# Patient Record
Sex: MALE | Race: WHITE | NOT HISPANIC OR LATINO | Employment: FULL TIME | URBAN - METROPOLITAN AREA
[De-identification: names, ages, dates, MRNs, and addresses within clinical notes are randomized per-mention and may not be internally consistent; named-entity substitution may affect disease eponyms.]

---

## 2017-07-06 ENCOUNTER — NURSE TRIAGE (OUTPATIENT)
Dept: ADMINISTRATIVE | Facility: CLINIC | Age: 42
End: 2017-07-06

## 2017-07-06 NOTE — TELEPHONE ENCOUNTER
Reason for Disposition   MODERATE difficulty breathing (e.g., speaks in phrases, SOB even at rest, pulse 100-120) of new onset or worse than normal    Protocols used: ST BREATHING DIFFICULTY-A-[place]

## 2017-09-11 ENCOUNTER — OFFICE VISIT (OUTPATIENT)
Dept: FAMILY MEDICINE | Facility: CLINIC | Age: 42
End: 2017-09-11
Payer: OTHER GOVERNMENT

## 2017-09-11 ENCOUNTER — LAB VISIT (OUTPATIENT)
Dept: LAB | Facility: HOSPITAL | Age: 42
End: 2017-09-11
Attending: PSYCHIATRY & NEUROLOGY
Payer: OTHER GOVERNMENT

## 2017-09-11 VITALS
BODY MASS INDEX: 29.26 KG/M2 | TEMPERATURE: 98 F | DIASTOLIC BLOOD PRESSURE: 64 MMHG | WEIGHT: 209 LBS | SYSTOLIC BLOOD PRESSURE: 104 MMHG | HEART RATE: 73 BPM | OXYGEN SATURATION: 97 % | RESPIRATION RATE: 18 BRPM | HEIGHT: 71 IN

## 2017-09-11 DIAGNOSIS — R07.89 CHEST TIGHTNESS: ICD-10-CM

## 2017-09-11 DIAGNOSIS — G47.00 INSOMNIA, UNSPECIFIED TYPE: ICD-10-CM

## 2017-09-11 DIAGNOSIS — F41.9 ANXIETY: Primary | ICD-10-CM

## 2017-09-11 LAB
BASOPHILS # BLD AUTO: 0.03 K/UL
BASOPHILS NFR BLD: 0.6 %
DIFFERENTIAL METHOD: NORMAL
EOSINOPHIL # BLD AUTO: 0.1 K/UL
EOSINOPHIL NFR BLD: 2.4 %
ERYTHROCYTE [DISTWIDTH] IN BLOOD BY AUTOMATED COUNT: 11.8 %
HCT VFR BLD AUTO: 43.4 %
HGB BLD-MCNC: 15 G/DL
LYMPHOCYTES # BLD AUTO: 1.3 K/UL
LYMPHOCYTES NFR BLD: 26.7 %
MCH RBC QN AUTO: 29.8 PG
MCHC RBC AUTO-ENTMCNC: 34.6 G/DL
MCV RBC AUTO: 86 FL
MONOCYTES # BLD AUTO: 0.3 K/UL
MONOCYTES NFR BLD: 6.4 %
NEUTROPHILS # BLD AUTO: 3.2 K/UL
NEUTROPHILS NFR BLD: 63.5 %
PLATELET # BLD AUTO: 194 K/UL
PMV BLD AUTO: 10.5 FL
RBC # BLD AUTO: 5.03 M/UL
WBC # BLD AUTO: 5.02 K/UL

## 2017-09-11 PROCEDURE — 36415 COLL VENOUS BLD VENIPUNCTURE: CPT | Mod: PO

## 2017-09-11 PROCEDURE — 99213 OFFICE O/P EST LOW 20 MIN: CPT | Mod: PBBFAC,PO | Performed by: NURSE PRACTITIONER

## 2017-09-11 PROCEDURE — 84443 ASSAY THYROID STIM HORMONE: CPT

## 2017-09-11 PROCEDURE — 93010 ELECTROCARDIOGRAM REPORT: CPT | Mod: ,,, | Performed by: INTERNAL MEDICINE

## 2017-09-11 PROCEDURE — 80053 COMPREHEN METABOLIC PANEL: CPT

## 2017-09-11 PROCEDURE — 99999 PR PBB SHADOW E&M-EST. PATIENT-LVL III: CPT | Mod: PBBFAC,,, | Performed by: NURSE PRACTITIONER

## 2017-09-11 PROCEDURE — 85025 COMPLETE CBC W/AUTO DIFF WBC: CPT

## 2017-09-11 PROCEDURE — 3008F BODY MASS INDEX DOCD: CPT | Mod: ,,, | Performed by: NURSE PRACTITIONER

## 2017-09-11 PROCEDURE — 93005 ELECTROCARDIOGRAM TRACING: CPT | Mod: PBBFAC,PO | Performed by: NURSE PRACTITIONER

## 2017-09-11 PROCEDURE — 80061 LIPID PANEL: CPT

## 2017-09-11 PROCEDURE — 99214 OFFICE O/P EST MOD 30 MIN: CPT | Mod: S$PBB,,, | Performed by: NURSE PRACTITIONER

## 2017-09-11 RX ORDER — HYDROXYZINE HYDROCHLORIDE 25 MG/1
25 TABLET, FILM COATED ORAL
COMMUNITY
End: 2017-10-05

## 2017-09-11 NOTE — PROGRESS NOTES
Subjective:       Patient ID: Hi Salmeron is a 42 y.o. male.    Chief Complaint: Chest Pain and Cough    HPI   Mr. Salmeron is a new patient to me. He reports 2 months ago he started having chest tightness/pains, was advised to go to ED but went to urgent care, workup unremarkable. He can relate time of onset to time of increased stress--young son with heart problems requiring a 2nd surgery, new stressful job, possible deployment at the end of this year. He is anxious while talking to me.  Vitals:    09/11/17 1550   BP: 104/64   Pulse: 73   Resp: 18   Temp: 98.3 °F (36.8 °C)     Review of Systems   Constitutional: Negative.  Negative for chills, diaphoresis, fatigue and fever.   HENT: Negative.  Negative for facial swelling and trouble swallowing.    Eyes: Negative.  Negative for discharge and redness.   Respiratory: Negative.  Negative for cough and shortness of breath.    Cardiovascular: Positive for chest pain. Negative for palpitations.   Gastrointestinal: Negative.  Negative for abdominal pain, constipation and diarrhea.   Genitourinary: Negative.  Negative for difficulty urinating and flank pain.   Musculoskeletal: Negative.  Negative for back pain and neck pain.   Skin: Negative.  Negative for rash and wound.   Neurological: Negative.  Negative for dizziness and light-headedness.   Hematological: Negative.    Psychiatric/Behavioral: Positive for sleep disturbance. Negative for confusion. The patient is nervous/anxious.        No past medical history on file.  Objective:      Physical Exam   Constitutional: He is oriented to person, place, and time. He appears well-developed and well-nourished.  Non-toxic appearance. He does not have a sickly appearance. He does not appear ill. No distress.   HENT:   Head: Normocephalic and atraumatic.   Right Ear: Hearing and external ear normal.   Left Ear: Hearing and external ear normal.   Nose: Nose normal.   Eyes: Conjunctivae, EOM and lids are normal. Right eye exhibits no  discharge. Left eye exhibits no discharge.   Neck: Trachea normal. No JVD present.   Cardiovascular: Normal rate, regular rhythm, S1 normal, S2 normal and normal heart sounds.   No extrasystoles are present. Exam reveals no gallop, no S3, no S4, no distant heart sounds and no friction rub.    No murmur heard.  Pulmonary/Chest: Effort normal and breath sounds normal. No accessory muscle usage. No respiratory distress. He has no decreased breath sounds. He has no wheezes. He has no rhonchi. He has no rales. He exhibits no tenderness.   Abdominal: Soft. Normal appearance and bowel sounds are normal.   Musculoskeletal: Normal range of motion. He exhibits no edema or deformity.   Neurological: He is alert and oriented to person, place, and time.   Skin: Skin is warm, dry and intact. He is not diaphoretic. No pallor.   Psychiatric: His speech is normal and behavior is normal. Judgment and thought content normal. His mood appears anxious. Cognition and memory are normal.   Nursing note and vitals reviewed.      Assessment:       1. Chest tightness    2. Anxiety    3. Insomnia, unspecified type        Plan:       Chest tightness  -     EKG 12-lead; Future---in office NSR  -     Cardiac treadmill stress test; Future  -     CBC auto differential; Future; Expected date: 09/11/2017  -     Comprehensive metabolic panel; Future; Expected date: 09/11/2017  -     Lipid panel; Future; Expected date: 09/11/2017  -     TSH; Future; Expected date: 09/11/2017    Anxiety  Insomnia, unspecified type   Discussed chest tightness likely related to anxiety. Discussed anxiety, role of counseling and medications. Patient would like to wait until workup is completed prior initiation of medication.         Labs today, will call to discuss results  Return if symptoms worsen or fail to improve.

## 2017-09-12 ENCOUNTER — TELEPHONE (OUTPATIENT)
Dept: FAMILY MEDICINE | Facility: CLINIC | Age: 42
End: 2017-09-12

## 2017-09-12 LAB
ALBUMIN SERPL BCP-MCNC: 4.3 G/DL
ALP SERPL-CCNC: 71 U/L
ALT SERPL W/O P-5'-P-CCNC: 31 U/L
ANION GAP SERPL CALC-SCNC: 12 MMOL/L
AST SERPL-CCNC: 26 U/L
BILIRUB SERPL-MCNC: 0.9 MG/DL
BUN SERPL-MCNC: 19 MG/DL
CALCIUM SERPL-MCNC: 9.7 MG/DL
CHLORIDE SERPL-SCNC: 106 MMOL/L
CHOLEST SERPL-MCNC: 202 MG/DL
CHOLEST/HDLC SERPL: 3.5 {RATIO}
CO2 SERPL-SCNC: 24 MMOL/L
CREAT SERPL-MCNC: 1 MG/DL
EST. GFR  (AFRICAN AMERICAN): >60 ML/MIN/1.73 M^2
EST. GFR  (NON AFRICAN AMERICAN): >60 ML/MIN/1.73 M^2
GLUCOSE SERPL-MCNC: 81 MG/DL
HDLC SERPL-MCNC: 57 MG/DL
HDLC SERPL: 28.2 %
LDLC SERPL CALC-MCNC: 132.8 MG/DL
NONHDLC SERPL-MCNC: 145 MG/DL
POTASSIUM SERPL-SCNC: 4.8 MMOL/L
PROT SERPL-MCNC: 7.4 G/DL
SODIUM SERPL-SCNC: 142 MMOL/L
TRIGL SERPL-MCNC: 61 MG/DL
TSH SERPL DL<=0.005 MIU/L-ACNC: 2.06 UIU/ML

## 2017-09-12 NOTE — TELEPHONE ENCOUNTER
Spoke with patient, updated on labs. Advised low fat/low cholesterol diet with regular exercise. Will call with stress test results once completed

## 2017-09-14 ENCOUNTER — CLINICAL SUPPORT (OUTPATIENT)
Dept: CARDIOLOGY | Facility: CLINIC | Age: 42
End: 2017-09-14
Payer: OTHER GOVERNMENT

## 2017-09-14 DIAGNOSIS — R07.89 CHEST TIGHTNESS: ICD-10-CM

## 2017-09-14 LAB — DIASTOLIC DYSFUNCTION: NO

## 2017-09-14 PROCEDURE — 93018 CV STRESS TEST I&R ONLY: CPT | Mod: S$PBB,,, | Performed by: INTERNAL MEDICINE

## 2017-09-14 PROCEDURE — 93016 CV STRESS TEST SUPVJ ONLY: CPT | Mod: S$PBB,,, | Performed by: INTERNAL MEDICINE

## 2017-09-14 PROCEDURE — 93017 CV STRESS TEST TRACING ONLY: CPT | Mod: PBBFAC,PO | Performed by: INTERNAL MEDICINE

## 2017-09-15 ENCOUNTER — TELEPHONE (OUTPATIENT)
Dept: FAMILY MEDICINE | Facility: CLINIC | Age: 42
End: 2017-09-15

## 2017-09-15 DIAGNOSIS — F41.1 GENERALIZED ANXIETY DISORDER: ICD-10-CM

## 2017-09-15 DIAGNOSIS — F41.1 GENERALIZED ANXIETY DISORDER: Primary | ICD-10-CM

## 2017-09-15 RX ORDER — ESCITALOPRAM OXALATE 10 MG/1
TABLET ORAL
Qty: 30 TABLET | Refills: 0 | Status: SHIPPED | OUTPATIENT
Start: 2017-09-15 | End: 2017-10-05 | Stop reason: SDUPTHER

## 2017-09-15 RX ORDER — ESCITALOPRAM OXALATE 10 MG/1
10 TABLET ORAL DAILY
Qty: 30 TABLET | Refills: 0 | Status: SHIPPED | OUTPATIENT
Start: 2017-09-15 | End: 2017-09-15 | Stop reason: SDUPTHER

## 2017-09-15 NOTE — TELEPHONE ENCOUNTER
Spoke with patient, discussed medications, start lexapro--advised to start counseling.     Please call and schedule him for 2-3 week follow up with me--40min appointment

## 2017-09-15 NOTE — TELEPHONE ENCOUNTER
Spoke with patient, updated on stress test. Advised patient I believe his symptoms are related to anxiety, advised medication and counseling. Patient will think about it and call me back to discuss.

## 2017-10-05 ENCOUNTER — OFFICE VISIT (OUTPATIENT)
Dept: FAMILY MEDICINE | Facility: CLINIC | Age: 42
End: 2017-10-05
Payer: OTHER GOVERNMENT

## 2017-10-05 ENCOUNTER — TELEPHONE (OUTPATIENT)
Dept: FAMILY MEDICINE | Facility: CLINIC | Age: 42
End: 2017-10-05

## 2017-10-05 ENCOUNTER — LAB VISIT (OUTPATIENT)
Dept: LAB | Facility: HOSPITAL | Age: 42
End: 2017-10-05
Attending: NURSE PRACTITIONER
Payer: OTHER GOVERNMENT

## 2017-10-05 VITALS
OXYGEN SATURATION: 97 % | BODY MASS INDEX: 28.05 KG/M2 | HEIGHT: 71 IN | RESPIRATION RATE: 16 BRPM | HEART RATE: 96 BPM | SYSTOLIC BLOOD PRESSURE: 102 MMHG | WEIGHT: 200.38 LBS | TEMPERATURE: 98 F | DIASTOLIC BLOOD PRESSURE: 60 MMHG

## 2017-10-05 DIAGNOSIS — F41.1 GENERALIZED ANXIETY DISORDER: ICD-10-CM

## 2017-10-05 PROBLEM — F41.9 ANXIETY: Status: ACTIVE | Noted: 2017-10-05

## 2017-10-05 LAB
ALBUMIN SERPL BCP-MCNC: 4.2 G/DL
ALP SERPL-CCNC: 72 U/L
ALT SERPL W/O P-5'-P-CCNC: 29 U/L
ANION GAP SERPL CALC-SCNC: 7 MMOL/L
AST SERPL-CCNC: 20 U/L
BILIRUB SERPL-MCNC: 0.8 MG/DL
BUN SERPL-MCNC: 24 MG/DL
CALCIUM SERPL-MCNC: 10 MG/DL
CHLORIDE SERPL-SCNC: 107 MMOL/L
CO2 SERPL-SCNC: 29 MMOL/L
CREAT SERPL-MCNC: 1.1 MG/DL
EST. GFR  (AFRICAN AMERICAN): >60 ML/MIN/1.73 M^2
EST. GFR  (NON AFRICAN AMERICAN): >60 ML/MIN/1.73 M^2
GLUCOSE SERPL-MCNC: 68 MG/DL
POTASSIUM SERPL-SCNC: 5 MMOL/L
PROT SERPL-MCNC: 7.3 G/DL
SODIUM SERPL-SCNC: 143 MMOL/L

## 2017-10-05 PROCEDURE — 36415 COLL VENOUS BLD VENIPUNCTURE: CPT | Mod: PO

## 2017-10-05 PROCEDURE — 80053 COMPREHEN METABOLIC PANEL: CPT

## 2017-10-05 PROCEDURE — 99999 PR PBB SHADOW E&M-EST. PATIENT-LVL III: CPT | Mod: PBBFAC,,, | Performed by: NURSE PRACTITIONER

## 2017-10-05 PROCEDURE — 99213 OFFICE O/P EST LOW 20 MIN: CPT | Mod: PBBFAC,PO | Performed by: NURSE PRACTITIONER

## 2017-10-05 PROCEDURE — 99213 OFFICE O/P EST LOW 20 MIN: CPT | Mod: S$PBB,,, | Performed by: NURSE PRACTITIONER

## 2017-10-05 RX ORDER — ESCITALOPRAM OXALATE 10 MG/1
10 TABLET ORAL DAILY
Qty: 90 TABLET | Refills: 1 | Status: SHIPPED | OUTPATIENT
Start: 2017-10-05 | End: 2018-01-30 | Stop reason: SDUPTHER

## 2017-10-05 NOTE — PROGRESS NOTES
Subjective:       Patient ID: Hi Salmeron is a 42 y.o. male.    Chief Complaint: Anxiety    HPI   Mr. Salmeron is a known patient to me. He presents today for anxiety follow up. He is currently on lexapro 10mg daily. He reports he is feeling much better. Continues to have a lot of stress about young son's medical conditions but is able to cope better and not become overwhelmed.   Vitals:    10/05/17 0930   BP: 102/60   Pulse: 96   Resp: 16   Temp: 98.2 °F (36.8 °C)     Review of Systems   Constitutional: Negative.  Negative for diaphoresis and fever.   HENT: Negative.  Negative for facial swelling and trouble swallowing.    Eyes: Negative.  Negative for discharge and redness.   Respiratory: Negative.  Negative for cough and shortness of breath.    Cardiovascular: Negative.  Negative for chest pain and palpitations.   Gastrointestinal: Negative.  Negative for constipation and vomiting.   Skin: Negative.  Negative for rash and wound.   Neurological: Negative.  Negative for tremors and speech difficulty.   Hematological: Negative.    Psychiatric/Behavioral: Negative.  Negative for confusion. The patient is not nervous/anxious.        History reviewed. No pertinent past medical history.  Objective:      Physical Exam   Constitutional: He is oriented to person, place, and time. He appears well-developed.  Non-toxic appearance. No distress.   HENT:   Head: Normocephalic and atraumatic.   Right Ear: Hearing normal.   Left Ear: Hearing normal.   Nose: Nose normal.   Eyes: Conjunctivae, EOM and lids are normal. Right eye exhibits no discharge. Left eye exhibits no discharge.   Neck: Trachea normal. No JVD present.   Cardiovascular: Normal rate, regular rhythm, S1 normal, S2 normal and normal heart sounds.   No extrasystoles are present. Exam reveals no gallop, no S3, no S4, no distant heart sounds and no friction rub.    No murmur heard.  Pulmonary/Chest: Effort normal and breath sounds normal. No accessory muscle usage. No  respiratory distress. He has no decreased breath sounds. He has no wheezes. He has no rhonchi. He has no rales. He exhibits no tenderness.   Abdominal: Normal appearance. He exhibits no distension.   Musculoskeletal: Normal range of motion. He exhibits no edema or deformity.   Neurological: He is alert and oriented to person, place, and time.   Skin: Skin is warm, dry and intact. He is not diaphoretic. No pallor.   Psychiatric: He has a normal mood and affect. His speech is normal and behavior is normal. Judgment and thought content normal. Cognition and memory are normal.   Nursing note and vitals reviewed.      Assessment:       1. Generalized anxiety disorder        Plan:       Generalized anxiety disorder  -     escitalopram oxalate (LEXAPRO) 10 MG tablet; Take 1 tablet (10 mg total) by mouth once daily.  Dispense: 90 tablet; Refill: 1  -     Comprehensive metabolic panel; Future; Expected date: 10/05/2017  - Advised patient he may try increasing lexapro to 20mg daily. Advised to monitor for side effects.     Follow up with any questions or concerns        Return if symptoms worsen or fail to improve.

## 2017-10-05 NOTE — TELEPHONE ENCOUNTER
----- Message from Bertrand Gant sent at 10/5/2017  2:03 PM CDT -----  Contact: self  [phone]  Patient called asking for recent prescribed rx to be faxed to [facility] on [address]. Rx was faxed to the wrong pharmacy. Thanks!

## 2017-10-06 ENCOUNTER — PATIENT MESSAGE (OUTPATIENT)
Dept: FAMILY MEDICINE | Facility: CLINIC | Age: 42
End: 2017-10-06

## 2018-01-30 DIAGNOSIS — F41.1 GENERALIZED ANXIETY DISORDER: ICD-10-CM

## 2018-01-30 RX ORDER — ESCITALOPRAM OXALATE 10 MG/1
TABLET ORAL
Qty: 30 TABLET | Refills: 2 | Status: SHIPPED | OUTPATIENT
Start: 2018-01-30 | End: 2018-02-20 | Stop reason: SDUPTHER

## 2018-01-30 NOTE — TELEPHONE ENCOUNTER
Please call, he needs to be seen every 6 months while on lexapro (which would be in April) schedule to establish with [facility] prior

## 2018-02-01 ENCOUNTER — LAB VISIT (OUTPATIENT)
Dept: LAB | Facility: HOSPITAL | Age: 43
End: 2018-02-01
Attending: NURSE PRACTITIONER
Payer: OTHER GOVERNMENT

## 2018-02-01 DIAGNOSIS — F41.1 GENERALIZED ANXIETY DISORDER: ICD-10-CM

## 2018-02-01 LAB
ALBUMIN SERPL BCP-MCNC: 4 G/DL
ALP SERPL-CCNC: 80 U/L
ALT SERPL W/O P-5'-P-CCNC: 32 U/L
ANION GAP SERPL CALC-SCNC: 10 MMOL/L
AST SERPL-CCNC: 20 U/L
BILIRUB SERPL-MCNC: 0.7 MG/DL
BUN SERPL-MCNC: 24 MG/DL
CALCIUM SERPL-MCNC: 9.2 MG/DL
CHLORIDE SERPL-SCNC: 104 MMOL/L
CO2 SERPL-SCNC: 27 MMOL/L
CREAT SERPL-MCNC: 0.9 MG/DL
EST. GFR  (AFRICAN AMERICAN): >60 ML/MIN/1.73 M^2
EST. GFR  (NON AFRICAN AMERICAN): >60 ML/MIN/1.73 M^2
GLUCOSE SERPL-MCNC: 92 MG/DL
POTASSIUM SERPL-SCNC: 4.1 MMOL/L
PROT SERPL-MCNC: 7.3 G/DL
SODIUM SERPL-SCNC: 141 MMOL/L

## 2018-02-01 PROCEDURE — 36415 COLL VENOUS BLD VENIPUNCTURE: CPT | Mod: PO

## 2018-02-01 PROCEDURE — 80053 COMPREHEN METABOLIC PANEL: CPT

## 2018-02-05 ENCOUNTER — TELEPHONE (OUTPATIENT)
Dept: FAMILY MEDICINE | Facility: CLINIC | Age: 43
End: 2018-02-05

## 2018-02-06 ENCOUNTER — OFFICE VISIT (OUTPATIENT)
Dept: FAMILY MEDICINE | Facility: CLINIC | Age: 43
End: 2018-02-06
Payer: OTHER GOVERNMENT

## 2018-02-06 VITALS
SYSTOLIC BLOOD PRESSURE: 108 MMHG | WEIGHT: 203.69 LBS | RESPIRATION RATE: 18 BRPM | HEART RATE: 78 BPM | OXYGEN SATURATION: 96 % | DIASTOLIC BLOOD PRESSURE: 76 MMHG | HEIGHT: 71 IN | BODY MASS INDEX: 28.52 KG/M2

## 2018-02-06 DIAGNOSIS — Z00.00 ROUTINE PHYSICAL EXAMINATION: Primary | ICD-10-CM

## 2018-02-06 PROCEDURE — 99213 OFFICE O/P EST LOW 20 MIN: CPT | Mod: PBBFAC,PO | Performed by: INTERNAL MEDICINE

## 2018-02-06 PROCEDURE — 99396 PREV VISIT EST AGE 40-64: CPT | Mod: S$PBB,,, | Performed by: INTERNAL MEDICINE

## 2018-02-06 PROCEDURE — 99999 PR PBB SHADOW E&M-EST. PATIENT-LVL III: CPT | Mod: PBBFAC,,, | Performed by: INTERNAL MEDICINE

## 2018-02-06 RX ORDER — MULTIVITAMIN
1 TABLET ORAL DAILY
COMMUNITY

## 2018-02-06 NOTE — PROGRESS NOTES
Subjective:       Patient ID: Hi Salmeron is a 42 y.o. male.    Chief Complaint: Annual Exam    Here for routine health maintenance.  No new complaints.  Situation anxiety - on Lexapro for 3 mo.  Would like to wean off.  Took bc 1yo had heart surgery and ok now.       Review of Systems   Constitutional: Negative for appetite change and fever.   HENT: Negative for nosebleeds and trouble swallowing.    Eyes: Negative for discharge and visual disturbance.   Respiratory: Negative for choking and shortness of breath.    Cardiovascular: Negative for chest pain and palpitations.   Gastrointestinal: Negative for abdominal pain, nausea and vomiting.   Musculoskeletal: Negative for arthralgias and joint swelling.   Skin: Negative for rash and wound.   Neurological: Negative for dizziness and syncope.   Psychiatric/Behavioral: Negative for confusion and dysphoric mood.       Objective:      Vitals:    02/06/18 0750   BP: 108/76   Pulse: 78   Resp: 18     Physical Exam   Constitutional: He appears well-nourished.   Eyes: Conjunctivae and EOM are normal.   Neck: Trachea normal and normal range of motion. No thyromegaly present.   Cardiovascular: Normal heart sounds.    Edema negative   Pulmonary/Chest: Effort normal and breath sounds normal.   Abdominal: Soft. There is no hepatomegaly.   Musculoskeletal:   ROM normal bilateral  Strength normal bilateral   Neurological: He has normal reflexes. No cranial nerve deficit.   Skin: Skin is warm, dry and intact.   Psychiatric: He has a normal mood and affect.   Alert and Oriented    Vitals reviewed.        Assessment:       1. Routine physical examination        Plan:       Routine physical examination    ok to wean - gave instructions.   Wellness reviewed        Counseled on regular exercise, maintenance of a healthy weight, balanced diet rich in fruits/vegetables and lean protein, and avoidance of unhealthy habits like smoking and excessive alcohol intake.   Also, counseled on  "importance of being compliant with medication, health appointments, diet and exercise.     Follow-up if symptoms worsen or fail to improve.    "This note will not be shared with the patient."  "

## 2018-02-20 DIAGNOSIS — F41.1 GENERALIZED ANXIETY DISORDER: ICD-10-CM

## 2018-02-22 RX ORDER — ESCITALOPRAM OXALATE 10 MG/1
10 TABLET ORAL DAILY
Qty: 30 TABLET | Refills: 2 | Status: SHIPPED | OUTPATIENT
Start: 2018-02-22 | End: 2018-09-04

## 2018-09-04 ENCOUNTER — OFFICE VISIT (OUTPATIENT)
Dept: FAMILY MEDICINE | Facility: CLINIC | Age: 43
End: 2018-09-04
Payer: OTHER GOVERNMENT

## 2018-09-04 ENCOUNTER — PATIENT MESSAGE (OUTPATIENT)
Dept: FAMILY MEDICINE | Facility: CLINIC | Age: 43
End: 2018-09-04

## 2018-09-04 ENCOUNTER — HOSPITAL ENCOUNTER (OUTPATIENT)
Dept: RADIOLOGY | Facility: HOSPITAL | Age: 43
Discharge: HOME | End: 2018-09-04
Attending: NURSE PRACTITIONER
Payer: OTHER GOVERNMENT

## 2018-09-04 VITALS
DIASTOLIC BLOOD PRESSURE: 74 MMHG | WEIGHT: 203.69 LBS | BODY MASS INDEX: 28.52 KG/M2 | HEIGHT: 71 IN | TEMPERATURE: 98 F | HEART RATE: 73 BPM | SYSTOLIC BLOOD PRESSURE: 110 MMHG

## 2018-09-04 DIAGNOSIS — M25.572 ACUTE LEFT ANKLE PAIN: ICD-10-CM

## 2018-09-04 DIAGNOSIS — M25.572 ACUTE LEFT ANKLE PAIN: Primary | ICD-10-CM

## 2018-09-04 PROCEDURE — 73610 X-RAY EXAM OF ANKLE: CPT | Mod: TC,FY,PO,LT

## 2018-09-04 PROCEDURE — 99213 OFFICE O/P EST LOW 20 MIN: CPT | Mod: S$PBB,,, | Performed by: NURSE PRACTITIONER

## 2018-09-04 PROCEDURE — 99213 OFFICE O/P EST LOW 20 MIN: CPT | Mod: PBBFAC,PO | Performed by: NURSE PRACTITIONER

## 2018-09-04 PROCEDURE — 73610 X-RAY EXAM OF ANKLE: CPT | Mod: 26,LT,, | Performed by: RADIOLOGY

## 2018-09-04 PROCEDURE — 99999 PR PBB SHADOW E&M-EST. PATIENT-LVL III: CPT | Mod: PBBFAC,,, | Performed by: NURSE PRACTITIONER

## 2018-09-04 NOTE — PROGRESS NOTES
Please call the patient and let him know there is a bone spur to the part of the foot where the achilles inserts that causing his pain. He can do heel lifts and splinting for the pain. He can also talk to his PT about exercises to do at home to help. Thanks.

## 2018-09-04 NOTE — PATIENT INSTRUCTIONS
Understanding Achilles Tendonitis    Achilles tendonitis is an overuse injury. It results in inflammation of the Achilles tendon. This tendon is found on the back of the ankle. It links the calf muscle to the heel bone. It helps you do pushing-off movements like running or standing on your toes.     How to say it  [id number]   What causes Achilles tendonitis?  Achilles tendonitis can happen if you do an activity like running, walking, or jumping too much. This overuse can strain, or pull, the tendon. It may lead to minor tearing of the tendon. An injury to the lower leg or foot can also cause it.  If you dont warm up before taking part in sports such as basketball, you are more likely to suffer from this condition. You are also more prone to it if you do too much of such an activity too quickly. Proper training and rest can help prevent it.  Symptoms of Achilles tendonitis  The main symptom of Achilles tendonitis is pain. This pain mostly happens when you move the ankle. The tendon may also feel stiff after a period of no activity, such as sleeping. It may also become swollen. You may hear a crackling sound when you move your ankle.  Treatment for Achilles tendonitis  Symptoms often get better after starting treatment. A full recovery may take several months. Treatments include:  · Rest. You should stop or change the activity that caused the injury. The tendon will then have time to heal.  · Cold or heat pack. These help reduce pain and swelling.  · Prescription or over-the-counter pain medicines. These help reduce pain and swelling.  · Shoe inserts. These devices can reduce strain on the Achilles tendon when you move. You may then feel less pain.  · Stretching and strengthening exercises. Certain exercises can help you regain flexibility and strength in your Achilles tendon.  · Surgery. This option can fix the injured tendon. But you dont often need it unless other treatments dont work.     When  to call your healthcare provider   Call your healthcare provider right away if you have any of these:  · Fever of 100.4°F (38°C) or higher, or as directed  · Pain that gets worse  · Symptoms that dont get better, or get worse  · New symptoms    Date Last Reviewed: 3/10/2016  © 1354-3309 [employer]. [address] [place] [address], [place] [address]. All rights reserved. This information is not intended as a substitute for professional medical care. Always follow your healthcare professional's instructions.

## 2018-09-04 NOTE — PROGRESS NOTES
"Subjective:       Patient ID: Hi Salmeron is a 43 y.o. male.    Chief Complaint: Ankle Pain (for 2 weeks- "feels hot" )    Ankle Pain    The incident occurred more than 1 week ago (2 weeks ago). The incident occurred at home. There was no injury mechanism. The pain is present in the left heel. The quality of the pain is described as burning. Pertinent negatives include no inability to bear weight, loss of motion, loss of sensation, muscle weakness, numbness or tingling. He reports no foreign bodies present. The symptoms are aggravated by movement, palpation and weight bearing. He has tried nothing for the symptoms.     Review of Systems   Neurological: Negative for tingling and numbness.       Objective:      Physical Exam   Constitutional: He is oriented to person, place, and time. He appears well-developed and well-nourished.   HENT:   Head: Normocephalic and atraumatic.   Right Ear: External ear normal.   Left Ear: External ear normal.   Eyes: Conjunctivae and EOM are normal. Pupils are equal, round, and reactive to light.   Neck: Normal range of motion. Neck supple.   Cardiovascular: Normal rate and regular rhythm.   Pulmonary/Chest: Effort normal and breath sounds normal.   Abdominal: Soft. Bowel sounds are normal.   Musculoskeletal:        Left foot: There is decreased range of motion and tenderness. There is no bony tenderness, no swelling and normal capillary refill.        Feet:    Pain with plantar flexion   Neurological: He is alert and oriented to person, place, and time.   Skin: Skin is warm and dry.   Nursing note and vitals reviewed.      Assessment:       1. Acute left ankle pain        Plan:       Acute left ankle pain  -     X-Ray Ankle Complete Left; Future; Expected date: 09/04/2018      Discussed with patient RICE therapy. Will follow up on xray. Patient to follow up in 1-2 weeks if no better.   "

## 2018-09-05 ENCOUNTER — PATIENT MESSAGE (OUTPATIENT)
Dept: FAMILY MEDICINE | Facility: CLINIC | Age: 43
End: 2018-09-05

## 2018-09-05 NOTE — PROGRESS NOTES
Please let the patient know that the pain is probably not from the bone spur itself but inflammation on the achilles. If he is having pain he should not run. He should use ibuprofen, heel stretches, and a splint. If the pain does not get better we can have him follow up with PT or ortho. Thanks.

## 2019-02-05 ENCOUNTER — LAB VISIT (OUTPATIENT)
Dept: LAB | Facility: HOSPITAL | Age: 44
End: 2019-02-05
Attending: INTERNAL MEDICINE
Payer: OTHER GOVERNMENT

## 2019-02-05 DIAGNOSIS — G90.8 INTESTINAL AUTONOMIC NEUROPATHY: Primary | ICD-10-CM

## 2019-02-05 LAB
25(OH)D3+25(OH)D2 SERPL-MCNC: 24 NG/ML
ALBUMIN SERPL BCP-MCNC: 4.3 G/DL
ALP SERPL-CCNC: 59 U/L
ALT SERPL W/O P-5'-P-CCNC: 28 U/L
ANION GAP SERPL CALC-SCNC: 6 MMOL/L
AST SERPL-CCNC: 22 U/L
BASOPHILS # BLD AUTO: 0.03 K/UL
BASOPHILS NFR BLD: 0.8 %
BILIRUB SERPL-MCNC: 0.8 MG/DL
BUN SERPL-MCNC: 17 MG/DL
CALCIUM SERPL-MCNC: 9.5 MG/DL
CHLORIDE SERPL-SCNC: 105 MMOL/L
CO2 SERPL-SCNC: 29 MMOL/L
CREAT SERPL-MCNC: 0.9 MG/DL
CRP SERPL-MCNC: 1.1 MG/L
DIFFERENTIAL METHOD: ABNORMAL
EOSINOPHIL # BLD AUTO: 0.1 K/UL
EOSINOPHIL NFR BLD: 3.2 %
ERYTHROCYTE [DISTWIDTH] IN BLOOD BY AUTOMATED COUNT: 11.4 %
ERYTHROCYTE [SEDIMENTATION RATE] IN BLOOD BY WESTERGREN METHOD: 1 MM/HR
EST. GFR  (AFRICAN AMERICAN): >60 ML/MIN/1.73 M^2
EST. GFR  (NON AFRICAN AMERICAN): >60 ML/MIN/1.73 M^2
ESTIMATED AVG GLUCOSE: 82 MG/DL
GLUCOSE SERPL-MCNC: 91 MG/DL
HBA1C MFR BLD HPLC: 4.5 %
HCT VFR BLD AUTO: 47 %
HGB BLD-MCNC: 15.4 G/DL
IMM GRANULOCYTES # BLD AUTO: 0.01 K/UL
IMM GRANULOCYTES NFR BLD AUTO: 0.3 %
LYMPHOCYTES # BLD AUTO: 1 K/UL
LYMPHOCYTES NFR BLD: 26.9 %
MCH RBC QN AUTO: 30 PG
MCHC RBC AUTO-ENTMCNC: 32.8 G/DL
MCV RBC AUTO: 91 FL
MONOCYTES # BLD AUTO: 0.3 K/UL
MONOCYTES NFR BLD: 7.8 %
NEUTROPHILS # BLD AUTO: 2.3 K/UL
NEUTROPHILS NFR BLD: 61 %
NRBC BLD-RTO: 0 /100 WBC
PLATELET # BLD AUTO: 183 K/UL
PMV BLD AUTO: 10.4 FL
POTASSIUM SERPL-SCNC: 4.5 MMOL/L
PROT SERPL-MCNC: 7.2 G/DL
RBC # BLD AUTO: 5.14 M/UL
SODIUM SERPL-SCNC: 140 MMOL/L
T4 FREE SERPL-MCNC: 0.98 NG/DL
TSH SERPL DL<=0.005 MIU/L-ACNC: 2.44 UIU/ML
VIT B12 SERPL-MCNC: 724 PG/ML
WBC # BLD AUTO: 3.72 K/UL

## 2019-02-05 PROCEDURE — 85651 RBC SED RATE NONAUTOMATED: CPT | Mod: PO

## 2019-02-05 PROCEDURE — 80053 COMPREHEN METABOLIC PANEL: CPT

## 2019-02-05 PROCEDURE — 84439 ASSAY OF FREE THYROXINE: CPT

## 2019-02-05 PROCEDURE — 83036 HEMOGLOBIN GLYCOSYLATED A1C: CPT

## 2019-02-05 PROCEDURE — 84443 ASSAY THYROID STIM HORMONE: CPT

## 2019-02-05 PROCEDURE — 36415 COLL VENOUS BLD VENIPUNCTURE: CPT | Mod: PO

## 2019-02-05 PROCEDURE — 82607 VITAMIN B-12: CPT

## 2019-02-05 PROCEDURE — 85025 COMPLETE CBC W/AUTO DIFF WBC: CPT

## 2019-02-05 PROCEDURE — 86140 C-REACTIVE PROTEIN: CPT

## 2019-02-05 PROCEDURE — 82306 VITAMIN D 25 HYDROXY: CPT

## 2019-06-10 ENCOUNTER — OFFICE VISIT (OUTPATIENT)
Dept: URGENT CARE | Facility: CLINIC | Age: 44
End: 2019-06-10
Payer: OTHER GOVERNMENT

## 2019-06-10 VITALS
RESPIRATION RATE: 16 BRPM | HEIGHT: 71 IN | BODY MASS INDEX: 28.42 KG/M2 | SYSTOLIC BLOOD PRESSURE: 130 MMHG | HEART RATE: 80 BPM | OXYGEN SATURATION: 99 % | WEIGHT: 203 LBS | TEMPERATURE: 98 F | DIASTOLIC BLOOD PRESSURE: 84 MMHG

## 2019-06-10 DIAGNOSIS — M54.2 CHRONIC NECK PAIN: ICD-10-CM

## 2019-06-10 DIAGNOSIS — M54.2 NECK PAIN: ICD-10-CM

## 2019-06-10 DIAGNOSIS — M62.838 NECK MUSCLE SPASM: Primary | ICD-10-CM

## 2019-06-10 DIAGNOSIS — G89.29 CHRONIC NECK PAIN: ICD-10-CM

## 2019-06-10 PROCEDURE — 96372 THER/PROPH/DIAG INJ SC/IM: CPT | Mod: S$GLB,,, | Performed by: FAMILY MEDICINE

## 2019-06-10 PROCEDURE — 99214 OFFICE O/P EST MOD 30 MIN: CPT | Mod: 25,S$GLB,, | Performed by: PHYSICIAN ASSISTANT

## 2019-06-10 PROCEDURE — 96372 PR INJECTION,THERAP/PROPH/DIAG2ST, IM OR SUBCUT: ICD-10-PCS | Mod: S$GLB,,, | Performed by: FAMILY MEDICINE

## 2019-06-10 PROCEDURE — 99214 PR OFFICE/OUTPT VISIT, EST, LEVL IV, 30-39 MIN: ICD-10-PCS | Mod: 25,S$GLB,, | Performed by: PHYSICIAN ASSISTANT

## 2019-06-10 RX ORDER — KETOROLAC TROMETHAMINE 30 MG/ML
30 INJECTION, SOLUTION INTRAMUSCULAR; INTRAVENOUS
Status: COMPLETED | OUTPATIENT
Start: 2019-06-10 | End: 2019-06-10

## 2019-06-10 RX ORDER — GABAPENTIN 300 MG/1
CAPSULE ORAL
Refills: 1 | COMMUNITY
Start: 2019-05-06 | End: 2020-08-03

## 2019-06-10 RX ORDER — MELOXICAM 15 MG/1
15 TABLET ORAL DAILY
Qty: 30 TABLET | Refills: 1 | Status: SHIPPED | OUTPATIENT
Start: 2019-06-10 | End: 2020-05-22 | Stop reason: SDUPTHER

## 2019-06-10 RX ORDER — BETAMETHASONE SODIUM PHOSPHATE AND BETAMETHASONE ACETATE 3; 3 MG/ML; MG/ML
6 INJECTION, SUSPENSION INTRA-ARTICULAR; INTRALESIONAL; INTRAMUSCULAR; SOFT TISSUE
Status: COMPLETED | OUTPATIENT
Start: 2019-06-10 | End: 2019-06-10

## 2019-06-10 RX ORDER — METHOCARBAMOL 500 MG/1
500 TABLET, FILM COATED ORAL 4 TIMES DAILY
Qty: 40 TABLET | Refills: 0 | Status: SHIPPED | OUTPATIENT
Start: 2019-06-10 | End: 2019-06-20

## 2019-06-10 RX ADMIN — BETAMETHASONE SODIUM PHOSPHATE AND BETAMETHASONE ACETATE 6 MG: 3; 3 INJECTION, SUSPENSION INTRA-ARTICULAR; INTRALESIONAL; INTRAMUSCULAR; SOFT TISSUE at 08:06

## 2019-06-10 RX ADMIN — KETOROLAC TROMETHAMINE 30 MG: 30 INJECTION, SOLUTION INTRAMUSCULAR; INTRAVENOUS at 08:06

## 2019-06-10 NOTE — PROGRESS NOTES
"Subjective:       Patient ID: Hi Salmeron is a 43 y.o. male.    Vitals:  height is 5' 11" (1.803 m) and weight is 92.1 kg (203 lb). His oral temperature is 97.7 °F (36.5 °C). His blood pressure is 130/84 and his pulse is 80. His respiration is 16 and oxygen saturation is 99%.     Chief Complaint: Neck Pain    New problem states neck pain for 3 days, may have pulled a muscle, cannot sleep at night. Pt states took Gabapentin that he had for back pain and states almost out. Also used Bio Freeze no relief    Neck Pain    This is a new problem. The current episode started in the past 7 days. The problem occurs constantly. The problem has been gradually worsening. The pain is associated with nothing. The pain is present in the right side. The quality of the pain is described as aching and shooting. The symptoms are aggravated by position and bending. The pain is same all the time. Stiffness is present all day. Pertinent negatives include no chest pain, fever or headaches. He has tried acetaminophen, bed rest and neck support for the symptoms. The treatment provided no relief.       Constitution: Negative for chills, fatigue and fever.   HENT: Negative for congestion and sore throat.    Neck: Positive for neck pain. Negative for painful lymph nodes.   Cardiovascular: Negative for chest pain and leg swelling.   Eyes: Negative for double vision and blurred vision.   Respiratory: Negative for cough and shortness of breath.    Gastrointestinal: Negative for nausea, vomiting and diarrhea.   Genitourinary: Negative for dysuria, frequency and urgency.   Musculoskeletal: Negative for joint pain, joint swelling, muscle cramps and muscle ache.   Skin: Negative for color change, pale and rash.   Allergic/Immunologic: Negative for seasonal allergies.   Neurological: Negative for dizziness, history of vertigo, light-headedness, passing out and headaches.   Hematologic/Lymphatic: Negative for swollen lymph nodes, easy bruising/bleeding " and history of blood clots. Does not bruise/bleed easily.   Psychiatric/Behavioral: Negative for nervous/anxious, sleep disturbance and depression. The patient is not nervous/anxious.        Objective:      Physical Exam   Constitutional: He is oriented to person, place, and time. Vital signs are normal. He appears well-developed and well-nourished. He is active and cooperative. No distress.   HENT:   Head: Normocephalic and atraumatic.   Nose: Nose normal.   Mouth/Throat: Oropharynx is clear and moist and mucous membranes are normal.   Eyes: Conjunctivae and lids are normal.   Neck: Trachea normal, normal range of motion, full passive range of motion without pain and phonation normal. Neck supple.   Cardiovascular: Normal rate, regular rhythm, normal heart sounds, intact distal pulses and normal pulses.   Pulmonary/Chest: Effort normal and breath sounds normal.   Abdominal: Soft. Normal appearance and bowel sounds are normal. He exhibits no abdominal bruit, no pulsatile midline mass and no mass.   Musculoskeletal: He exhibits no edema or deformity.        Cervical back: He exhibits decreased range of motion, tenderness, pain and spasm. He exhibits no bony tenderness.        Back:    Neurological: He is alert and oriented to person, place, and time. He has normal strength and normal reflexes. No cranial nerve deficit or sensory deficit. He displays a negative Romberg sign. GCS eye subscore is 4. GCS verbal subscore is 5. GCS motor subscore is 6.   Reflex Scores:       Tricep reflexes are 2+ on the right side and 2+ on the left side.       Bicep reflexes are 2+ on the right side and 2+ on the left side.       Brachioradialis reflexes are 2+ on the right side and 2+ on the left side.  UE sensory and motor function full, intact bilaterally.   Skin: Skin is warm, dry and intact. He is not diaphoretic.   Psychiatric: He has a normal mood and affect. His speech is normal and behavior is normal. Judgment and thought  content normal. Cognition and memory are normal.   Nursing note and vitals reviewed.      Assessment:       1. Neck muscle spasm    2. Chronic neck pain    3. Neck pain        Plan:         Neck muscle spasm  -     betamethasone acetate-betamethasone sodium phosphate injection 6 mg  -     ketorolac injection 30 mg  -     meloxicam (MOBIC) 15 MG tablet; Take 1 tablet (15 mg total) by mouth once daily. Take with food  Dispense: 30 tablet; Refill: 1  -     methocarbamol (ROBAXIN) 500 MG Tab; Take 1 tablet (500 mg total) by mouth 4 (four) times daily. for 10 days  Dispense: 40 tablet; Refill: 0    Chronic neck pain  -     betamethasone acetate-betamethasone sodium phosphate injection 6 mg  -     ketorolac injection 30 mg  -     meloxicam (MOBIC) 15 MG tablet; Take 1 tablet (15 mg total) by mouth once daily. Take with food  Dispense: 30 tablet; Refill: 1  -     methocarbamol (ROBAXIN) 500 MG Tab; Take 1 tablet (500 mg total) by mouth 4 (four) times daily. for 10 days  Dispense: 40 tablet; Refill: 0    Neck pain  -     betamethasone acetate-betamethasone sodium phosphate injection 6 mg  -     ketorolac injection 30 mg  -     meloxicam (MOBIC) 15 MG tablet; Take 1 tablet (15 mg total) by mouth once daily. Take with food  Dispense: 30 tablet; Refill: 1  -     methocarbamol (ROBAXIN) 500 MG Tab; Take 1 tablet (500 mg total) by mouth 4 (four) times daily. for 10 days  Dispense: 40 tablet; Refill: 0    patient to follow up with spine today, per scheduled appointment.     Patient Instructions   Start Mobic tomorrow; don't take with other NSAIDS      General Neck and Back Pain    Both neck and back pain are usually caused by injury to the muscles or ligaments of the spine. Sometimes the disks that separate each bone of the spine may cause pain by pressing on a nearby nerve. Back and neck pain may appear after a sudden twisting or bending force (such as in a car accident), or sometimes after a simple awkward movement. In either  case, muscle spasm is often present and adds to the pain.  Acute neck and back pain usually gets better in 1 to 2 weeks. Pain related to disk disease, arthritis in the spinal joints or spinal stenosis (narrowing of the spinal canal) can become chronic and last for months or years.  Back and neck pain are common problems. Most people feel better in 1 or 2 weeks, and most of the rest in 1 to 2 months. Most people can remain active.  People experience and describe pain differently.  · Pain can be sharp, stabbing, shooting, aching, cramping, or burning  · Movement, standing, bending, lifting, sitting, or walking may worsen the pain  · Pain can be localized to one spot or area, or it can be more generalized  · Pain can spread or radiate upwards, downwards, to the front, or go down your arms  · Muscle spasm may occur.  Most of the time mechanical problems with the muscles or spine cause the pain. it is usually caused by an injury, whether known or not, to the muscles or ligaments. While illnesses can cause back pain, it is usually not caused by a serious illness. Pain is usually related to physical activity, whether sports, exercise, work, or normal activity. Sometimes it can occur without an identifiable cause. This can happen simply by stretching or moving wrong, without noting pain at the time. Other causes include:  · Overexertion, lifting, pushing, pulling incorrectly or too aggressively.  · Sudden twisting, bending or stretching from an accident (car or fall), or accidental movement.  · Poor posture  · Poor conditioning, lack of regular exercise  · Spinal disc disease or arthritis  · Stress  · Pregnancy, or illness like appendicitis, bladder or kidney infection, pelvic infections   Home care  · For neck pain: Use a comfortable pillow that supports the head and keeps the spine in a neutral position. The position of the head should not be tilted forward or backward.  · When in bed, try to find a position of comfort. A  firm mattress is best. Try lying flat on your back with pillows under your knees. You can also try lying on your side with your knees bent up towards your chest and a pillow between your knees.  · At first, do not try to stretch out the sore spots. If there is a strain, it is not like the good soreness you get after exercising without an injury. In this case, stretching may make it worse.  · Avoid prolonged sitting, long car rides or travel. This puts more stress on the lower back than standing or walking.  · During the first 24 to 72 hours after an injury, apply an ice pack to the painful area for 20 minutes and then remove it for 20 minutes over a period of 60 to 90 minutes or several times a day.   · You can alternate ice and heat therapies. Talk with your healthcare provider about the best treatment for your back or neck pain. As a safety precaution, do not use a heating pad at bedtime. Sleeping with a heating pad can lead to skin burns or tissue damage.  · Therapeutic massage can help relax the back and neck muscles without stretching them.  · Be aware of safe lifting methods and do not lift anything over 15 pounds until all the pain is gone.  Medications  Talk to your healthcare provider before using medicine, especially if you have other medical problems or are taking other medicines.  · You may use over-the-counter medicine to control pain, unless another pain medicine was prescribed. If you have chronic conditions like diabetes, liver or kidney disease, stomach ulcers,  gastrointestinal bleeding, or are taking blood thinner medicines.  · Be careful if you are given pain medicines, narcotics, or medicine for muscle spasm. They can cause drowsiness, and can affect your coordination, reflexes, and judgment. Do not drive or operate heavy machinery.  Follow-up care  Follow up with your healthcare provider, or as advised. Physical therapy or further tests may be needed.  If X-rays were taken, you will be notified  of any new findings that may affect your care.  Call 911  Seek emergency medical care if any of the following occur:  · Trouble breathing  · Confusion  · Very drowsy or trouble awakening  · Fainting or loss of consciousness  · Rapid or very slow heart rate  · Loss of bowel or bladder control  When to seek medical advice  Call your healthcare provider right away if any of these occur:  · Pain becomes worse or spreads into your arms or legs  · Weakness, numbness or pain in one or both arms or legs  · Numbness in the groin area  · Difficulty walking  · Fever of 100.4ºF (38ºC) or higher, or as directed by your healthcare provider  Date Last Reviewed: 7/1/2016  © 8592-3863 [employer]. [address] [place] [address], [place] [address]. All rights reserved. This information is not intended as a substitute for professional medical care. Always follow your healthcare professional's instructions.       If not allergic,take tylenol (acetominophen) for fever control, chills, or body aches every 4 hours. Do not exceed 4000 mg/ day.If not allergic, take Motrin (Ibuprofen) every 4 hours for fever, chills, pain or inflammation. Do not exceed 2400 mg/day. You can alternate taking tylenol and motrin.  If you were prescribed a narcotic medication, do not drive or operate heavy equipment or machinery while taking these medications.  You must understand that you've received an Urgent Care treatment only and that you may be released before all your medical problems are known or treated. You, the patient, will arrange for follow up care as instructed.  Follow up with your PCP or specialty clinic as directed in the next 1-2 weeks if not improved or as needed.  You can call [phone] to schedule an appointment with the appropriate provider.  If your condition worsens we recommend that you receive another evaluation at the emergency room immediately or contact your primary medical clinics after hours call service to discuss your  concerns.  Please return here or go to the Emergency Department for any concerns or worsening of condition.

## 2019-06-10 NOTE — PATIENT INSTRUCTIONS
Start Mobic tomorrow; don't take with other NSAIDS      General Neck and Back Pain    Both neck and back pain are usually caused by injury to the muscles or ligaments of the spine. Sometimes the disks that separate each bone of the spine may cause pain by pressing on a nearby nerve. Back and neck pain may appear after a sudden twisting or bending force (such as in a car accident), or sometimes after a simple awkward movement. In either case, muscle spasm is often present and adds to the pain.  Acute neck and back pain usually gets better in 1 to 2 weeks. Pain related to disk disease, arthritis in the spinal joints or spinal stenosis (narrowing of the spinal canal) can become chronic and last for months or years.  Back and neck pain are common problems. Most people feel better in 1 or 2 weeks, and most of the rest in 1 to 2 months. Most people can remain active.  People experience and describe pain differently.  · Pain can be sharp, stabbing, shooting, aching, cramping, or burning  · Movement, standing, bending, lifting, sitting, or walking may worsen the pain  · Pain can be localized to one spot or area, or it can be more generalized  · Pain can spread or radiate upwards, downwards, to the front, or go down your arms  · Muscle spasm may occur.  Most of the time mechanical problems with the muscles or spine cause the pain. it is usually caused by an injury, whether known or not, to the muscles or ligaments. While illnesses can cause back pain, it is usually not caused by a serious illness. Pain is usually related to physical activity, whether sports, exercise, work, or normal activity. Sometimes it can occur without an identifiable cause. This can happen simply by stretching or moving wrong, without noting pain at the time. Other causes include:  · Overexertion, lifting, pushing, pulling incorrectly or too aggressively.  · Sudden twisting, bending or stretching from an accident (car or fall), or accidental  movement.  · Poor posture  · Poor conditioning, lack of regular exercise  · Spinal disc disease or arthritis  · Stress  · Pregnancy, or illness like appendicitis, bladder or kidney infection, pelvic infections   Home care  · For neck pain: Use a comfortable pillow that supports the head and keeps the spine in a neutral position. The position of the head should not be tilted forward or backward.  · When in bed, try to find a position of comfort. A firm mattress is best. Try lying flat on your back with pillows under your knees. You can also try lying on your side with your knees bent up towards your chest and a pillow between your knees.  · At first, do not try to stretch out the sore spots. If there is a strain, it is not like the good soreness you get after exercising without an injury. In this case, stretching may make it worse.  · Avoid prolonged sitting, long car rides or travel. This puts more stress on the lower back than standing or walking.  · During the first 24 to 72 hours after an injury, apply an ice pack to the painful area for 20 minutes and then remove it for 20 minutes over a period of 60 to 90 minutes or several times a day.   · You can alternate ice and heat therapies. Talk with your healthcare provider about the best treatment for your back or neck pain. As a safety precaution, do not use a heating pad at bedtime. Sleeping with a heating pad can lead to skin burns or tissue damage.  · Therapeutic massage can help relax the back and neck muscles without stretching them.  · Be aware of safe lifting methods and do not lift anything over 15 pounds until all the pain is gone.  Medications  Talk to your healthcare provider before using medicine, especially if you have other medical problems or are taking other medicines.  · You may use over-the-counter medicine to control pain, unless another pain medicine was prescribed. If you have chronic conditions like diabetes, liver or kidney disease, stomach  ulcers,  gastrointestinal bleeding, or are taking blood thinner medicines.  · Be careful if you are given pain medicines, narcotics, or medicine for muscle spasm. They can cause drowsiness, and can affect your coordination, reflexes, and judgment. Do not drive or operate heavy machinery.  Follow-up care  Follow up with your healthcare provider, or as advised. Physical therapy or further tests may be needed.  If X-rays were taken, you will be notified of any new findings that may affect your care.  Call 911  Seek emergency medical care if any of the following occur:  · Trouble breathing  · Confusion  · Very drowsy or trouble awakening  · Fainting or loss of consciousness  · Rapid or very slow heart rate  · Loss of bowel or bladder control  When to seek medical advice  Call your healthcare provider right away if any of these occur:  · Pain becomes worse or spreads into your arms or legs  · Weakness, numbness or pain in one or both arms or legs  · Numbness in the groin area  · Difficulty walking  · Fever of 100.4ºF (38ºC) or higher, or as directed by your healthcare provider  Date Last Reviewed: 7/1/2016 © 2000-2017 [employer]. [address] [place] [address]. All rights reserved. This information is not intended as a substitute for professional medical care. Always follow your healthcare professional's instructions.       If not allergic,take tylenol (acetominophen) for fever control, chills, or body aches every 4 hours. Do not exceed 4000 mg/ day.If not allergic, take Motrin (Ibuprofen) every 4 hours for fever, chills, pain or inflammation. Do not exceed 2400 mg/day. You can alternate taking tylenol and motrin.  If you were prescribed a narcotic medication, do not drive or operate heavy equipment or machinery while taking these medications.  You must understand that you've received an Urgent Care treatment only and that you may be released before all your medical problems are known or  treated. You, the patient, will arrange for follow up care as instructed.  Follow up with your PCP or specialty clinic as directed in the next 1-2 weeks if not improved or as needed.  You can call [phone] to schedule an appointment with the appropriate provider.  If your condition worsens we recommend that you receive another evaluation at the emergency room immediately or contact your primary medical clinics after hours call service to discuss your concerns.  Please return here or go to the Emergency Department for any concerns or worsening of condition.

## 2019-06-13 ENCOUNTER — TELEPHONE (OUTPATIENT)
Dept: URGENT CARE | Facility: CLINIC | Age: 44
End: 2019-06-13

## 2019-10-21 ENCOUNTER — OFFICE VISIT (OUTPATIENT)
Dept: FAMILY MEDICINE | Facility: CLINIC | Age: 44
End: 2019-10-21
Payer: OTHER GOVERNMENT

## 2019-10-21 ENCOUNTER — HOSPITAL ENCOUNTER (OUTPATIENT)
Dept: RADIOLOGY | Facility: HOSPITAL | Age: 44
Discharge: HOME | End: 2019-10-21
Attending: PHYSICIAN ASSISTANT
Payer: OTHER GOVERNMENT

## 2019-10-21 VITALS
OXYGEN SATURATION: 98 % | DIASTOLIC BLOOD PRESSURE: 70 MMHG | BODY MASS INDEX: 27.72 KG/M2 | HEIGHT: 71 IN | WEIGHT: 198 LBS | HEART RATE: 82 BPM | TEMPERATURE: 98 F | SYSTOLIC BLOOD PRESSURE: 110 MMHG

## 2019-10-21 DIAGNOSIS — M25.561 ACUTE PAIN OF RIGHT KNEE: Primary | ICD-10-CM

## 2019-10-21 DIAGNOSIS — M25.561 ACUTE PAIN OF RIGHT KNEE: ICD-10-CM

## 2019-10-21 PROCEDURE — 73562 XR KNEE 3 VIEW RIGHT: ICD-10-PCS | Mod: 26,RT,, | Performed by: RADIOLOGY

## 2019-10-21 PROCEDURE — 73562 X-RAY EXAM OF KNEE 3: CPT | Mod: 26,RT,, | Performed by: RADIOLOGY

## 2019-10-21 PROCEDURE — 99214 OFFICE O/P EST MOD 30 MIN: CPT | Mod: S$PBB,,, | Performed by: PHYSICIAN ASSISTANT

## 2019-10-21 PROCEDURE — 99214 OFFICE O/P EST MOD 30 MIN: CPT | Mod: PBBFAC,PO | Performed by: PHYSICIAN ASSISTANT

## 2019-10-21 PROCEDURE — 99999 PR PBB SHADOW E&M-EST. PATIENT-LVL IV: CPT | Mod: PBBFAC,,, | Performed by: PHYSICIAN ASSISTANT

## 2019-10-21 PROCEDURE — 99999 PR PBB SHADOW E&M-EST. PATIENT-LVL IV: ICD-10-PCS | Mod: PBBFAC,,, | Performed by: PHYSICIAN ASSISTANT

## 2019-10-21 PROCEDURE — 73562 X-RAY EXAM OF KNEE 3: CPT | Mod: TC,FY,PO,RT

## 2019-10-21 PROCEDURE — 99214 PR OFFICE/OUTPT VISIT, EST, LEVL IV, 30-39 MIN: ICD-10-PCS | Mod: S$PBB,,, | Performed by: PHYSICIAN ASSISTANT

## 2019-10-21 NOTE — PROGRESS NOTES
"Subjective:      Patient ID: Hi Salmeron is a 44 y.o. male.    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 There was no injury mechanism. Pertinent negatives include no numbness or tingling. He has tried rest, ice and NSAIDs for the symptoms. The treatment provided mild relief.     Review of Systems   Constitutional: Negative for activity change and unexpected weight change.   HENT: Negative for hearing loss, rhinorrhea and trouble swallowing.    Eyes: Negative for discharge and visual disturbance.   Respiratory: Negative for chest tightness and wheezing.    Cardiovascular: Negative for chest pain and palpitations.   Gastrointestinal: Negative for blood in stool, constipation, diarrhea and vomiting.   Endocrine: Negative for polydipsia and polyuria.   Genitourinary: Negative for difficulty urinating, hematuria and urgency.   Musculoskeletal: Positive for arthralgias. Negative for joint swelling and neck pain.        Denies bulges/masses   Skin: Negative for color change, rash and wound.   Neurological: Negative for tingling, weakness, numbness and headaches.   Psychiatric/Behavioral: Negative for confusion and dysphoric mood.       Objective:   /70 (BP Location: Left arm, Patient Position: Sitting)   Pulse 82   Temp 98.3 °F (36.8 °C) (Oral)   Ht 5' 11" (1.803 m)   Wt 89.8 kg (197 lb 15.6 oz)   SpO2 98%   " BMI 27.61 kg/m²   Physical Exam   Constitutional: He appears well-developed and well-nourished. He does not appear ill. No distress.   HENT:   Head: Normocephalic and atraumatic.   Cardiovascular: Normal rate, regular rhythm and normal heart sounds.   No murmur heard.  Pulmonary/Chest: Effort normal and breath sounds normal. No respiratory distress. He has no decreased breath sounds. He has no wheezes. He has no rhonchi. He has no rales.   Musculoskeletal:        Right knee: He exhibits normal range of motion, no swelling, no deformity and no erythema. Tenderness found.   Neurological: He has normal strength. No sensory deficit.   Skin: Skin is warm, dry and intact. No rash noted. No erythema.   Psychiatric: He has a normal mood and affect. His speech is normal and behavior is normal. Thought content normal.     Assessment:      1. Acute pain of right knee       Plan:   Acute pain of right knee  -     X-Ray Knee 3 View Right; Future; Expected date: 10/21/2019  -     Ambulatory referral/consult to Orthopedics; Future; Expected date: 10/21/2019    Discussed RICE and NSAIDs, which patient has been doing   Ortho referral and eval as patient is leaving [facility] next week for training again     Discussed worsening signs/symptoms and when to return to clinic or go to ED.   Patient expresses understanding and agrees with treatment plan.

## 2019-10-22 ENCOUNTER — OFFICE VISIT (OUTPATIENT)
Dept: ORTHOPEDICS | Facility: CLINIC | Age: 44
End: 2019-10-22
Payer: OTHER GOVERNMENT

## 2019-10-22 VITALS
HEIGHT: 71 IN | SYSTOLIC BLOOD PRESSURE: 127 MMHG | DIASTOLIC BLOOD PRESSURE: 80 MMHG | WEIGHT: 198 LBS | HEART RATE: 78 BPM | BODY MASS INDEX: 27.72 KG/M2

## 2019-10-22 DIAGNOSIS — M25.562 BILATERAL CHRONIC KNEE PAIN: ICD-10-CM

## 2019-10-22 DIAGNOSIS — M17.0 BILATERAL PRIMARY OSTEOARTHRITIS OF KNEE: Primary | ICD-10-CM

## 2019-10-22 DIAGNOSIS — M25.561 ACUTE PAIN OF RIGHT KNEE: ICD-10-CM

## 2019-10-22 DIAGNOSIS — M25.561 BILATERAL CHRONIC KNEE PAIN: ICD-10-CM

## 2019-10-22 DIAGNOSIS — G89.29 BILATERAL CHRONIC KNEE PAIN: ICD-10-CM

## 2019-10-22 PROCEDURE — 20610 DRAIN/INJ JOINT/BURSA W/O US: CPT | Mod: 50,PBBFAC,PN | Performed by: NURSE PRACTITIONER

## 2019-10-22 PROCEDURE — 99203 PR OFFICE/OUTPT VISIT, NEW, LEVL III, 30-44 MIN: ICD-10-PCS | Mod: S$PBB,25,, | Performed by: NURSE PRACTITIONER

## 2019-10-22 PROCEDURE — 20610 DRAIN/INJ JOINT/BURSA W/O US: CPT | Mod: 50,S$PBB,, | Performed by: NURSE PRACTITIONER

## 2019-10-22 PROCEDURE — 99203 OFFICE O/P NEW LOW 30 MIN: CPT | Mod: S$PBB,25,, | Performed by: NURSE PRACTITIONER

## 2019-10-22 PROCEDURE — 99213 OFFICE O/P EST LOW 20 MIN: CPT | Mod: PBBFAC,PN,25 | Performed by: NURSE PRACTITIONER

## 2019-10-22 PROCEDURE — 99999 PR PBB SHADOW E&M-EST. PATIENT-LVL III: CPT | Mod: PBBFAC,,, | Performed by: NURSE PRACTITIONER

## 2019-10-22 PROCEDURE — 97760 ORTHOTIC MGMT&TRAING 1ST ENC: CPT | Mod: S$PBB,GP,, | Performed by: NURSE PRACTITIONER

## 2019-10-22 PROCEDURE — 97760 PR ORTHOTIC MGMT&TRAINJ INITIAL ENC EA 15 MINS: ICD-10-PCS | Mod: S$PBB,GP,, | Performed by: NURSE PRACTITIONER

## 2019-10-22 PROCEDURE — 20610 LARGE JOINT ASPIRATION/INJECTION: R KNEE, L KNEE: ICD-10-PCS | Mod: 50,S$PBB,, | Performed by: NURSE PRACTITIONER

## 2019-10-22 PROCEDURE — 99999 PR PBB SHADOW E&M-EST. PATIENT-LVL III: ICD-10-PCS | Mod: PBBFAC,,, | Performed by: NURSE PRACTITIONER

## 2019-10-22 RX ORDER — TRIAMCINOLONE ACETONIDE 40 MG/ML
40 INJECTION, SUSPENSION INTRA-ARTICULAR; INTRAMUSCULAR
Status: DISCONTINUED | OUTPATIENT
Start: 2019-10-22 | End: 2019-10-22 | Stop reason: HOSPADM

## 2019-10-22 RX ORDER — DICLOFENAC SODIUM 10 MG/G
2 GEL TOPICAL 4 TIMES DAILY
Qty: 1 TUBE | Refills: 5 | Status: SHIPPED | OUTPATIENT
Start: 2019-10-22 | End: 2020-08-03

## 2019-10-22 RX ORDER — MELOXICAM 15 MG/1
15 TABLET ORAL DAILY
Qty: 120 TABLET | Refills: 0 | Status: SHIPPED | OUTPATIENT
Start: 2019-10-22 | End: 2020-08-03

## 2019-10-22 RX ADMIN — TRIAMCINOLONE ACETONIDE 40 MG: 40 INJECTION, SUSPENSION INTRA-ARTICULAR; INTRAMUSCULAR at 09:10

## 2019-10-22 NOTE — LETTER
October 22, 2019      Yanely Li PA-C  [address] [facility] [address] [place] [address]           [facility]  [address] [place] [address]  Phone: [phone]          Patient: Hi Salmeron   MR Number: [id number]   YOB: 1975   Date of Visit: 10/22/2019       Dear Yanely Li:    Thank you for referring Hi Salmeron to me for evaluation. Attached you will find relevant portions of my assessment and plan of care.    If you have questions, please do not hesitate to call me. I look forward to following Hi Salmeron along with you.    Sincerely,    Dagmar Grier, APRN    Enclosure  CC:  No Recipients    If you would like to receive this communication electronically, please contact [email or web address] or [phone] to request more information on [facility] [employer] access.    For providers and/or their staff who would like to refer a patient to [facility], please contact us through our one-stop-shop provider referral line, [facility], at [phone].    If you feel you have received this communication in error or would no longer like to receive these types of communications, please e-mail [email or web address]

## 2019-10-22 NOTE — PROCEDURES
Large Joint Aspiration/Injection: R knee, L knee  Date/Time: 10/22/2019 9:00 AM  Performed by: RAFAEL Finn  Authorized by: RAFAEL Finn     Consent Done?:  Yes (Verbal)  Indications:  Pain  Timeout: Prior to procedure the correct patient, procedure, and site was verified    Anesthesia    Anesthetic: topical anesthetic    Location:  Knee  Site:  R knee and L knee  Prep: Patient was prepped and draped in usual sterile fashion    Needle size:  22 G  Ultrasonic Guidance for needle placement: No  Approach:  Anterolateral  Medications:  40 mg triamcinolone acetonide 40 mg/mL; 40 mg triamcinolone acetonide 40 mg/mL  Patient tolerance:  Patient tolerated the procedure well with no immediate complications

## 2019-10-22 NOTE — PROGRESS NOTES
"DATE: 10/22/2019  PATIENT: Hi Salmeron  REFERRING MD:   CHIEF COMPLAINT:   Chief Complaint   Patient presents with    Right Knee - Pain       HISTORY:  Hi Salmeron is a 44 y.o. male  who presents for initial evaluation of his Bilateral knee pain, the right is greater than the left. He is a new patient to me referred by JT Lemons for orthopedic evaluation.  His pain rating today is 2/10.  He denies any trauma.  He is in the [employer] and was running for boot camp August 29 when he felt pain in his lateral knee that felt like "unraveling".  He denies feeling or hearing any pop or bruising.  He has had some swelling.  He rested his knee and has applied ice and taken over the counter antiinflammatory pain medication.  He notes the pain decreased after about 4 weeks of rest.  He is preparing to leave next week for a 4 week long  training course and he is concerned about the running and further injuring his knee.  He notes increased pain with stair climbing, deep squats or lunges and running.  He presents today for orthopedic consultation.  He is an [employer] employee and works in [employer].    PAST MEDICAL/SURGICAL HISTORY:  History reviewed. No pertinent past medical history.  History reviewed. No pertinent surgical history.    Current Medications:   Current Outpatient Medications:     ascorbate Ca-multivit-min (VITAMIN C) 1,000 mg PwEP, Take by mouth once daily., Disp: , Rfl:     gabapentin (NEURONTIN) 300 MG capsule, TK ONE C PO TID, Disp: , Rfl: 1    meloxicam (MOBIC) 15 MG tablet, Take 1 tablet (15 mg total) by mouth once daily. Take with food, Disp: 30 tablet, Rfl: 1    multivitamin (ONE DAILY MULTIVITAMIN) per tablet, Take 1 tablet by mouth once daily., Disp: , Rfl:     diclofenac sodium (VOLTAREN) 1 % Gel, Apply 2 grams topically 4 (four) times daily., Disp: 1 Tube, Rfl: 5    meloxicam (MOBIC) 15 MG tablet, Take 1 tablet (15 mg total) by mouth once daily., Disp: 120 tablet, Rfl: 0    Family History: " family history was reviewed and is noncontributory  Social History:   Social History     Socioeconomic History    Marital status:      Spouse name: Not on file    Number of children: Not on file    Years of education: Not on file    Highest education level: Not on file   Occupational History     Employer: [employer]   Social Needs    Financial resource strain: Not hard at all    Food insecurity:     Worry: Never true     Inability: Never true    Transportation needs:     Medical: No     Non-medical: No   Tobacco Use    Smoking status: Former Smoker    Smokeless tobacco: Never Used    Tobacco comment: stopped [id number]   Substance and Sexual Activity    Alcohol use: Yes     Frequency: Monthly or less     Drinks per session: 1 or 2     Binge frequency: Never    Drug use: No    Sexual activity: Not on file   Lifestyle    Physical activity:     Days per week: 4 days     Minutes per session: 30 min    Stress: Very much   Relationships    Social connections:     Talks on phone: Once a week     Gets together: Patient refused     Attends Yarsani service: Not on file     Active member of club or organization: No     Attends meetings of clubs or organizations: Never     Relationship status:    Other Topics Concern    Not on file   Social History Narrative    Not on file       ROS:  Constitution: Negative for chills, fever, and sweats. Negative for unexplained weight loss.  Eyes: no redness, no discharge  Ears: no ear pain or tinnitus  Cardiovascular: Negative for chest pain, irregular heartbeat, leg swelling and palpitations.   Respiratory: Negative for cough and shortness of breath.   Gastrointestinal: Negative for abdominal pain, nausea and vomiting.   Genitourinary: Negative for bladder incontinence and dysuria.   Neurological: Negative for numbness.   Psychiatric/Behavioral: Negative for behavior changes.   Endocrine: Negative for palpitations.  "  Hematologic/Lymphatic: Negative for bleeding disorders.  Skin: Negative for pruritis or rash.     PHYSICAL EXAM:  Right Knee Exam     Muscle Strength   The patient has normal right knee strength.    Tenderness   The patient is experiencing tenderness in the lateral joint line.    Range of Motion   The patient has normal right knee ROM.    Tests   Varus: negative Valgus: negative  Patellar apprehension: negative    Other   Erythema: absent  Sensation: normal  Pulse: present  Swelling: none  Effusion: no effusion present      Left Knee Exam     Muscle Strength   The patient has normal left knee strength.    Tenderness   The patient is experiencing no tenderness.     Range of Motion   The patient has normal left knee ROM.    Tests   Varus: negative Valgus: negative  Patellar apprehension: negative    Other   Erythema: absent  Sensation: normal  Pulse: present  Swelling: none  Effusion: no effusion present           Constitutional:  Hi Salmeron is a well developed, well nourished male in no acute distress.   Vitals:    10/22/19 0824   BP: 127/80   Pulse: 78   Weight: 89.8 kg (197 lb 15.6 oz)   Height: 5' 11" (1.803 m)   PainSc:   2   PainLoc: Knee     Psychiatric: pleasant,normal mood and affect, behavior is normal  Neurological:  Sensation intact to light touch, normal reflexes. Coordination normal.   Skin: warm, dry, intact  Cardiovascular: capillary refill less than 3 seconds, pulses 2+      IMAGING:   X-ray obtained and personally reviewed with patient. Radiologist report as follows:  X-Ray Knee 3 View Right  EXAMINATION:  XR KNEE 3 VIEW RIGHT    CLINICAL HISTORY:  Pain in right knee    TECHNIQUE:  AP, lateral, and Merchant views of the right knee were performed.    COMPARISON:  None    FINDINGS:  There is slight lateral patellar tilt.  There is a small joint effusion on the right.  There is some medial compartment osteophyte formation bilaterally.    Electronically signed by: Alejandro Centeno, " MD  Date:    10/21/2019  Time:    18:16         ASSESSMENT:   1. Bilateral primary osteoarthritis of knee  diclofenac sodium (VOLTAREN) 1 % Gel    meloxicam (MOBIC) 15 MG tablet   2. Acute pain of right knee  Ambulatory referral/consult to Orthopedics         PLAN:  The nature of the diagnosis, using models and diagrams when appropriate, was explained to the patient in detail.  I have explained to Mr Salmeron that his symptoms and xray are consistent with mild to moderate osteoarthritis. Treatment option discussed included non-operative measures of custom orthotic, weight loss, rest,  modification of activities, application of ice, elevation of extremity, compression, over the counter pain/antiinflammatory relief, physica/occupational therapy, cortisone injection and viscosupplementation.  More aggressive treatment options include referal to orthopedic surgeon.  All questions answered and the patient wishes to proceed today with bilateral cortisone injection and custom knee braces.  Bilateral knee cortisone injection performed today (see procedure documentation).  I have instructed to monitor injection site for signs and symptoms of inflammation/infection.  I have instructed to elevate and apply ice to knees this evening.  Reva Weller and I performed a custom sling orthotic /brace adjustment, fitting and training with the patient. The patient demonstrated understanding and proper care. This was performed for 15 minutes.  I have written him a prescription for meloxicam to take as needed daily for knee pain.  I have also prescribed voltaren gel to apply up to 4 times daily as needed.  I have scheduled him to return for follow up and bilateral Euflexxa injections after returning from his training course.    I have recommended that he avoid running as it is a high impact activity that irritates knee osteoarthritis.  I recommend him modifying activity to include low impact such as walking, stationary biking and aquatic  activities.  Follow up if no improvement or worsening of symptoms.

## 2019-10-22 NOTE — PATIENT INSTRUCTIONS
Understanding Osteoarthritis of the Knee    A joint is a place where two bones meet. The knee is called a hinge joint. This joint is formed where the thighbone (femur) meets the shinbone (tibia). A healthy knee joint bends freely. Knee osteoarthritis is a condition where parts of the knee joint wear out. This can lead to pain, stiffness, and limited movement.   What is osteoarthritis?  Every joint contains a smooth tissue called cartilage. Cartilage cushions the ends of bones and helps bones in a joint glide smoothly against each other. Knee osteoarthritis occurs when cartilage in the knee joint begins to break down and wear away. Bones may become exposed and rub together. The cartilage may become irritated and rough. This prevents smooth movement of the joint and can lead to pain.  Causes of osteoarthritis of the knee  Causes can include:  · Wear and tear from normal use over time  · Overuse of the knee during sports or work activities  · Being overweight. This increases stress on the knee joint.  · Misalignment of the knee joint  · Injury to the knee  Symptoms of osteoarthritis of the knee  Common symptoms include:  · Pain and swelling around the joint. The pain and swelling get worse with activity and better with rest.  · Grinding sound when moving the knee  · Reduced knee movement  · Knee stiffness. This is often worse first thing in the morning.  Treating osteoarthritis of the knee  Osteoarthritis is a long-term condition. Treatment usually focuses on managing symptoms. Treatment may include:  · Over-the-counter or prescription medicines taken by mouth to help relieve pain and swelling  · Injections of medicine into the joint to help relieve symptoms for a time  · A weight-loss plan for people who are overweight  · A plan of physical therapy and exercises to improve the strength and flexibility of the muscles around the knee  · Heat or cold therapy to help relieve pain and stiffness  · Assistive devices that  help with movement, such as a cane or a walker  · Assistive devices that make activities of daily life easier, such as raised toilet seats or shower bars  If other treatments dont do enough to relieve symptoms, you may need surgery to replace the joint. During this surgery, the damaged joint is removed. An artificial knee joint is then put into place. This can help relieve pain and stiffness and restore movement of the knee.     When to call your healthcare provider  Call your healthcare provider right away if you have any of these:  · Fever of 100.4°F (38°C) or higher, or as directed  · Symptoms that dont get better with prescribed medicines or get worse  · New symptoms   Date Last Reviewed: 3/10/2016  © 3298-7184 [employer]. [address] [place] [address], [place] [address]. All rights reserved. This information is not intended as a substitute for professional medical care. Always follow your healthcare professional's instructions.

## 2020-03-03 ENCOUNTER — TELEPHONE (OUTPATIENT)
Dept: ORTHOPEDICS | Facility: CLINIC | Age: 45
End: 2020-03-03

## 2020-04-14 ENCOUNTER — TELEPHONE (OUTPATIENT)
Dept: ORTHOPEDICS | Facility: CLINIC | Age: 45
End: 2020-04-14

## 2020-04-14 NOTE — TELEPHONE ENCOUNTER
Attempted to contact pt to reschedule appointment from 3/2020 due to COVID-19 recommendations.  Left message for pt to return call.  Will offer appointment with RAFAEL Miranda.

## 2020-04-21 DIAGNOSIS — Z01.84 ANTIBODY RESPONSE EXAMINATION: ICD-10-CM

## 2020-05-12 ENCOUNTER — LAB VISIT (OUTPATIENT)
Dept: LAB | Facility: HOSPITAL | Age: 45
End: 2020-05-12
Attending: INTERNAL MEDICINE
Payer: OTHER GOVERNMENT

## 2020-05-12 DIAGNOSIS — Z01.84 ANTIBODY RESPONSE EXAMINATION: ICD-10-CM

## 2020-05-12 LAB — SARS-COV-2 IGG SERPLBLD QL IA.RAPID: NEGATIVE

## 2020-05-12 PROCEDURE — 36415 COLL VENOUS BLD VENIPUNCTURE: CPT | Mod: PO

## 2020-05-12 PROCEDURE — 86769 SARS-COV-2 COVID-19 ANTIBODY: CPT

## 2020-05-13 ENCOUNTER — OFFICE VISIT (OUTPATIENT)
Dept: URGENT CARE | Facility: CLINIC | Age: 45
End: 2020-05-13
Payer: OTHER GOVERNMENT

## 2020-05-13 VITALS
OXYGEN SATURATION: 96 % | SYSTOLIC BLOOD PRESSURE: 117 MMHG | TEMPERATURE: 99 F | HEIGHT: 71 IN | BODY MASS INDEX: 26.88 KG/M2 | WEIGHT: 192 LBS | DIASTOLIC BLOOD PRESSURE: 77 MMHG | HEART RATE: 98 BPM

## 2020-05-13 DIAGNOSIS — L03.115 CELLULITIS AND ABSCESS OF RIGHT LOWER EXTREMITY: Primary | ICD-10-CM

## 2020-05-13 DIAGNOSIS — L02.415 CELLULITIS AND ABSCESS OF RIGHT LOWER EXTREMITY: Primary | ICD-10-CM

## 2020-05-13 PROCEDURE — 99214 PR OFFICE/OUTPT VISIT, EST, LEVL IV, 30-39 MIN: ICD-10-PCS | Mod: S$GLB,,, | Performed by: PHYSICIAN ASSISTANT

## 2020-05-13 PROCEDURE — 99214 OFFICE O/P EST MOD 30 MIN: CPT | Mod: S$GLB,,, | Performed by: PHYSICIAN ASSISTANT

## 2020-05-13 RX ORDER — CEPHALEXIN 500 MG/1
500 CAPSULE ORAL EVERY 6 HOURS
Qty: 40 CAPSULE | Refills: 0 | Status: SHIPPED | OUTPATIENT
Start: 2020-05-13 | End: 2020-05-23

## 2020-05-13 RX ORDER — MUPIROCIN 20 MG/G
OINTMENT TOPICAL
Qty: 22 G | Refills: 1 | Status: SHIPPED | OUTPATIENT
Start: 2020-05-13 | End: 2020-08-03

## 2020-05-13 NOTE — PROGRESS NOTES
"Subjective:       Patient ID: Hi Salmeron is a 44 y.o. male.    Vitals:  height is 5' 11" (1.803 m) and weight is 87.1 kg (192 lb). His temperature is 98.5 °F (36.9 °C). His blood pressure is 117/77 and his pulse is 98. His oxygen saturation is 96%.     Chief Complaint: Recurrent Skin Infections    Patient presents to clinic today with redness and swelling to the back of his lower right leg. Patient states he was bit/stung yesterday while walking in his yard. States his wife squeezed his calf and did get a black stinger out. Denies any difficulty ambulating. Denies decreased strength or sensation. Denies numbness or paresthesias.     Other   This is a new problem. The current episode started yesterday. The problem occurs constantly. The problem has been unchanged. Pertinent negatives include no abdominal pain, anorexia, arthralgias, change in bowel habit, chest pain, chills, congestion, coughing, diaphoresis, fatigue, fever, headaches, joint swelling, myalgias, nausea, neck pain, numbness, rash, sore throat, swollen glands, urinary symptoms, vertigo, visual change, vomiting or weakness. Nothing aggravates the symptoms. He has tried nothing for the symptoms. The treatment provided no relief.       Constitution: Negative for chills, sweating, fatigue and fever.   HENT: Negative for congestion and sore throat.    Neck: Negative for neck pain and painful lymph nodes.   Cardiovascular: Negative for chest pain and leg swelling.   Eyes: Negative for double vision and blurred vision.   Respiratory: Negative for cough and shortness of breath.    Gastrointestinal: Negative for abdominal pain, nausea, vomiting and diarrhea.   Genitourinary: Negative for dysuria, frequency and urgency.   Musculoskeletal: Negative for joint pain, joint swelling, muscle cramps and muscle ache.   Skin: Negative for color change, pale and rash.   Allergic/Immunologic: Negative for seasonal allergies.   Neurological: Negative for dizziness, history " of vertigo, light-headedness, passing out, headaches and numbness.   Hematologic/Lymphatic: Negative for swollen lymph nodes, easy bruising/bleeding and history of blood clots. Does not bruise/bleed easily.   Psychiatric/Behavioral: Negative for nervous/anxious, sleep disturbance and depression. The patient is not nervous/anxious.        Objective:      Physical Exam   Constitutional: He is oriented to person, place, and time. Vital signs are normal. He appears well-developed and well-nourished. He is active and cooperative. No distress.   HENT:   Head: Normocephalic and atraumatic.   Nose: Nose normal.   Mouth/Throat: Oropharynx is clear and moist and mucous membranes are normal.   Eyes: Conjunctivae and lids are normal.   Neck: Trachea normal, normal range of motion, full passive range of motion without pain and phonation normal. Neck supple.   Cardiovascular: Normal rate, regular rhythm, normal heart sounds, intact distal pulses and normal pulses. Exam reveals no gallop and no friction rub.   No murmur heard.  Pulmonary/Chest: Effort normal and breath sounds normal. No stridor. No respiratory distress. He has no wheezes. He has no rales.   Abdominal: Soft. Normal appearance and bowel sounds are normal. He exhibits no abdominal bruit, no pulsatile midline mass and no mass.   Musculoskeletal: Normal range of motion. He exhibits edema and tenderness. He exhibits no deformity.        Right lower leg: He exhibits tenderness and swelling. He exhibits no bony tenderness, no edema, no deformity and no laceration.        Legs:  Cleaned skin with alcohol square and used forceps to remove scab. No stinger retained in skin. Applied mupirocin. Sterile non-stick gauze and coban applied. Pt tolerated well.    Neurological: He is alert and oriented to person, place, and time. He has normal strength and normal reflexes. No sensory deficit.   Skin: Skin is warm, dry, intact and not diaphoretic. not right lower leg  Psychiatric: He  has a normal mood and affect. His speech is normal and behavior is normal. Judgment and thought content normal. Cognition and memory are normal.   Nursing note and vitals reviewed.            Assessment:       1. Cellulitis and abscess of right lower extremity        Plan:     Keflex prescribed for resolution of cellulitis. No stinger retained and skin was cleaned and wrapped. Pt tolerated well. Discussed to elevate leg and he may ice for 10-15 min daily to reduce swelling and inflammation. ER precautions given should streaking up the leg occur, fever, worsening swelling and pain. Patient verbalized understanding and all of his questions were answered.     Cellulitis and abscess of right lower extremity  -     mupirocin (BACTROBAN) 2 % ointment; Apply to affected area 3 times daily  Dispense: 22 g; Refill: 1  -     cephALEXin (KEFLEX) 500 MG capsule; Take 1 capsule (500 mg total) by mouth every 6 (six) hours. for 10 days  Dispense: 40 capsule; Refill: 0      Patient Instructions       Discharge Instructions for Cellulitis  You have been diagnosed with cellulitis. This is an infection in the deepest layer of the skin. In some cases, the infection also affects the muscle. Cellulitis is caused by bacteria. The bacteria can enter the body through broken skin. This can happen with a cut, scratch, animal bite, or an insect bite that has been scratched. You may have been treated in the hospital with antibiotics and fluids. You will likely be given a prescription for antibiotics to take at home. This sheet will help you take care of yourself at home.  Home care  When you are home:  · Take the prescribed antibiotic medicine you are given as directed until it is gone. Take it even if you feel better. It treats the infection and stops it from returning. Not taking all the medicine can make future infections hard to treat.  · Keep the infected area clean.  · When possible, raise the infected area above the level of your heart.  This helps keep swelling down.  · Talk with your healthcare provider if you are in pain. Ask what kind of over-the-counter medicine you can take for pain.  · Apply clean bandages as advised.  · Take your temperature once a day for a week.  · Wash your hands often to prevent spreading the infection.  In the future, wash your hands before and after you touch cuts, scratches, or bandages. This will help prevent infection.   When to call your healthcare provider  Call your healthcare provider immediately if you have any of the following:  · Difficulty or pain when moving the joints above or below the infected area  · Discharge or pus draining from the area  · Fever of 100.4°F (38°C) or higher, or as directed by your healthcare provider  · Pain that gets worse in or around the infected   · Redness that gets worse in or around the infected area, particularly if the area of redness expands to a wider area  · Shaking chills  · Swelling of the infected area  · Vomiting   Date Last Reviewed: 8/1/2016  © 4090-1215 [employer]. [address] [place] [address]. All rights reserved. This information is not intended as a substitute for professional medical care. Always follow your healthcare professional's instructions.      Please follow up with your Primary care provider within 2-5 days if your signs and symptoms have not resolved or worsen.     If your condition worsens or fails to improve we recommend that you receive another evaluation at the emergency room immediately or contact your primary medical clinic to discuss your concerns.   You must understand that you have received an Urgent Care treatment only and that you may be released before all of your medical problems are known or treated. You, the patient, will arrange for follow up care as instructed.     RED FLAGS/WARNING SYMPTOMS DISCUSSED WITH PATIENT THAT WOULD WARRANT EMERGENT MEDICAL ATTENTION. PATIENT VERBALIZED UNDERSTANDING.

## 2020-05-13 NOTE — PATIENT INSTRUCTIONS
Discharge Instructions for Cellulitis  You have been diagnosed with cellulitis. This is an infection in the deepest layer of the skin. In some cases, the infection also affects the muscle. Cellulitis is caused by bacteria. The bacteria can enter the body through broken skin. This can happen with a cut, scratch, animal bite, or an insect bite that has been scratched. You may have been treated in the hospital with antibiotics and fluids. You will likely be given a prescription for antibiotics to take at home. This sheet will help you take care of yourself at home.  Home care  When you are home:  · Take the prescribed antibiotic medicine you are given as directed until it is gone. Take it even if you feel better. It treats the infection and stops it from returning. Not taking all the medicine can make future infections hard to treat.  · Keep the infected area clean.  · When possible, raise the infected area above the level of your heart. This helps keep swelling down.  · Talk with your healthcare provider if you are in pain. Ask what kind of over-the-counter medicine you can take for pain.  · Apply clean bandages as advised.  · Take your temperature once a day for a week.  · Wash your hands often to prevent spreading the infection.  In the future, wash your hands before and after you touch cuts, scratches, or bandages. This will help prevent infection.   When to call your healthcare provider  Call your healthcare provider immediately if you have any of the following:  · Difficulty or pain when moving the joints above or below the infected area  · Discharge or pus draining from the area  · Fever of 100.4°F (38°C) or higher, or as directed by your healthcare provider  · Pain that gets worse in or around the infected   · Redness that gets worse in or around the infected area, particularly if the area of redness expands to a wider area  · Shaking chills  · Swelling of the infected area  · Vomiting   Date Last Reviewed:  8/1/2016  © 1524-7352 [employer], [email or web address]. [address] [place] [address], [place] [address]. All rights reserved. This information is not intended as a substitute for professional medical care. Always follow your healthcare professional's instructions.      Please follow up with your Primary care provider within 2-5 days if your signs and symptoms have not resolved or worsen.     If your condition worsens or fails to improve we recommend that you receive another evaluation at the emergency room immediately or contact your primary medical clinic to discuss your concerns.   You must understand that you have received an Urgent Care treatment only and that you may be released before all of your medical problems are known or treated. You, the patient, will arrange for follow up care as instructed.     RED FLAGS/WARNING SYMPTOMS DISCUSSED WITH PATIENT THAT WOULD WARRANT EMERGENT MEDICAL ATTENTION. PATIENT VERBALIZED UNDERSTANDING.

## 2020-05-21 ENCOUNTER — PATIENT OUTREACH (OUTPATIENT)
Dept: ADMINISTRATIVE | Facility: OTHER | Age: 45
End: 2020-05-21

## 2020-05-21 NOTE — PROGRESS NOTES
Chart reviewed.   Immunizations: Triggered Imm Registry     Orders placed: n/a  Upcoming appts to satisfy RUIZ topics: n/a

## 2020-05-22 ENCOUNTER — OFFICE VISIT (OUTPATIENT)
Dept: ORTHOPEDICS | Facility: CLINIC | Age: 45
End: 2020-05-22
Payer: OTHER GOVERNMENT

## 2020-05-22 VITALS
HEART RATE: 88 BPM | SYSTOLIC BLOOD PRESSURE: 122 MMHG | BODY MASS INDEX: 26.88 KG/M2 | TEMPERATURE: 98 F | HEIGHT: 71 IN | WEIGHT: 192 LBS | DIASTOLIC BLOOD PRESSURE: 85 MMHG

## 2020-05-22 DIAGNOSIS — M62.838 NECK MUSCLE SPASM: ICD-10-CM

## 2020-05-22 DIAGNOSIS — M54.2 NECK PAIN: ICD-10-CM

## 2020-05-22 DIAGNOSIS — M54.2 CHRONIC NECK PAIN: ICD-10-CM

## 2020-05-22 DIAGNOSIS — S83.421A SPRAIN OF LATERAL COLLATERAL LIGAMENT OF RIGHT KNEE, INITIAL ENCOUNTER: Primary | ICD-10-CM

## 2020-05-22 DIAGNOSIS — G89.29 CHRONIC NECK PAIN: ICD-10-CM

## 2020-05-22 DIAGNOSIS — S76.311A STRAIN OF RIGHT HAMSTRING, INITIAL ENCOUNTER: ICD-10-CM

## 2020-05-22 PROCEDURE — 99204 OFFICE O/P NEW MOD 45 MIN: CPT | Mod: S$PBB,,, | Performed by: NURSE PRACTITIONER

## 2020-05-22 PROCEDURE — 99999 PR PBB SHADOW E&M-EST. PATIENT-LVL III: CPT | Mod: PBBFAC,,, | Performed by: NURSE PRACTITIONER

## 2020-05-22 PROCEDURE — 99999 PR PBB SHADOW E&M-EST. PATIENT-LVL III: ICD-10-PCS | Mod: PBBFAC,,, | Performed by: NURSE PRACTITIONER

## 2020-05-22 PROCEDURE — 99204 PR OFFICE/OUTPT VISIT, NEW, LEVL IV, 45-59 MIN: ICD-10-PCS | Mod: S$PBB,,, | Performed by: NURSE PRACTITIONER

## 2020-05-22 PROCEDURE — 99213 OFFICE O/P EST LOW 20 MIN: CPT | Mod: PBBFAC,PN | Performed by: NURSE PRACTITIONER

## 2020-05-22 RX ORDER — MELOXICAM 15 MG/1
TABLET ORAL
Qty: 90 TABLET | Refills: 0 | Status: SHIPPED | OUTPATIENT
Start: 2020-05-22 | End: 2020-08-03

## 2020-05-22 RX ORDER — DIAZEPAM 10 MG/1
10 TABLET ORAL
Qty: 1 TABLET | Refills: 0 | Status: SHIPPED | OUTPATIENT
Start: 2020-05-22 | End: 2020-08-03

## 2020-05-22 RX ORDER — MELOXICAM 15 MG/1
15 TABLET ORAL DAILY
Qty: 30 TABLET | Refills: 1 | Status: SHIPPED | OUTPATIENT
Start: 2020-05-22 | End: 2020-05-22

## 2020-05-22 NOTE — PROGRESS NOTES
Chief Complaint   Patient presents with    Right Knee - Pain    Left Knee - Pain         HPI:   This is a 44 y.o. who presents to clinic today complaining of bilateral knee pain for years after repetitive strenuous activity in the  . Pain is progressively worsening on the right. No numbness or tingling. Denies mechanical symptoms.    History reviewed. No pertinent past medical history.  History reviewed. No pertinent surgical history.  Current Outpatient Medications on File Prior to Visit   Medication Sig Dispense Refill    cephALEXin (KEFLEX) 500 MG capsule Take 1 capsule (500 mg total) by mouth every 6 (six) hours. for 10 days 40 capsule 0    diclofenac sodium (VOLTAREN) 1 % Gel Apply 2 grams topically 4 (four) times daily. 1 Tube 5    ascorbate Ca-multivit-min (VITAMIN C) 1,000 mg PwEP Take by mouth once daily.      gabapentin (NEURONTIN) 300 MG capsule TK ONE C PO TID  1    meloxicam (MOBIC) 15 MG tablet Take 1 tablet (15 mg total) by mouth once daily. (Patient not taking: Reported on 5/13/2020) 120 tablet 0    multivitamin (ONE DAILY MULTIVITAMIN) per tablet Take 1 tablet by mouth once daily.      mupirocin (BACTROBAN) 2 % ointment Apply to affected area 3 times daily (Patient not taking: Reported on 5/22/2020) 22 g 1    [DISCONTINUED] meloxicam (MOBIC) 15 MG tablet Take 1 tablet (15 mg total) by mouth once daily. Take with food (Patient not taking: Reported on 5/22/2020) 30 tablet 1     No current facility-administered medications on file prior to visit.      Review of patient's allergies indicates:  No Known Allergies  Family History   Problem Relation Age of Onset    Cancer Mother     Hypertension Father     Heart attack Father     Blindness Neg Hx     Cataracts Neg Hx     Diabetes Neg Hx     Glaucoma Neg Hx     Macular degeneration Neg Hx     Retinal detachment Neg Hx     Strabismus Neg Hx     Stroke Neg Hx     Thyroid disease Neg Hx      Social History     Socioeconomic  History    Marital status:      Spouse name: Not on file    Number of children: Not on file    Years of education: Not on file    Highest education level: Not on file   Occupational History     Employer: [employer] ([employer]   Social Needs    Financial resource strain: Not hard at all    Food insecurity:     Worry: Never true     Inability: Never true    Transportation needs:     Medical: No     Non-medical: No   Tobacco Use    Smoking status: Former Smoker    Smokeless tobacco: Never Used    Tobacco comment: stopped 2007-2005   Substance and Sexual Activity    Alcohol use: Yes     Frequency: Monthly or less     Drinks per session: 1 or 2     Binge frequency: Never    Drug use: No    Sexual activity: Not on file   Lifestyle    Physical activity:     Days per week: 4 days     Minutes per session: 30 min    Stress: Very much   Relationships    Social connections:     Talks on phone: Once a week     Gets together: Patient refused     Attends Mosque service: Not on file     Active member of club or organization: No     Attends meetings of clubs or organizations: Never     Relationship status:    Other Topics Concern    Not on file   Social History Narrative    Not on file       Review of Systems:  Constitutional:  Denies fever or chills   Eyes:  Denies change in visual acuity   HENT:  Denies nasal congestion or sore throat   Respiratory:  Denies cough or shortness of breath   Cardiovascular:  Denies chest pain or edema   GI:  Denies abdominal pain, nausea, vomiting, bloody stools or diarrhea   :  Denies dysuria   Integument:  Denies rash   Neurologic:  Denies headache, focal weakness or sensory changes   Endocrine:  Denies polyuria or polydipsia   Lymphatic:  Denies swollen glands   Psychiatric:  Denies depression or anxiety     Physical Exam:   Constitutional:  Well developed, well nourished, no acute distress, non-toxic appearance   Integument:  Well hydrated, no rash    Lymphatic:  No lymphadenopathy noted   Neurologic:  Alert & oriented x 3  Psychiatric:  Speech and behavior appropriate   Eyes: EOMI  Gi: abdomen soft    Bilateral Knee Exam    Tenderness   The patient is experiencing lateral tenderness and TTP over hamstring    Range of Motion   0-130     Muscle Strength     The patient has normal knee strength.    Tests   Roxanne:  Medial - negative  Lachman:  Anterior - negative      Varus: positve  Valgus: negative  Patellar Apprehension: negative    Other   Erythema: absent  Sensation: normal  Pulse: present  Swelling: none      X-rays were performed, personally reviewed by me and findings discussed with the patient.  3 views of the right knee show mild degenerative changes in the medial compartment    Sprain of lateral collateral ligament of right knee, initial encounter  -     MRI Knee Without Contrast Right; Future; Expected date: 05/22/2020    Strain of right hamstring, initial encounter    Other orders  -     diazePAM (VALIUM) 10 MG Tab; Take 1 tablet (10 mg total) by mouth On call Procedure (30 minutes prior to MRI).  Dispense: 1 tablet; Refill: 0  -     Discontinue: meloxicam (MOBIC) 15 MG tablet; Take 1 tablet (15 mg total) by mouth once daily. Take with food  Dispense: 30 tablet; Refill: 1          MRI Right knee ordered.  Will call patient with results. Not cleared for drill at this time.

## 2020-05-25 ENCOUNTER — HOSPITAL ENCOUNTER (OUTPATIENT)
Dept: RADIOLOGY | Facility: HOSPITAL | Age: 45
Discharge: HOME | End: 2020-05-25
Attending: NURSE PRACTITIONER
Payer: OTHER GOVERNMENT

## 2020-05-25 DIAGNOSIS — S83.421A SPRAIN OF LATERAL COLLATERAL LIGAMENT OF RIGHT KNEE, INITIAL ENCOUNTER: ICD-10-CM

## 2020-05-25 PROCEDURE — 73721 MRI KNEE WITHOUT CONTRAST RIGHT: ICD-10-PCS | Mod: 26,RT,, | Performed by: RADIOLOGY

## 2020-05-25 PROCEDURE — 73721 MRI JNT OF LWR EXTRE W/O DYE: CPT | Mod: 26,RT,, | Performed by: RADIOLOGY

## 2020-05-25 PROCEDURE — 73721 MRI JNT OF LWR EXTRE W/O DYE: CPT | Mod: TC,PO,RT

## 2020-05-29 ENCOUNTER — PATIENT MESSAGE (OUTPATIENT)
Dept: ORTHOPEDICS | Facility: CLINIC | Age: 45
End: 2020-05-29

## 2020-08-03 ENCOUNTER — OFFICE VISIT (OUTPATIENT)
Dept: FAMILY MEDICINE | Facility: CLINIC | Age: 45
End: 2020-08-03
Payer: OTHER GOVERNMENT

## 2020-08-03 ENCOUNTER — HOSPITAL ENCOUNTER (OUTPATIENT)
Dept: RADIOLOGY | Facility: HOSPITAL | Age: 45
Discharge: HOME | End: 2020-08-03
Attending: PHYSICIAN ASSISTANT
Payer: OTHER GOVERNMENT

## 2020-08-03 ENCOUNTER — TELEPHONE (OUTPATIENT)
Dept: FAMILY MEDICINE | Facility: CLINIC | Age: 45
End: 2020-08-03

## 2020-08-03 VITALS
OXYGEN SATURATION: 97 % | HEIGHT: 71 IN | TEMPERATURE: 99 F | DIASTOLIC BLOOD PRESSURE: 86 MMHG | BODY MASS INDEX: 28.73 KG/M2 | WEIGHT: 205.25 LBS | SYSTOLIC BLOOD PRESSURE: 132 MMHG | HEART RATE: 74 BPM

## 2020-08-03 DIAGNOSIS — M75.81 ROTATOR CUFF TENDONITIS, RIGHT: Primary | ICD-10-CM

## 2020-08-03 DIAGNOSIS — M25.511 RIGHT SHOULDER PAIN, UNSPECIFIED CHRONICITY: ICD-10-CM

## 2020-08-03 DIAGNOSIS — G56.21 CUBITAL TUNNEL SYNDROME ON RIGHT: ICD-10-CM

## 2020-08-03 DIAGNOSIS — G89.29 CHRONIC NECK PAIN: ICD-10-CM

## 2020-08-03 DIAGNOSIS — M54.2 CHRONIC NECK PAIN: ICD-10-CM

## 2020-08-03 PROCEDURE — 99214 OFFICE O/P EST MOD 30 MIN: CPT | Mod: S$PBB,,, | Performed by: PHYSICIAN ASSISTANT

## 2020-08-03 PROCEDURE — 99215 OFFICE O/P EST HI 40 MIN: CPT | Mod: PBBFAC,25,PO | Performed by: PHYSICIAN ASSISTANT

## 2020-08-03 PROCEDURE — 99999 PR PBB SHADOW E&M-EST. PATIENT-LVL V: CPT | Mod: PBBFAC,,, | Performed by: PHYSICIAN ASSISTANT

## 2020-08-03 PROCEDURE — 99214 PR OFFICE/OUTPT VISIT, EST, LEVL IV, 30-39 MIN: ICD-10-PCS | Mod: S$PBB,,, | Performed by: PHYSICIAN ASSISTANT

## 2020-08-03 PROCEDURE — 99999 PR PBB SHADOW E&M-EST. PATIENT-LVL V: ICD-10-PCS | Mod: PBBFAC,,, | Performed by: PHYSICIAN ASSISTANT

## 2020-08-03 PROCEDURE — 73030 XR SHOULDER COMPLETE 2 OR MORE VIEWS RIGHT: ICD-10-PCS | Mod: 26,RT,, | Performed by: RADIOLOGY

## 2020-08-03 PROCEDURE — 73030 X-RAY EXAM OF SHOULDER: CPT | Mod: TC,FY,PO,RT

## 2020-08-03 PROCEDURE — 73030 X-RAY EXAM OF SHOULDER: CPT | Mod: 26,RT,, | Performed by: RADIOLOGY

## 2020-08-03 RX ORDER — MELOXICAM 7.5 MG/1
7.5 TABLET ORAL DAILY
Qty: 15 TABLET | Refills: 0 | Status: SHIPPED | OUTPATIENT
Start: 2020-08-03 | End: 2020-08-18

## 2020-08-03 NOTE — PATIENT INSTRUCTIONS
A few reminders from today:    Mobic for pain/inflammation.   PT referral.   X rays today.   F/U 1 month.     Follow up with me if needed.   Please go to ER/urgent care if after hours or symptoms persist/worsen.         Do not hesitate to get in touch with me should you have any further questions.     Thank you for trusting me with your care!  I wish you health and happiness.    -Irma Cardoso PA-C          Understanding Rotator Cuff Tendonitis    Tendons are tough tissues that connect muscles to bone. A group of 4 muscles and their tendons form a cuff around the head of the upper arm bone. This is called the rotator cuff. It connects the upper arm to the shoulder blade. It gives the shoulder joint stability and strength.  If tendons are injured or strained, they may become irritated and swollen (inflamed). This is called tendonitis. Rotator cuff tendonitis may cause shoulder pain and loss of function.  What causes rotator cuff tendonitis?  Tendonitis results when the rotator cuff tendons are injured or overworked. The most common cause of injury is repetitive overhead activities. These can be work-related activities such as reaching, pushing, or lifting. Or they can be sports-related activities such as throwing, swimming, or lifting weights.  Symptoms of rotator cuff tendonitis  Pain on the side of the upper arm is the most common symptom. Pain may get worse with overhead movements or when you raise the arm above shoulder level. It may also hurt to lie on the shoulder at night.  Treatment for rotator cuff tendonitis  Treatment may include the following:  · Active rest. This lets the rotator cuff heal. Active rest means using your arm and shoulder, but avoiding activities that cause pain, such as reaching overhead or sleeping on the shoulder.  · Cold packs. Putting ice packs on the shoulder helps reduce swelling and relieve pain.  · Pain medicines. Prescription or over-the-counter pain medicines can help relieve  pain and swelling.  · Arm and shoulder exercises. These help keep the shoulder joint mobile as it heals. They also help improve the strength of muscles around the joint.  Possible complications  It might be tempting to stop using your shoulder completely to avoid pain. But doing so may lead to a condition called frozen shoulder. To help prevent this, following instructions you are given for active rest and for doing exercises to help your shoulder heal.  When to call your healthcare provider  Call your healthcare provider right away if you have any of these:  · Fever of 100.4°F (38°C) or higher, or as directed  · Symptoms that dont get better, or get worse  · New symptoms   Date Last Reviewed: 3/10/2016  © 6979-0812 [employer]. [address] [place] [address], [place] [address]. All rights reserved. This information is not intended as a substitute for professional medical care. Always follow your healthcare professional's instructions.                                                                                                                                                                                                                          To whom it may concern,     Mr. Salmeron is currently being treated for a shoulder injury. Part of his treatment plan includes physical therapy and avoiding unnecessary strain on the shoulder, including upper body weight bearing exercises. Please excuse him from these duties. He is at limited duty. No more than five pounds of weight for right arm.     Please call office of [facility] [place] should you have any questions or concerns.     Thank you,   Irma Cardoso PA-C
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
## 2020-08-04 ENCOUNTER — CLINICAL SUPPORT (OUTPATIENT)
Dept: REHABILITATION | Facility: HOSPITAL | Age: 45
End: 2020-08-04
Attending: PHYSICIAN ASSISTANT
Payer: OTHER GOVERNMENT

## 2020-08-04 DIAGNOSIS — M75.81 ROTATOR CUFF TENDONITIS, RIGHT: ICD-10-CM

## 2020-08-04 DIAGNOSIS — G56.21 CUBITAL TUNNEL SYNDROME ON RIGHT: ICD-10-CM

## 2020-08-04 PROCEDURE — 97161 PT EVAL LOW COMPLEX 20 MIN: CPT | Mod: PO | Performed by: PHYSICAL THERAPIST

## 2020-08-04 NOTE — PLAN OF CARE
"[facility]  Physical Therapy Initial Evaluation    Name: Hi Slameron  Clinic Number: [id number]    Therapy Diagnosis:   Encounter Diagnoses   Name Primary?    Rotator cuff tendonitis, right     Cubital tunnel syndrome on right      Physician: Irma Cardoso PA-C    Physician Orders: PT Eval and Treat   Medical Diagnosis from Referral: M75.81 (ICD-10-CM) - Rotator cuff tendonitis, right G56.21 (ICD-10-CM) - Cubital tunnel syndrome on right   Evaluation Date: 8/4/2020  Authorization Period Expiration: 08/03/2021   Plan of Care Expiration: 9/18/2020  Visit # / Visits authorized: 1/ 16    Time In: 930  Time Out: 1015  Total Billable Time: 40 minutes    Precautions: Standard    Subjective   Date of onset: about 2 weeks  History of current condition - Hi reports: no known BRANT, He is R hand dominant. He has been experiencing R med elbow pain and 4th and 5th digit paraesthesia. He would like to take a PT test in Oct for the [employer]. He has been unable to perform push ups. He had knee pain that is improving. He does have pain with lying on the R shoulder. Pain + for reaching across and over head. He works in [employer]. He may have injured the shoulder and arm while lifting a 100# device for his son's condition. "Sharp" shoulder pain. "pop" at times with sharp pain radiating from ant shoulder to forearm, audible "click" heard in tx room.     Medical History:   No past medical history on file.    Surgical History:   Hi Salmeron  has no past surgical history on file.    Medications:   Hi has a current medication list which includes the following prescription(s): meloxicam and multivitamin.    Allergies:   Review of patient's allergies indicates:  No Known Allergies     Imaging, xray: FINDINGS:  Spurring is noted at the acromioclavicular joint.  No obvious fracture or dislocation is noted.    Prior Therapy: none, previous therapy for L sided neck pain  Social History:  lives with their " spouse  Occupation: [employer]  Prior Level of Function: No shoulder or elbow pain prior to sx onset  Current Level of Function: impaired sleeping, reaching, push ups    Pain:  Current 6/10, worst 9/10, best 3/10   Location: right elbow  and shoulder   Description: Aching and Sharp  Aggravating Factors: reaching, push ups  Easing Factors: pain medication and rest    Pts goals: improve condition to return to being active    Objective     Observation: FHP, RS    Active Range of Motion: Measured in degrees    Shoulder Left Right   Flexion 160 152   Abduction 180 170     Over head reach: WNL B  Behind back: L inf scap, R last rib    Upper Extremity Strength    Shoulder Left Right   Shoulder flexion: 5/5 5/5 +pain   Shoulder Abduction: 5/5 4+/5   Shoulder ER 5/5 4+/5   Shoulder IR 5/5 4+/5     B elbow flex: WNL  R shld ext: 0 deg  L shoulder ext: -5 deg    Special Tests:     Shoulder Right   AC Joint Compression Test Negative   Empty Can Test Negative   Drop Arm test Negative   Subscaputlaris Lift Off Negative   Compression/rot Negative   O'richard's test Positive   Hawkin's Kenndy Negative     Pos Ulnar Tinel's L    Palpation Shoulder:  Pos pain LHB L, pos pain ulnar n. At groove.    CMS Impairment/Limitation/Restriction for FOTO na Survey    Therapist reviewed FOTO scores for Hi Salmeron on 8/4/2020.   FOTO documents entered into [facility] - see Media section.    Limitation Score: na%         TREATMENT   Treatment Time In: 0  Treatment Time Out: 0  Total Treatment time separate from Evaluation: 0 minutes    Treatment not rendered due to time with eval and multijoint exam.    Home Exercises and Patient Education Provided    Education provided:   - Possible brace for ulnar nerve to be discussed, for night sleeping.  -Pt/family was provided educational information, including: role of PT, role of pt/caregiver, goals for PT, POC, scheduling, and attendance policy.- pt verbalized understanding.    Written Home Exercises  Provided: na.  Exercises were reviewed and Hi was able to demonstrate them prior to the end of the session.  Hi demonstrated good  understanding of the education provided.     See EMR under na for exercises provided na.    Assessment   Hi is a 45 y.o. male referred to outpatient Physical Therapy with a medical diagnosis of Rotator cuff tendonitis, right and (ICD-10-CM) Cubital tunnel syndrome on right   . Pt presents with impaired ROM, strength, and neural tension of the R shld and elbow. He is limited in lifting and reaching. He is also limited with health and fitness due to this. He may have a small labral tear.    Pt prognosis is Good.   Pt will benefit from skilled outpatient Physical Therapy to address the deficits stated above and in the chart below, provide pt/family education, and to maximize pt's level of independence.     Plan of care discussed with patient: Yes  Pt's spiritual, cultural and educational needs considered and patient is agreeable to the plan of care and goals as stated below:     Anticipated Barriers for therapy: none    Medical Necessity is demonstrated by the following  History  Co-morbidities and personal factors that may impact the plan of care Co-morbidities:   na    Personal Factors:   no deficits     low   Examination  Body Structures and Functions, activity limitations and participation restrictions that may impact the plan of care Body Regions:   upper extremities    Body Systems:    ROM  strength  motor control  motor learning    Participation Restrictions:   Impaired reaching and health/fitness    Activity limitations:   Learning and applying knowledge  no deficits    General Tasks and Commands  no deficits    Communication  no deficits    Mobility  lifting and carrying objects    Self care  no deficits    Domestic Life  assisting others    Interactions/Relationships  no deficits    Life Areas  no deficits    Community and Social Life  recreation and leisure         moderate    Clinical Presentation evolving clinical presentation with changing clinical characteristics moderate   Decision Making/ Complexity Score: moderate     Goals:  Short Term Goals: 3 weeks   -Improve R shoulder AROM WNL and equal or better to L shoulder.  -Pt able to interact with his young children under the age of 5 without difficulty or pain limitations.  -Pt able to perform all behind the back activities.    Long Term Goals: 6 weeks   -Pt able to perform health and fitness: push ups, and so on without pain limitations.  -R shoulder strength grossly 5/5 for the above activities.    Plan   Plan of care Certification: 8/4/2020 to  9/18/2020.    Outpatient Physical Therapy 2 times weekly for 6 weeks to include the following interventions: Electrical Stimulation IFC, Manual Therapy, Moist Heat/ Ice, Patient Education, Self Care, Therapeutic Activites and Therapeutic Exercise.     Divina Bernard, PT

## 2020-08-10 ENCOUNTER — CLINICAL SUPPORT (OUTPATIENT)
Dept: REHABILITATION | Facility: HOSPITAL | Age: 45
End: 2020-08-10
Attending: PHYSICIAN ASSISTANT
Payer: OTHER GOVERNMENT

## 2020-08-10 DIAGNOSIS — R29.898 IMPAIRED STRENGTH OF SHOULDER MUSCLES: ICD-10-CM

## 2020-08-10 DIAGNOSIS — M79.2 NERVE PAIN: ICD-10-CM

## 2020-08-10 DIAGNOSIS — M25.611 DECREASED RIGHT SHOULDER RANGE OF MOTION: ICD-10-CM

## 2020-08-10 PROCEDURE — 97112 NEUROMUSCULAR REEDUCATION: CPT | Mod: PO | Performed by: PHYSICAL THERAPIST

## 2020-08-10 PROCEDURE — 97140 MANUAL THERAPY 1/> REGIONS: CPT | Mod: PO | Performed by: PHYSICAL THERAPIST

## 2020-08-10 PROCEDURE — 97110 THERAPEUTIC EXERCISES: CPT | Mod: PO | Performed by: PHYSICAL THERAPIST

## 2020-08-10 NOTE — PROGRESS NOTES
Physical Therapy Daily Treatment Note     Name: Hi Salmeron  Clinic Number: [id number]    Therapy Diagnosis:   Encounter Diagnoses   Name Primary?    Decreased right shoulder range of motion     Impaired strength of shoulder muscles     Nerve pain      Physician: Irma Cardoso PA-C    Visit Date: 8/10/2020  Physician Orders: PT Eval and Treat   Medical Diagnosis from Referral: M75.81 (ICD-10-CM) - Rotator cuff tendonitis, right G56.21 (ICD-10-CM) - Cubital tunnel syndrome on right   Evaluation Date: 8/4/2020  Authorization Period Expiration: 08/03/2021   Plan of Care Expiration: 9/18/2020  Visit # / Visits authorized: 2/ 16    Time In: 700  Time Out: 745  Total Billable Time: 38 minutes    Precautions: Standard    Subjective     Pt reports: no change.   He pending compliant with home exercise program. Home program not able to be given at [facility] due to multiple joint exam.  Response to previous treatment: no adverse effect  Functional change: too early    Pain: 3/10  Location: right elbow and shoulder      Objective     Hi received therapeutic exercises to develop strength, ROM, flexibility and posture for 18 minutes including:    UBE 7' (subjective collected)  Supine AAROM flex and abd, 3# x10 each  Review of HEP    Hi received the following manual therapy techniques: Joint mobilizations, Manual traction and Soft tissue Mobilization were applied to the: R elbow and shoulder for 13 minutes, including:  IASTM with tools to R med elbow (triceps and flexor groups)  Gentle GHJ distraction with oscillations    Hi participated in neuromuscular re-education activities to improve: Proprioception and neural tensionfor 10 minutes. The following activities were included:  Ulnar Nerve glide x10  WS front-back, and side to side x10    Home Exercises Provided and Patient Education Provided     Education provided:   - HEP  - goals    Written Home Exercises Provided: yes.  Exercises were reviewed and Hi was able to  demonstrate them prior to the end of the session.  Hi demonstrated good  understanding of the education provided.     See EMR under Patient Instructions for exercises provided 8/10/2020.    Assessment     Hi tolerated first tx with dec pain. HEP given. Pt will progress as tolerated to return to workout activities.    Hi is progressing well towards his goals.   Pt prognosis is Excellent.     Pt will continue to benefit from skilled outpatient physical therapy to address the deficits listed in the problem list box on initial evaluation, provide pt/family education and to maximize pt's level of independence in the home and community environment.     Pt's spiritual, cultural and educational needs considered and pt agreeable to plan of care and goals.    Anticipated barriers to physical therapy: none    Goals:   Short Term Goals: 3 weeks, progressing  -Improve R shoulder AROM WNL and equal or better to L shoulder.  -Pt able to interact with his young children under the age of 5 without difficulty or pain limitations.  -Pt able to perform all behind the back activities.     Long Term Goals: 6 weeks, progressing   -Pt able to perform health and fitness: push ups, and so on without pain limitations.  -R shoulder strength grossly 5/5 for the above activities.    Plan     Plan of care Certification: 8/4/2020 to  9/18/2020.     Outpatient Physical Therapy 2 times weekly for 6 weeks to include the following interventions: Electrical Stimulation IFC, Manual Therapy, Moist Heat/ Ice, Patient Education, Self Care, Therapeutic Activites and Therapeutic Exercise.        Divina Bernard, PT

## 2020-08-12 ENCOUNTER — CLINICAL SUPPORT (OUTPATIENT)
Dept: REHABILITATION | Facility: HOSPITAL | Age: 45
End: 2020-08-12
Attending: PHYSICIAN ASSISTANT
Payer: OTHER GOVERNMENT

## 2020-08-12 DIAGNOSIS — R29.898 IMPAIRED STRENGTH OF SHOULDER MUSCLES: ICD-10-CM

## 2020-08-12 DIAGNOSIS — M79.2 NERVE PAIN: ICD-10-CM

## 2020-08-12 DIAGNOSIS — M25.611 DECREASED RIGHT SHOULDER RANGE OF MOTION: Primary | ICD-10-CM

## 2020-08-12 PROCEDURE — 97112 NEUROMUSCULAR REEDUCATION: CPT | Mod: PO | Performed by: PHYSICAL THERAPIST

## 2020-08-12 PROCEDURE — 97140 MANUAL THERAPY 1/> REGIONS: CPT | Mod: PO | Performed by: PHYSICAL THERAPIST

## 2020-08-12 PROCEDURE — 97110 THERAPEUTIC EXERCISES: CPT | Mod: PO | Performed by: PHYSICAL THERAPIST

## 2020-08-12 NOTE — PROGRESS NOTES
Physical Therapy Daily Treatment Note     Name: Hi Salmeron  Clinic Number: [id number]    Therapy Diagnosis:   Encounter Diagnoses   Name Primary?    Decreased right shoulder range of motion Yes    Impaired strength of shoulder muscles     Nerve pain      Physician: Irma Cardoso PA-C    Visit Date: 8/12/2020  Physician Orders: PT Eval and Treat   Medical Diagnosis from Referral: M75.81 (ICD-10-CM) - Rotator cuff tendonitis, right G56.21 (ICD-10-CM) - Cubital tunnel syndrome on right   Evaluation Date: 8/4/2020  Authorization Period Expiration: 08/03/2021   Plan of Care Expiration: 9/18/2020  Visit # / Visits authorized: 3/ 16    Time In: 703  Time Out: 750  Total Billable Time: 40 minutes    Precautions: Standard    Subjective     Pt reports: improving quality, intensity, and frequency. Duration of pain is about the same. Elbow may be slightly improving.  He pending compliant with home exercise program. Home program not able to be given at [facility] due to multiple joint exam.  Response to previous treatment: no adverse effect  Functional change: too early    Pain: 2/10 pre tx and 0/10 post tx  Location: right elbow and shoulder      Objective     Hi received therapeutic exercises to develop strength, ROM, flexibility and posture for 27 minutes including:    UBE 3'3'' (subjective collected)  Supine AAROM flex and abd, 3# x20 each  Supine shoulder protraction 3# x20  Supine ABCs 1x  PROM and stretching R shoulder    Soon: IR at 90 deg, protraction on table, possible table walk with band    Hi received the following manual therapy techniques: Joint mobilizations, Manual traction and Soft tissue Mobilization were applied to the: R elbow and shoulder for 10 minutes, including:  IASTM with tools to R med elbow (triceps and flexor groups)  Gentle GHJ distraction with oscillations  KT tape applied to med ulnar nerve area    Hi participated in neuromuscular re-education activities to improve: Proprioception and neural  tensionfor 08 minutes. The following activities were included:  Ulnar Nerve glide x10  WS front-back, and side to side x10    Home Exercises Provided and Patient Education Provided     Education provided:   - HEP  - KT tape    Written Home Exercises Provided: Patient instructed to cont prior HEP.  Exercises were reviewed and Hi was able to demonstrate them prior to the end of the session.  Hi demonstrated good  understanding of the education provided.     See EMR under Patient Instructions for exercises provided 8/10/2020.    Assessment     Hi continues progressing with decreased pain; however, he continues with unexpected pain that stuns him at times during end range motion. Pt will progress as tolerated to return to workout activities.    Hi is progressing well towards his goals.   Pt prognosis is Excellent.     Pt will continue to benefit from skilled outpatient physical therapy to address the deficits listed in the problem list box on initial evaluation, provide pt/family education and to maximize pt's level of independence in the home and community environment.     Pt's spiritual, cultural and educational needs considered and pt agreeable to plan of care and goals.    Anticipated barriers to physical therapy: none    Goals:   Short Term Goals: 3 weeks, progressing  -Improve R shoulder AROM WNL and equal or better to L shoulder.  -Pt able to interact with his young children under the age of 5 without difficulty or pain limitations.  -Pt able to perform all behind the back activities.     Long Term Goals: 6 weeks, progressing   -Pt able to perform health and fitness: push ups, and so on without pain limitations.  -R shoulder strength grossly 5/5 for the above activities.    Plan     Plan of care Certification: 8/4/2020 to  9/18/2020.     Outpatient Physical Therapy 2 times weekly for 6 weeks to include the following interventions: Electrical Stimulation IFC, Manual Therapy, Moist Heat/ Ice, Patient  Education, Self Care, Therapeutic Activites and Therapeutic Exercise.        Divina Bernard, PT

## 2020-08-17 ENCOUNTER — CLINICAL SUPPORT (OUTPATIENT)
Dept: REHABILITATION | Facility: HOSPITAL | Age: 45
End: 2020-08-17
Attending: PHYSICIAN ASSISTANT
Payer: OTHER GOVERNMENT

## 2020-08-17 DIAGNOSIS — R29.898 IMPAIRED STRENGTH OF SHOULDER MUSCLES: ICD-10-CM

## 2020-08-17 DIAGNOSIS — M25.611 DECREASED RIGHT SHOULDER RANGE OF MOTION: Primary | ICD-10-CM

## 2020-08-17 DIAGNOSIS — M79.2 NERVE PAIN: ICD-10-CM

## 2020-08-17 PROCEDURE — 97140 MANUAL THERAPY 1/> REGIONS: CPT | Mod: PO | Performed by: PHYSICAL THERAPIST

## 2020-08-17 PROCEDURE — 97110 THERAPEUTIC EXERCISES: CPT | Mod: PO | Performed by: PHYSICAL THERAPIST

## 2020-08-17 NOTE — PROGRESS NOTES
"  Physical Therapy Daily Treatment Note     Name: Hi Salmeron  Clinic Number: [id number]    Therapy Diagnosis:   Encounter Diagnoses   Name Primary?    Decreased right shoulder range of motion Yes    Impaired strength of shoulder muscles     Nerve pain      Physician: Irma Cardoso PA-C    Visit Date: 8/17/2020  Physician Orders: PT Eval and Treat   Medical Diagnosis from Referral: M75.81 (ICD-10-CM) - Rotator cuff tendonitis, right G56.21 (ICD-10-CM) - Cubital tunnel syndrome on right   Evaluation Date: 8/4/2020  Authorization Period Expiration: 08/03/2021   Plan of Care Expiration: 9/18/2020  Visit # / Visits authorized: 4/ 16    Time In: 702  Time Out: 750  Total Billable Time: 38 minutes    Precautions: Standard    Subjective     Pt reports: improving quality, intensity, and frequency. Still with "popping" but not as painful.  He pending compliant with home exercise program. Home program not able to be given at al due to multiple joint exam.  Response to previous treatment: no adverse effect  Functional change: too early    Pain: 2/10 pre tx and 0/10 post tx  Location: right elbow and shoulder      Objective     Hi received therapeutic exercises to develop strength, ROM, flexibility and posture for 30 minutes including:    UBE 3'3'' (subjective collected)  Supine AAROM flex and abd, 3# x20 each  Supine shoulder protraction 3# x20  Supine ABCs 1x  PROM and stretching R shoulder  Plank on elbows protraction 2x10  RS in supine 3x30" 90 deg    Soon: IR stretch, IR at 90 deg, possible table walk with band    Hi received the following manual therapy techniques: Joint mobilizations, Manual traction and Soft tissue Mobilization were applied to the: R elbow and shoulder for 10 minutes, including:  IASTM with tools to R med elbow (triceps and flexor groups)  Gentle GHJ distraction with oscillations  KT tape applied to med ulnar nerve area    Hi participated in neuromuscular re-education activities to improve: " Proprioception and neural tensionfor 05 minutes. The following activities were included:  Ulnar Nerve glide x10-np  WS front-back, and side to side x10    Home Exercises Provided and Patient Education Provided     Education provided:   - HEP  - KT tape    Written Home Exercises Provided: Patient instructed to cont prior HEP.  Exercises were reviewed and Hi was able to demonstrate them prior to the end of the session.  Hi demonstrated good  understanding of the education provided.     See EMR under Patient Instructions for exercises provided 8/10/2020.    Assessment     Hi continues progressing with decreased pain. He continues to need stabilization ex to dec the abnormal movement occurring. Pt will progress as tolerated to return to workout activities.    Hi is progressing well towards his goals.   Pt prognosis is Excellent.     Pt will continue to benefit from skilled outpatient physical therapy to address the deficits listed in the problem list box on initial evaluation, provide pt/family education and to maximize pt's level of independence in the home and community environment.     Pt's spiritual, cultural and educational needs considered and pt agreeable to plan of care and goals.    Anticipated barriers to physical therapy: none    Goals:   Short Term Goals: 3 weeks, progressing  -Improve R shoulder AROM WNL and equal or better to L shoulder.  -Pt able to interact with his young children under the age of 5 without difficulty or pain limitations.  -Pt able to perform all behind the back activities.     Long Term Goals: 6 weeks, progressing   -Pt able to perform health and fitness: push ups, and so on without pain limitations.  -R shoulder strength grossly 5/5 for the above activities.    Plan     Plan of care Certification: 8/4/2020 to  9/18/2020.     Outpatient Physical Therapy 2 times weekly for 6 weeks to include the following interventions: Electrical Stimulation IFC, Manual Therapy, Moist Heat/ Ice,  Patient Education, Self Care, Therapeutic Activites and Therapeutic Exercise.        Divina Bernard, PT

## 2020-08-19 ENCOUNTER — CLINICAL SUPPORT (OUTPATIENT)
Dept: REHABILITATION | Facility: HOSPITAL | Age: 45
End: 2020-08-19
Attending: PHYSICIAN ASSISTANT
Payer: OTHER GOVERNMENT

## 2020-08-19 DIAGNOSIS — R29.898 IMPAIRED STRENGTH OF SHOULDER MUSCLES: ICD-10-CM

## 2020-08-19 DIAGNOSIS — M79.2 NERVE PAIN: ICD-10-CM

## 2020-08-19 DIAGNOSIS — M25.611 DECREASED RIGHT SHOULDER RANGE OF MOTION: Primary | ICD-10-CM

## 2020-08-19 PROCEDURE — 97140 MANUAL THERAPY 1/> REGIONS: CPT | Mod: PO | Performed by: PHYSICAL THERAPIST

## 2020-08-19 PROCEDURE — 97110 THERAPEUTIC EXERCISES: CPT | Mod: PO | Performed by: PHYSICAL THERAPIST

## 2020-08-19 NOTE — PROGRESS NOTES
"  Physical Therapy Daily Treatment Note     Name: Hi Salmeron  Clinic Number: [id number]    Therapy Diagnosis:   Encounter Diagnoses   Name Primary?    Decreased right shoulder range of motion Yes    Impaired strength of shoulder muscles     Nerve pain      Physician: Irma Cardoso PA-C    Visit Date: 8/19/2020  Physician Orders: PT Eval and Treat   Medical Diagnosis from Referral: M75.81 (ICD-10-CM) - Rotator cuff tendonitis, right G56.21 (ICD-10-CM) - Cubital tunnel syndrome on right   Evaluation Date: 8/4/2020  Authorization Period Expiration: 08/03/2021   Plan of Care Expiration: 9/18/2020  Visit # / Visits authorized: 5/ 16    Time In: 700  Time Out: 730  Total Billable Time: 25 minutes    Precautions: Standard    Subjective     Pt reports: inc pain with supine lying with arms over head. Requested to leave early for [employer].  He was compliant with home exercise program.  Response to previous treatment: no adverse effect  Functional change: too early    Pain: 3/10 pre tx and 2/10 post tx  Location: right elbow and shoulder      Objective     Hi received therapeutic exercises to develop strength, ROM, flexibility and posture for 19 minutes including:    UBE 2'2'' (subjective collected)  Supine AAROM flex and abd, 3# x20 each  Supine shoulder protraction 3# x20-np  Supine ABCs 1x-np  PROM and stretching R shoulder-np  Plank on elbows protraction 2x10  RS in supine 3x30" 90 deg-np  Table walk yellow band 3 laps  IR stretch with strap 2x30"    Soon: IR at 90 deg    Hi received the following manual therapy techniques: Joint mobilizations, Manual traction and Soft tissue Mobilization were applied to the: R elbow and shoulder for 10 minutes, including:  IASTM with tools to R med elbow (triceps and flexor groups)  KT tape applied to med ulnar nerve area    Hi participated in neuromuscular re-education activities to improve: Proprioception and neural tensionfor 00 minutes. The following activities were " included:  Ulnar Nerve glide x10-np  WS front-back, and side to side x10    Home Exercises Provided and Patient Education Provided     Education provided:   - HEP  - KT tape  - avoid sleeping arms over head, sleep s/l with shoulder 90/90 deg optimal    Written Home Exercises Provided: Patient instructed to cont prior HEP.  Exercises were reviewed and Hi was able to demonstrate them prior to the end of the session.  Hi demonstrated good  understanding of the education provided.     See EMR under Patient Instructions for exercises provided 8/10/2020.    Assessment     Hi continues progressing with decreased pain. He continues to need stabilization ex to dec the abnormal movement occurring. Education given to avoid impingement position of shoulder. Pt will progress as tolerated to return to workout activities.    Hi is progressing well towards his goals.   Pt prognosis is Excellent.     Pt will continue to benefit from skilled outpatient physical therapy to address the deficits listed in the problem list box on initial evaluation, provide pt/family education and to maximize pt's level of independence in the home and community environment.     Pt's spiritual, cultural and educational needs considered and pt agreeable to plan of care and goals.    Anticipated barriers to physical therapy: none    Goals:   Short Term Goals: 3 weeks, progressing  -Improve R shoulder AROM WNL and equal or better to L shoulder.  -Pt able to interact with his young children under the age of 5 without difficulty or pain limitations.  -Pt able to perform all behind the back activities.     Long Term Goals: 6 weeks, progressing   -Pt able to perform health and fitness: push ups, and so on without pain limitations.  -R shoulder strength grossly 5/5 for the above activities.    Plan     Plan of care Certification: 8/4/2020 to  9/18/2020.     Outpatient Physical Therapy 2 times weekly for 6 weeks to include the following interventions:  Electrical Stimulation IFC, Manual Therapy, Moist Heat/ Ice, Patient Education, Self Care, Therapeutic Activites and Therapeutic Exercise.        Divina Bernard, PT

## 2020-08-24 ENCOUNTER — CLINICAL SUPPORT (OUTPATIENT)
Dept: REHABILITATION | Facility: HOSPITAL | Age: 45
End: 2020-08-24
Attending: PHYSICIAN ASSISTANT
Payer: OTHER GOVERNMENT

## 2020-08-24 DIAGNOSIS — M79.2 NERVE PAIN: ICD-10-CM

## 2020-08-24 DIAGNOSIS — M25.611 DECREASED RIGHT SHOULDER RANGE OF MOTION: Primary | ICD-10-CM

## 2020-08-24 DIAGNOSIS — R29.898 IMPAIRED STRENGTH OF SHOULDER MUSCLES: ICD-10-CM

## 2020-08-24 PROCEDURE — 97110 THERAPEUTIC EXERCISES: CPT | Mod: PO | Performed by: PHYSICAL THERAPIST

## 2020-08-24 NOTE — PROGRESS NOTES
"  Physical Therapy Daily Treatment Note     Name: Hi Salmeron  Clinic Number: [id number]    Therapy Diagnosis:   Encounter Diagnoses   Name Primary?    Decreased right shoulder range of motion Yes    Impaired strength of shoulder muscles     Nerve pain      Physician: Irma Cardoso PA-C    Visit Date: 8/24/2020  Physician Orders: PT Eval and Treat   Medical Diagnosis from Referral: M75.81 (ICD-10-CM) - Rotator cuff tendonitis, right G56.21 (ICD-10-CM) - Cubital tunnel syndrome on right   Evaluation Date: 8/4/2020  Authorization Period Expiration: 08/03/2021   Plan of Care Expiration: 9/18/2020  Visit # / Visits authorized: 7/ 16    Time In: 702  Time Out: 745  Total Billable Time: 40 minutes    Precautions: Standard    Subjective     Pt reports: worse popping with pain. Unable to perform HEP last few days. Waking him up during sleep.  He was partially compliant with home exercise program.  Response to previous treatment: no adverse effect  Functional change: improved R elbow pain, improved R shoulder AROM abd    Pain: 4/10 pre tx and 2/10 post tx  Location: right elbow and shoulder      Objective     Hi received therapeutic exercises to develop strength, ROM, flexibility and posture for 40 minutes including:    UBE 2'2'' (subjective collected)  PROM and stretching R shoulder-np  Plank on elbows protraction 2x10  Table walk yellow band 3 laps-np  IR stretch with strap 3x30"    Shoulder depression blue band 2x10  Rows black band 2x10  Shoulder ext black band 2x10    PROM IR stretch 3'    GHJ joint distraction with oscillations    Soon: IR at 90 deg    Continue in HEP:  Supine AAROM flex and abd, 3# x20 each  Supine shoulder protraction 3# x20  Supine ABCs 1x  RS in supine 3x30" 90 deg    Hi received the following manual therapy techniques: Joint mobilizations, Manual traction and Soft tissue Mobilization were applied to the: R elbow and shoulder for 00 minutes, including:    Hi participated in neuromuscular " "re-education activities to improve: Proprioception and neural tensionfor 00 minutes. The following activities were included:  Ulnar Nerve glide x10-np  WS front-back, and side to side x10    Home Exercises Provided and Patient Education Provided     Education provided:   - HEP  - blue band given    Written Home Exercises Provided: Patient instructed to cont prior HEP.  Exercises were reviewed and Hi was able to demonstrate them prior to the end of the session.  Hi demonstrated good  understanding of the education provided.     See EMR under Patient Instructions for exercises provided 8/10/2020.    Assessment     Hi continues progressing with R shoulder pain and "popping". Labral pathology is expected. Pos compression and rot test is noted today. IR is limited moderately. Pt will progress as tolerated to return to workout activities.    Hi is progressing well towards his goals.   Pt prognosis is Excellent.     Pt will continue to benefit from skilled outpatient physical therapy to address the deficits listed in the problem list box on initial evaluation, provide pt/family education and to maximize pt's level of independence in the home and community environment.     Pt's spiritual, cultural and educational needs considered and pt agreeable to plan of care and goals.    Anticipated barriers to physical therapy: none    Goals:   Short Term Goals: 3 weeks, progressing  -Improve R shoulder AROM WNL and equal or better to L shoulder.  -Pt able to interact with his young children under the age of 5 without difficulty or pain limitations.  -Pt able to perform all behind the back activities.     Long Term Goals: 6 weeks, progressing   -Pt able to perform health and fitness: push ups, and so on without pain limitations.  -R shoulder strength grossly 5/5 for the above activities.    Plan     Plan of care Certification: 8/4/2020 to  9/18/2020.     Outpatient Physical Therapy 2 times weekly for 6 weeks to include the " following interventions: Electrical Stimulation IFC, Manual Therapy, Moist Heat/ Ice, Patient Education, Self Care, Therapeutic Activites and Therapeutic Exercise.        Divina Bernard, PT

## 2020-08-26 ENCOUNTER — CLINICAL SUPPORT (OUTPATIENT)
Dept: REHABILITATION | Facility: HOSPITAL | Age: 45
End: 2020-08-26
Attending: PHYSICIAN ASSISTANT
Payer: OTHER GOVERNMENT

## 2020-08-26 DIAGNOSIS — M79.2 NERVE PAIN: ICD-10-CM

## 2020-08-26 DIAGNOSIS — M25.611 DECREASED RIGHT SHOULDER RANGE OF MOTION: Primary | ICD-10-CM

## 2020-08-26 DIAGNOSIS — R29.898 IMPAIRED STRENGTH OF SHOULDER MUSCLES: ICD-10-CM

## 2020-08-26 PROCEDURE — 97110 THERAPEUTIC EXERCISES: CPT | Mod: PO | Performed by: PHYSICAL THERAPIST

## 2020-08-26 NOTE — PROGRESS NOTES
"  Physical Therapy Daily Treatment Note     Name: Hi Salmeron  Clinic Number: [id number]    Therapy Diagnosis:   Encounter Diagnoses   Name Primary?    Decreased right shoulder range of motion Yes    Impaired strength of shoulder muscles     Nerve pain      Physician: Irma Cardoso PA-C    Visit Date: 8/26/2020  Physician Orders: PT Eval and Treat   Medical Diagnosis from Referral: M75.81 (ICD-10-CM) - Rotator cuff tendonitis, right G56.21 (ICD-10-CM) - Cubital tunnel syndrome on right   Evaluation Date: 8/4/2020  Authorization Period Expiration: 08/03/2021   Plan of Care Expiration: 9/18/2020  Visit # / Visits authorized: 8/ 16    Time In: 702  Time Out: 745  Total Billable Time: 40 minutes    Precautions: Standard    Subjective     Pt reports: elbow continues well. Shoulder continues painful and limited. He's been in pain to do HEP.  He was partially compliant with home exercise program.  Response to previous treatment: no adverse effect  Functional change: improved R elbow pain, improved R shoulder AROM abd    Pain: 4/10 pre tx and 2/10 post tx  Location: right elbow and shoulder      Objective     Hi received therapeutic exercises to develop strength, ROM, flexibility and posture for 40 minutes including:    UBE 3'3 (subjective collected)  PROM and stretching R shoulder-np  Plank on elbows protraction 2x10  Table walk yellow band 3 laps  IR stretch with strap 3x30"    Shoulder depression black band 3x10  Rows black band 3x10  Shoulder ext black band 3x10  Lat pull (closed elbows) blue 3x10  Shoulder IR at side blue band 3x10  Shoulder ER at side green band 3x10    Plank on BOSU 3x15"  Plank on BOSU with ball trap 2x5    Soon: IR at 90 deg    Home Exercises Provided and Patient Education Provided     Education provided:   - HEP    Written Home Exercises Provided: Patient instructed to cont prior HEP.  Exercises were reviewed and Hi was able to demonstrate them prior to the end of the session.  Hi " "demonstrated good  understanding of the education provided.     See EMR under Patient Instructions for exercises provided 8/10/2020.    Assessment     Hi continues progressing with R shoulder pain and "popping". Labral pathology is expected. Pos compression and rot test in the past. IR is limited moderately. ER and ant GHJ glide is painful. Pt will progress as tolerated to return to workout activities.    Hi is progressing well towards his goals.   Pt prognosis is Excellent.     Pt will continue to benefit from skilled outpatient physical therapy to address the deficits listed in the problem list box on initial evaluation, provide pt/family education and to maximize pt's level of independence in the home and community environment.     Pt's spiritual, cultural and educational needs considered and pt agreeable to plan of care and goals.    Anticipated barriers to physical therapy: none    Goals:   Short Term Goals: 3 weeks, progressing  -Improve R shoulder AROM WNL and equal or better to L shoulder.  -Pt able to interact with his young children under the age of 5 without difficulty or pain limitations.  -Pt able to perform all behind the back activities.     Long Term Goals: 6 weeks, progressing   -Pt able to perform health and fitness: push ups, and so on without pain limitations.  -R shoulder strength grossly 5/5 for the above activities.    Plan     Plan of care Certification: 8/4/2020 to  9/18/2020.     Outpatient Physical Therapy 2 times weekly for 6 weeks to include the following interventions: Electrical Stimulation IFC, Manual Therapy, Moist Heat/ Ice, Patient Education, Self Care, Therapeutic Activites and Therapeutic Exercise.        Divina Bernard, PT    "

## 2020-09-11 ENCOUNTER — TELEPHONE (OUTPATIENT)
Dept: REHABILITATION | Facility: HOSPITAL | Age: 45
End: 2020-09-11

## 2020-09-14 ENCOUNTER — DOCUMENTATION ONLY (OUTPATIENT)
Dept: REHABILITATION | Facility: HOSPITAL | Age: 45
End: 2020-09-14

## 2020-09-14 NOTE — PROGRESS NOTES
LM for returned call. Second attempt for update. He may be unable to respond due to [employer] duties.    Divina Bernard, PT, DPT, MTC

## 2020-10-01 ENCOUNTER — DOCUMENTATION ONLY (OUTPATIENT)
Dept: REHABILITATION | Facility: HOSPITAL | Age: 45
End: 2020-10-01

## 2020-10-01 PROBLEM — R29.898 IMPAIRED STRENGTH OF SHOULDER MUSCLES: Status: RESOLVED | Noted: 2020-08-10 | Resolved: 2020-10-01

## 2020-10-01 PROBLEM — M79.2 NERVE PAIN: Status: RESOLVED | Noted: 2020-08-10 | Resolved: 2020-10-01

## 2020-10-01 PROBLEM — M25.611 DECREASED RIGHT SHOULDER RANGE OF MOTION: Status: RESOLVED | Noted: 2020-08-10 | Resolved: 2020-10-01

## 2020-10-01 NOTE — PROGRESS NOTES
Outpatient Therapy Discharge Summary     Name: Hi Salmeron  Clinic Number: [id number]    Therapy Diagnosis:        Encounter Diagnoses   Name Primary?    Decreased right shoulder range of motion Yes    Impaired strength of shoulder muscles      Nerve pain       Physician: Irma Cardoso PA-C    Physician Orders: PT Eval and Treat   Medical Diagnosis from Referral: M75.81 (ICD-10-CM) - Rotator cuff tendonitis, right G56.21 (ICD-10-CM) - Cubital tunnel syndrome on right   Evaluation Date: 8/4/2020    Date of Last visit: 8/26/2020  Total Visits Received: 8  Cancelled Visits: 0  No Show Visits: 0    Assessment    Goals:   Short Term Goals: 3 weeks, progressing  -Improve R shoulder AROM WNL and equal or better to L shoulder.  -Pt able to interact with his young children under the age of 5 without difficulty or pain limitations.  -Pt able to perform all behind the back activities.     Long Term Goals: 6 weeks, progressing   -Pt able to perform health and fitness: push ups, and so on without pain limitations.  -R shoulder strength grossly 5/5 for the above activities.    Pt was called to work in [employer]. He has not returned. We tried to reach each other 2x. Poor reception.    Discharge reason: Patient has not attended therapy since 8/26/2020.    Plan   This patient is discharged from Physical Therapy.

## 2020-10-06 ENCOUNTER — PATIENT MESSAGE (OUTPATIENT)
Dept: FAMILY MEDICINE | Facility: CLINIC | Age: 45
End: 2020-10-06

## 2020-10-06 DIAGNOSIS — M25.511 RIGHT SHOULDER PAIN, UNSPECIFIED CHRONICITY: Primary | ICD-10-CM

## 2020-10-06 DIAGNOSIS — M75.81 ROTATOR CUFF TENDONITIS, RIGHT: ICD-10-CM

## 2020-10-07 ENCOUNTER — PATIENT MESSAGE (OUTPATIENT)
Dept: ORTHOPEDICS | Facility: CLINIC | Age: 45
End: 2020-10-07

## 2020-10-07 ENCOUNTER — OFFICE VISIT (OUTPATIENT)
Dept: ORTHOPEDICS | Facility: CLINIC | Age: 45
End: 2020-10-07
Payer: OTHER GOVERNMENT

## 2020-10-07 ENCOUNTER — TELEPHONE (OUTPATIENT)
Dept: ORTHOPEDICS | Facility: CLINIC | Age: 45
End: 2020-10-07

## 2020-10-07 VITALS
DIASTOLIC BLOOD PRESSURE: 86 MMHG | SYSTOLIC BLOOD PRESSURE: 124 MMHG | HEART RATE: 86 BPM | BODY MASS INDEX: 28.7 KG/M2 | HEIGHT: 71 IN | WEIGHT: 205 LBS

## 2020-10-07 DIAGNOSIS — M75.21 BICEPS TENDINITIS OF RIGHT UPPER EXTREMITY: Primary | ICD-10-CM

## 2020-10-07 DIAGNOSIS — M25.511 RIGHT SHOULDER PAIN, UNSPECIFIED CHRONICITY: ICD-10-CM

## 2020-10-07 DIAGNOSIS — M75.81 ROTATOR CUFF TENDONITIS, RIGHT: ICD-10-CM

## 2020-10-07 DIAGNOSIS — M25.511 RIGHT SHOULDER PAIN, UNSPECIFIED CHRONICITY: Primary | ICD-10-CM

## 2020-10-07 PROCEDURE — 99214 OFFICE O/P EST MOD 30 MIN: CPT | Mod: PBBFAC | Performed by: PHYSICAL MEDICINE & REHABILITATION

## 2020-10-07 PROCEDURE — 99214 PR OFFICE/OUTPT VISIT, EST, LEVL IV, 30-39 MIN: ICD-10-PCS | Mod: S$PBB,25,, | Performed by: PHYSICAL MEDICINE & REHABILITATION

## 2020-10-07 PROCEDURE — 99214 OFFICE O/P EST MOD 30 MIN: CPT | Mod: S$PBB,25,, | Performed by: PHYSICAL MEDICINE & REHABILITATION

## 2020-10-07 PROCEDURE — 20550 NJX 1 TENDON SHEATH/LIGAMENT: CPT | Mod: PBBFAC | Performed by: PHYSICAL MEDICINE & REHABILITATION

## 2020-10-07 PROCEDURE — 99999 PR PBB SHADOW E&M-EST. PATIENT-LVL IV: ICD-10-PCS | Mod: PBBFAC,,, | Performed by: PHYSICAL MEDICINE & REHABILITATION

## 2020-10-07 PROCEDURE — 20550 TENDON SHEATH: ICD-10-PCS | Mod: S$PBB,RT,, | Performed by: PHYSICAL MEDICINE & REHABILITATION

## 2020-10-07 PROCEDURE — 99999 PR PBB SHADOW E&M-EST. PATIENT-LVL IV: CPT | Mod: PBBFAC,,, | Performed by: PHYSICAL MEDICINE & REHABILITATION

## 2020-10-07 RX ORDER — METHYLPREDNISOLONE ACETATE 40 MG/ML
40 INJECTION, SUSPENSION INTRA-ARTICULAR; INTRALESIONAL; INTRAMUSCULAR; SOFT TISSUE
Status: DISCONTINUED | OUTPATIENT
Start: 2020-10-07 | End: 2020-10-07 | Stop reason: HOSPADM

## 2020-10-07 RX ADMIN — METHYLPREDNISOLONE ACETATE 40 MG: 40 INJECTION, SUSPENSION INTRA-ARTICULAR; INTRALESIONAL; INTRAMUSCULAR; SOFT TISSUE at 08:10

## 2020-10-07 NOTE — PATIENT INSTRUCTIONS
Understanding Biceps Tendonitis    A tendon is a strong band of tissue that connects muscle to bone. The biceps muscle is in the front of the upper arm. It helps with movements such as bending the elbow or raising the arm. The upper end of the biceps muscle is called the proximal end. It has two tendons called the long head and the short head. These tendons attach the muscle to the bones in the shoulder. Biceps tendonitis occurs when either of these tendons is irritated or red and swollen (inflamed). Most cases involve the long head.  Causes of biceps tendonitis  Causes can include:  · Wear and tear of the tendon from aging or normal use over time  · Overuse of the tendon from sports or work activities, especially those that involve repeated overhead movements  · Injury to the tendon from a fall or other accident  · Other problems in the shoulder, such as shoulder impingement or a rotator cuff tear  Symptoms of biceps tendonitis  Common symptoms include:  · Pain in the front of the shoulder that may also travel down the arm. The pain may be worse with activity and at night.  · Swelling in the shoulder  · Clicking or catching sensation when using the arm and shoulder  · Trouble moving the arm and shoulder  Treating biceps tendonitis  Treatment for biceps tendonitis may include:  · Resting the arm and shoulder. This involves limiting certain movements, such as reaching above the head or raising the arm. These can slow healing and make symptoms worse. You may also need to limit certain sports and types of work for a time.  · Cold therapy. This involves using items such as ice packs to help relieve symptoms. Cold can help reduce pain and swelling.  · Medicines. These help relieve pain and swelling. NSAIDs (nonsteroidal anti-inflammatory drugs) are the most common medicines used. Medicines may be prescribed or bought over-the-counter. They may be given as pills. Or they may be applied to the skin in the form of a gel,  cream, or patch.  · Injections of medicine into the injured area. These help relieve pain and swelling for a time.  · Physical therapy and exercises.  These help improve strength and range of motion in the arm and shoulder.  Possible complications  · If the tendon isnt given time to heal, symptoms may worsen. Also, the tendon may tear (rupture).  · If the tendon ruptures or doesnt get better with treatment, your healthcare provider may recommend surgery. This most often involves repairing and reattaching the tendon.     When to call your healthcare provider  Call your healthcare provider right away if you have any of these:  · Fever of 100.4°F (38°C) or higher, or as directed  · Symptoms that dont get better with treatment, or get worse  · Weakness or instability in the arm or shoulder  · Sudden sharp pain, bruising, swelling, popping or snapping sensation, or bulge in the upper arm or shoulder  · New symptoms   Date Last Reviewed: 3/10/2016  © 0824-0112 [employer]. [address] [place] [address]. All rights reserved. This information is not intended as a substitute for professional medical care. Always follow your healthcare professional's instructions.        Understanding Shoulder Impingement Syndrome    Shoulder impingement syndrome is a problem with the shoulder joint. It occurs when certain parts within the joint swell and are pinched. This can cause nagging pain and problems with moving the arm.  What causes shoulder impingement syndrome?  It is possible to develop impingement after years of normal shoulder use. But in most cases the condition occurs because of repeated overhead movements. These include such things as stocking shelves, painting, swimming, and throwing. These movements can irritate parts of the shoulder, leading to swelling. Swollen parts of the shoulder take up more room, making the joint space smaller. Some parts that may be involved include:  · A sac of fluid  (bursa) that cushions the shoulder joint.  When the bursa is irritated, it may lead to a condition called bursitis. This is when the bursa swells with fluid, filling and squeezing the joint space.  · Fibrous tissues (tendons) that connect muscle to bone. When tendons are irritated, they may become swollen. This is called tendonitis.  · The end (acromion) of the shoulder blade. This bone may be flat or hooked. If the acromion is hooked, the joint space may be smaller than normal. Growths (bone spurs) on the acromion can also narrow the joint space. Acromion problems dont often cause impingement. But they can make it worse.  Symptoms of shoulder impingement syndrome  Symptoms include pain, pinching, or stiffness in your shoulder. Pain often comes with movement, particularly reaching overhead or backward. It may also be felt when the shoulder is at rest. Pain at night during sleep is common.  Treatment for shoulder impingement syndrome  Treatment will depend on the cause of the problem, how bad the problem is, and if other parts of the shoulder are damaged. Treatment may include the following:  · Active rest. This allows the shoulder to heal. It means using the arm and shoulder, but avoiding activities that cause pain. These likely include reaching overhead or sleeping on your shoulder.  · Cold packs. These help reduce swelling and relieve pain.  · Prescription or over-the-counter pain medicines. These help relieve pain and swelling.  · Arm and shoulder exercises. These help keep your shoulder joint mobile as it heals. They also help improve muscle strength around the joint.  · Shots of medicine into the joint. This can help reduce swelling and pain for a short time.  If other measures dont work to relieve symptoms, you may need surgery. This opens up space in the joint to allow pain-free motion.  Possible complications of shoulder impingement syndrome  It might be tempting to stop using your shoulder completely to  avoid pain. But doing so may lead to a condition called frozen shoulder. To help prevent this, follow instructions you are given for active rest and for doing exercises to help your shoulder heal.  When to call your healthcare provider  Call your healthcare provider right away if you have any of these:  · Fever of 100.4°F (38°C) or higher, or as directed  · Symptoms that dont get better, or get worse  · New symptoms   Date Last Reviewed: 3/10/2016  © 7174-4826 [employer]. [address] [place] [address]. All rights reserved. This information is not intended as a substitute for professional medical care. Always follow your healthcare professional's instructions.        Nonsurgical Treatment Options for Shoulder Impingement    Rest is key to healing your shoulder. If an activity hurts, dont do it. Otherwise, you may prevent healing and increase pain. Your shoulder needs active rest. This means avoiding overhead movements and activities that cause pain. But DO NOT stop using your shoulder completely. This can cause it to stiffen or freeze. In addition to rest, impingement can be treated a number of ways. Your healthcare provider can help you find which of these is best for you.  A physical therapist can also help you with exercises specific for your condition.  ? Ice  Ice reduces inflammation and relieves pain. Apply an ice pack for about 15 minutes, 3 times a day. You can also use a bag of frozen peas instead of an ice pack. A pillow placed under your arm may help make you more comfortable.  Note: Dont put the cold item directly on your skin. Place it on top of your shirt, or wrap it in a thin towel or washcloth.  ? Heat  Heat may soothe aching muscles, but it wont reduce inflammation. Use a heating pad or take a warm shower or bath. Do this for 15 minutes at a time.  Note: Avoid heat when pain is constant. Heat is best when used for warming up before an activity. You can also alternate  ice and heat.  ? Medicine  To relieve pain and inflammation, try over-the-counter pain relievers, such as acetaminophen or ibuprofen. Or, your healthcare provider may prescribe medicines. Ask how and when to take your medicine. Be sure to follow all instructions youre given.  ? Electrical stimulation  Electrical stimulation can help reduce pain and swelling. Your healthcare provider attaches small pads to your shoulder. A mild electric current then flows into your shoulder. You may feel tingling, but you should not feel pain.  ? Ultrasound  Ultrasound can help reduce pain. First a slick gel or medicated cream is applied to your shoulder. Then your healthcare provider places a small device over the area. The device uses sound waves to loosen shoulder tightness. This treatment should be pain-free.  ? Injection therapy  Injection therapy may be used to help diagnose your problem. It may also be used to reduce pain and inflammation. The injection typically includes two medicines. One is an anesthetic to numb the shoulder. The other is a steroid, such as cortisone, to help reduce painful swelling. It can take from a few hours to a couple of days before the injection helps. Talk to your healthcare provider about the possible risks and benefits of this therapy.  Date Last Reviewed: 10/14/2015  © 6327-9463 [employer]. [address] [place] [address], [place] [address]. All rights reserved. This information is not intended as a substitute for professional medical care. Always follow your healthcare professional's instructions.

## 2020-10-07 NOTE — PROGRESS NOTES
SPORTS MEDICINE / PM&R New Patient Visit :    Referring Physician: Irma Cardoso PA-C    Chief Complaint   Patient presents with    Right Shoulder - Pain       HPI: This is a 45 y.o.  male being seen in clinic today for evaluation of Pain of the Right Shoulder   The problem first began years ago but it has recently flared up and has caused him severe pain. He feels sharp, aching, stabbing, constant and pain with movement pain on his right shoulder. All pain is in front of shoulder. The symptoms are worsening. He has tried therapeutic exercise and OTC meds without improvement. He has tried therapy for a month without improvement. He states that it wakes him up at night. He has pain with overhead lifting and when he reaches in multiple directions and when he feels the pain, he has immediate loss of strength for a few seconds. 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 Notes he can't rest the shoulder with his job.    History obtained from patient.    Past family, medical, social, surgical history, and vital signs reviewed in chart.    Review of Systems   Constitutional: Negative for chills, fever and weight loss.   HENT: Negative for hearing loss and sore throat.    Eyes: Negative for blurred vision, photophobia and pain.   Respiratory: Negative for shortness of breath.    Cardiovascular: Negative for chest pain.   Gastrointestinal: Negative for abdominal pain.   Genitourinary: Negative for dysuria.   Musculoskeletal: Positive for joint pain.   Skin: Negative for rash.   Neurological: Negative for tingling and headaches.   Endo/Heme/Allergies: Does not bruise/bleed easily.   Psychiatric/Behavioral: Negative for depression.       General    Nursing note and vitals reviewed.  Constitutional: He is oriented to person, place, and time. He appears well-developed and well-nourished.   HENT:   Head: Normocephalic and atraumatic.   Eyes: Conjunctivae and EOM are  normal. Pupils are equal, round, and reactive to light.   Neck: Neck supple.   Cardiovascular: Intact distal pulses.    Pulmonary/Chest: Effort normal. No respiratory distress.   Abdominal: He exhibits no distension.   Neurological: He is alert and oriented to person, place, and time. He has normal reflexes.   Psychiatric: He has a normal mood and affect.         Right Shoulder Exam     Inspection/Observation   Swelling: absent  Scars: absent  Deformity: absent  Scapular Winging: absent  Scapular Dyskinesia: positive    Tenderness   The patient is tender to palpation of the greater tuberosity and biceps tendon.    Crepitus   The patient has crepitus of the greater tuberosity.    Range of Motion   Active abduction: normal   Passive abduction: normal   Extension: abnormal   Forward Flexion: normal   Adduction: normal  External Rotation 0 degrees: normal   Internal rotation 0 degrees:  T10 abnormal     Muscle Strength   The patient has normal right shoulder strength.    Tests & Signs   Apprehension: negative  Carter test: negative  Impingement: negative  Sulcus: absent  Rotator Cuff Painful Arc/Range: mild  Lift Off Sign: positive  Active Compression Test (Southington's Sign): negative  Speed's Test: positive  Jerk Test: negative    Other   Sensation: normal    Left Shoulder Exam   Left shoulder exam is normal.    Inspection/Observation   Scars: absent  Scapular Winging: absent  Scapular Dyskinesia: negative    Range of Motion   Active abduction: normal   Passive abduction: normal   Extension: normal   Forward Flexion: normal   Adduction: normal  External Rotation 0 degrees: normal   Internal rotation 0 degrees: normal     Muscle Strength   The patient has normal left shoulder strength.    Tests & Signs   Apprehension: negative  Carter test: negative  Impingement: negative  Sulcus: absent  Speed's Test: negative  Jerk Test: negative    Other   Sensation: normal       Muscle Strength   Right Upper Extremity   Shoulder  Abduction: 5/5   Shoulder Internal Rotation: 5/5   Shoulder External Rotation: 4/5   Supraspinatus: 5/5   Biceps: 4/5     Reflexes     Left Side  Biceps:  2+  Brachioradialis:  2+  Left Pandey's Sign:  Absent    Right Side   Biceps:  2+  Brachioradialis:  2+  Right Pandey's Sign:  absent    Vascular Exam     Right Pulses      Radial:                    2+      Left Pulses      Radial:                    2+      Capillary Refill  Right Hand: normal capillary refill  Left Hand: normal capillary refill      Narrative & Impression     EXAMINATION:  XR SHOULDER COMPLETE 2 OR MORE VIEWS RIGHT     CLINICAL HISTORY:  Pain in right shoulder     TECHNIQUE:  Two or three views of the right shoulder were performed.     COMPARISON:  None     FINDINGS:  Spurring is noted at the acromioclavicular joint.  No obvious fracture or dislocation is noted.     Impression:     See above        Electronically signed by: Murray Ordoñez MD  Date:                                            08/03/2020  Time:                                           13:36       IMPRESSION/PLAN: This is a 45 y.o.  male with:    Biceps tendinitis of right upper extremity  -     Tendon Sheath    Right shoulder pain, unspecified chronicity  -     Ambulatory referral/consult to Orthopedics  -     Tendon Sheath    Rotator cuff tendonitis, right  -     Ambulatory referral/consult to Orthopedics        The findings were discussed with Hi in detail. This seems most consistent with biceps tendonitis with a component of impingement syndrome. Since he's had no improvement with PT and is going on active duty soon, he was interested in injection to get some relief. We reviewed risks vs benefits and proceeded with tendon sheath injection as noted. He'll continue oral meds as well. He'll take it easy on this shoulder for a week, then resume PT and strengthening exercises as tolerated. We'll get a shoulder MRI as well to eval for RTC or biceps tear. He'll need to schedule at  a time that works for him.  He was provided with this plan in writing. All of his questions were answered. I'll call with MRI results and discuss how to proceed.     Talia Garcia M.D.  Sports Medicine

## 2020-10-07 NOTE — PROCEDURES
Tendon Sheath    Date/Time: 10/7/2020 8:00 AM  Performed by: Talia Garcia MD  Authorized by: Talia Garcia MD     Consent Done?:  Yes (Verbal)  Indications:  Pain and diagnostic evaluation  Site marked: the procedure site was marked    Timeout: prior to procedure the correct patient, procedure, and site was verified    Prep: patient was prepped and draped in usual sterile fashion      Local anesthesia used?: Yes    Local anesthetic:  Topical anesthetic and lidocaine 1% without epinephrine  Location:  Shoulder  Site:  R bicep tendon  Ultrasonic guidance for needle placement?: No    Needle size:  27 G  Approach:  Lateral  Medications:  40 mg methylPREDNISolone acetate 40 mg/mL  Patient tolerance:  Patient tolerated the procedure well with no immediate complications

## 2020-10-07 NOTE — LETTER
October 7, 2020      Irma Cardoso PA-C  [address] [facility] [address] [place] [address]           [facility]  [address] [facility] [address]  Phone: [phone]  Fax: [phone]          Patient: Hi Salmeron   MR Number: [id number]   YOB: 1975   Date of Visit: 10/7/2020       Dear Irma Cardoso:    Thank you for referring Hi Salmeron to me for evaluation. Attached you will find relevant portions of my assessment and plan of care.    If you have questions, please do not hesitate to call me. I look forward to following Hi Salmeron along with you.    Sincerely,    Talia Garcia MD    Enclosure  CC:  No Recipients    If you would like to receive this communication electronically, please contact [email or web address] or [phone] to request more information on [employer] access.    For providers and/or their staff who would like to refer a patient to [facility], please contact us through our one-stop-shop provider referral line, [facility], at [phone].    If you feel you have received this communication in error or would no longer like to receive these types of communications, please e-mail [email or web address]

## 2020-11-16 ENCOUNTER — OFFICE VISIT (OUTPATIENT)
Dept: URGENT CARE | Facility: CLINIC | Age: 45
End: 2020-11-16
Payer: OTHER GOVERNMENT

## 2020-11-16 VITALS
HEART RATE: 73 BPM | HEIGHT: 71 IN | RESPIRATION RATE: 16 BRPM | SYSTOLIC BLOOD PRESSURE: 120 MMHG | TEMPERATURE: 97 F | DIASTOLIC BLOOD PRESSURE: 73 MMHG | OXYGEN SATURATION: 95 % | BODY MASS INDEX: 28.7 KG/M2 | WEIGHT: 205 LBS

## 2020-11-16 DIAGNOSIS — T15.92XA FOREIGN BODY, EYE, LEFT, INITIAL ENCOUNTER: ICD-10-CM

## 2020-11-16 DIAGNOSIS — H01.004 BLEPHARITIS OF LEFT UPPER EYELID, UNSPECIFIED TYPE: Primary | ICD-10-CM

## 2020-11-16 PROCEDURE — 99214 OFFICE O/P EST MOD 30 MIN: CPT | Mod: S$GLB,,, | Performed by: NURSE PRACTITIONER

## 2020-11-16 PROCEDURE — 99214 PR OFFICE/OUTPT VISIT, EST, LEVL IV, 30-39 MIN: ICD-10-PCS | Mod: S$GLB,,, | Performed by: NURSE PRACTITIONER

## 2020-11-16 RX ORDER — AZITHROMYCIN 250 MG/1
TABLET, FILM COATED ORAL
Qty: 6 TABLET | Refills: 0 | Status: SHIPPED | OUTPATIENT
Start: 2020-11-16 | End: 2020-11-21

## 2020-11-16 RX ORDER — BACITRACIN 500 [USP'U]/G
OINTMENT OPHTHALMIC 3 TIMES DAILY
Qty: 3.5 G | Refills: 0 | Status: SHIPPED | OUTPATIENT
Start: 2020-11-16 | End: 2020-11-23

## 2020-11-16 NOTE — PROGRESS NOTES
"Subjective:       Patient ID: Hi Salmeron is a 45 y.o. male.    Vitals:  height is 5' 11" (1.803 m) and weight is 93 kg (205 lb). His temperature is 97.2 °F (36.2 °C). His blood pressure is 120/73 and his pulse is 73. His respiration is 16 and oxygen saturation is 95%.     Chief Complaint: No chief complaint on file.    Patient stated that he started feeling like he has had something in his left eye since Friday evening.  It felt better on Saturday, but then got worse yesterday.  It started with redness and swelling today and has a scratchy cutting feeling. It is also painful to touch.     Eye Problem   The left eye is affected. This is a new problem. The current episode started in the past 7 days (Friday). The problem occurs constantly. The problem has been gradually worsening. The injury mechanism is unknown. The pain is at a severity of 4/10. The pain is moderate. There is no known exposure to pink eye. He does not wear contacts. Associated symptoms include eye redness and a foreign body sensation. Pertinent negatives include no blurred vision, eye discharge, double vision, fever, itching, nausea, photophobia or vomiting. He has tried eye drops for the symptoms. The treatment provided no relief.       Constitution: Negative. Negative for chills and fever.   HENT: Negative.  Negative for congestion and sinus pain.    Neck: negative.   Cardiovascular: Negative.    Eyes: Positive for eye pain, eye redness and eyelid swelling. Negative for eye trauma, foreign body in eye, eye discharge, eye itching, photophobia, vision loss, double vision and blurred vision.   Respiratory: Negative.    Gastrointestinal: Negative.  Negative for nausea and vomiting.   Endocrine: negative.   Genitourinary: Negative.    Musculoskeletal: Negative.    Skin: Negative.  Negative for rash.   Allergic/Immunologic: Negative.  Negative for seasonal allergies and itching.   Neurological: Negative.  Negative for headaches.   Hematologic/Lymphatic: " Negative.    Psychiatric/Behavioral: Negative.        Objective:      Physical Exam   Constitutional: He is oriented to person, place, and time. He appears well-developed.   HENT:   Head: Normocephalic and atraumatic.   Ears:   Right Ear: External ear normal.   Left Ear: External ear normal.   Nose: Nose normal.   Mouth/Throat: Oropharynx is clear and moist.   Eyes: Pupils are equal, round, and reactive to light. Conjunctivae, EOM and lids are normal. Left eye exhibits no discharge and no hordeolum. No foreign body present in the left eye.      Comments: Left upper eyelid margin noted with moderate erythema and swelling, mild tenderness   Neck: Trachea normal, full passive range of motion without pain and phonation normal. Neck supple.   Musculoskeletal: Normal range of motion.   Neurological: He is alert and oriented to person, place, and time.   Skin: Skin is warm, dry and intact. Psychiatric: His speech is normal and behavior is normal. Judgment and thought content normal.   Nursing note and vitals reviewed.        Assessment:       1. Blepharitis of left upper eyelid, unspecified type    2. Foreign body, eye, left, initial encounter        Plan:         Blepharitis of left upper eyelid, unspecified type  -     bacitracin (AK-TRACIN) ophthalmic ointment; Place into the left eye 3 (three) times daily. Apply to upper left eyelid/eyelash margin as advised for 7 days  Dispense: 3.5 g; Refill: 0  -     azithromycin (Z-JOHNNY) 250 MG tablet; Take 2 tablets by mouth on day 1; Take 1 tablet by mouth on days 2-5  Dispense: 6 tablet; Refill: 0    Foreign body, eye, left, initial encounter  -     fluorescein-benoxinate 0.25-0.4% ophthalmic solution 2 drop         ___conjunctival injection, no hyphema, no chemosis, no obvious foreign body; eyelids everted. No hypopyon. No proptosis. Left upper eyelid margin, erythema/edema. No corneal infiltrate. Extraocular movements intact with no pain. No fluorescin uptake.    Home  care  Medicine: The healthcare provider may prescribe an antibiotic eye drops or ointment, artificial tears, and/or steroid eye drops. Follow all instructions for using these medicines. Use all medicines as directed. If you have pain, take medicine as advised by the healthcare provider.  · Wash your hands carefully with soap and warm water before and after caring for your eyes.  · Apply a warm compress or a warm, moist washcloth to the eyelids for 1 minute, 2 to 3 times a day, to loosen the crust. Then, wipe away scales or crust from the eyelids.  · After applying the warm compress, gently scrub the base of the eyelashes for almost 15 seconds per eyelid. To do this, close your eyes and use a moist eyelid cleansing wipe, clean washcloth, or cotton swab. Ask your healthcare provider about products (such as nonirritating baby shampoo) to use to help clean the eyelids.  · You may be instructed to gently massage your eyelids to help unblock the eyelid glands. Follow all instructions given by the healthcare provider.  · Unless told otherwise, on a regular basis, with eyes closed, clean your eyelids as directed by the healthcare provider. Blepharitis can be an ongoing problem.  · Do not wear eye makeup until the inflammation goes away, or as directed by your healthcare provider.  · Unless told otherwise, stop using contact lenses until you complete treatment for the condition.  · Wash your hands regularly to help prevent dirt and bacteria from coming in contact with your eyelid.

## 2020-11-16 NOTE — PATIENT INSTRUCTIONS
Blepharitis    Blepharitis is an inflammation of the eyelid. It results in swelling of the eyelids, and it is usually caused by a bacterial infection or a skin condition. Blepharitis is a common eye condition. There are two types. Anterior blepharitis occurs where the eyelashes are attached (outside front edge of the eye). Posterior blepharitis affects the inner edge of the eyelid that touches the eyeball.  In addition to swollen eyelids, symptoms of blepharitis can include thick, yellow, dandruff-like scales that stick to the eyelid. There may be oily patches on the eyelid. The eyelashes may be crusted (with dandruff-like scales) when you wake up from sleeping. The irritated area may itch. The eyelids may be red. The eyes can be red and burn or sting. The eyes may tear a lot, or be dry. You can become sensitive to light or have blurred vision. Symptoms of blepharitis can cause irritability.  Blepharitis is a chronic condition and difficult to cure. Even with successful treatment, recurrences are common. Good hygiene and home treatments (in the Home care section below) can improve your condition.  Causes  Causes of blepharitis may include:  · Problems with the oil glands in the eyelid (meibomian glands)  · Dandruff of the scalp and eyebrows (seborrheic dermatitis)  · Acne rosacea (a skin condition that causes redness of the face, and other symptoms)  · Eyelash mites (tiny organisms in the eyelash follicles)  · Allergic reactions to cosmetics or medicines  Home care  Medicine: The healthcare provider may prescribe an antibiotic eye drops or ointment, artificial tears, and/or steroid eye drops. Follow all instructions for using these medicines. Use all medicines as directed. If you have pain, take medicine as advised by the healthcare provider.  · Wash your hands carefully with soap and warm water before and after caring for your eyes.  · Apply a warm compress or a warm, moist washcloth to the eyelids for 1 minute,  2 to 3 times a day, to loosen the crust. Then, wipe away scales or crust from the eyelids.  · After applying the warm compress, gently scrub the base of the eyelashes for almost 15 seconds per eyelid. To do this, close your eyes and use a moist eyelid cleansing wipe, clean washcloth, or cotton swab. Ask your healthcare provider about products (such as nonirritating baby shampoo) to use to help clean the eyelids.  · You may be instructed to gently massage your eyelids to help unblock the eyelid glands. Follow all instructions given by the healthcare provider.  · Unless told otherwise, on a regular basis, with eyes closed, clean your eyelids as directed by the healthcare provider. Blepharitis can be an ongoing problem.  · Do not wear eye makeup until the inflammation goes away, or as directed by your healthcare provider.  · Unless told otherwise, stop using contact lenses until you complete treatment for the condition.  · Wash your hands regularly to help prevent dirt and bacteria from coming in contact with your eyelid.  Follow-up care  Follow up with your healthcare provider, or as advised. Your healthcare provider may refer you to an eye specialist (an optometrist or ophthalmologist) for further evaluation and treatment.  When to seek medical advice  Call your healthcare provider right away if any of these occur:  · Increase in redness of the white part of the eye  · Increase in swelling, redness, irritation, or pain of the eyelids  · Eye pain  · Change in vision (trouble seeing or blurring)  · Drainage (pus, blood) from the eyelid  · Fever of 100.4ºF (38ºC) or higher, or as directed by your healthcare provider  Date Last Reviewed: 10/9/2015  © 8142-0419 [employer]. [address] [place] [address], [place] [address]. All rights reserved. This information is not intended as a substitute for professional medical care. Always follow your healthcare professional's instructions.

## 2020-11-17 ENCOUNTER — PATIENT OUTREACH (OUTPATIENT)
Dept: ADMINISTRATIVE | Facility: OTHER | Age: 45
End: 2020-11-17

## 2020-11-18 ENCOUNTER — OFFICE VISIT (OUTPATIENT)
Dept: ORTHOPEDICS | Facility: CLINIC | Age: 45
End: 2020-11-18
Payer: OTHER GOVERNMENT

## 2020-11-18 ENCOUNTER — IMMUNIZATION (OUTPATIENT)
Dept: PHARMACY | Facility: CLINIC | Age: 45
End: 2020-11-18
Payer: OTHER GOVERNMENT

## 2020-11-18 VITALS
HEIGHT: 71 IN | HEART RATE: 83 BPM | SYSTOLIC BLOOD PRESSURE: 133 MMHG | DIASTOLIC BLOOD PRESSURE: 91 MMHG | BODY MASS INDEX: 28.7 KG/M2 | WEIGHT: 205 LBS

## 2020-11-18 DIAGNOSIS — M25.511 RIGHT SHOULDER PAIN, UNSPECIFIED CHRONICITY: ICD-10-CM

## 2020-11-18 DIAGNOSIS — M75.21 BICEPS TENDINITIS OF RIGHT UPPER EXTREMITY: Primary | ICD-10-CM

## 2020-11-18 DIAGNOSIS — M75.81 ROTATOR CUFF TENDONITIS, RIGHT: ICD-10-CM

## 2020-11-18 PROCEDURE — 99214 OFFICE O/P EST MOD 30 MIN: CPT | Mod: S$PBB,,, | Performed by: PHYSICAL MEDICINE & REHABILITATION

## 2020-11-18 PROCEDURE — 99999 PR PBB SHADOW E&M-EST. PATIENT-LVL III: ICD-10-PCS | Mod: PBBFAC,,, | Performed by: PHYSICAL MEDICINE & REHABILITATION

## 2020-11-18 PROCEDURE — 99213 OFFICE O/P EST LOW 20 MIN: CPT | Mod: PBBFAC | Performed by: PHYSICAL MEDICINE & REHABILITATION

## 2020-11-18 PROCEDURE — 99999 PR PBB SHADOW E&M-EST. PATIENT-LVL III: CPT | Mod: PBBFAC,,, | Performed by: PHYSICAL MEDICINE & REHABILITATION

## 2020-11-18 PROCEDURE — 99214 PR OFFICE/OUTPT VISIT, EST, LEVL IV, 30-39 MIN: ICD-10-PCS | Mod: S$PBB,,, | Performed by: PHYSICAL MEDICINE & REHABILITATION

## 2020-11-18 NOTE — PATIENT INSTRUCTIONS
Shoulder Blade Rehab:    Scapula/Back Mobilization:  Prior to working out, or about every other day, spend a minute or two rolling out tight spots behind the neck and around the shoulder blade      Pec Minor Mobilization: Rolling out Pec Minor can help loosen this muscle up as well, helping shoulder positioning and posture.       Scapula Exercises    Pick one exercise from group A & B below to do 3 days a week. Shoot for roughly 3 sets of 10 reps of each exercise. Make sure to use good technique, keep ribs down and in, and do as many as you can until you are fatigued.     Group A:    Band over door straight arm pull down:  Theraband over doorway, straight arm pull down. Squeeze shoulder blades down and back. Rotate head to each side, then relax arms. That's one rep.     Theraband over doorway seated pull down:      Theraband Rows:        Group B:    Band Dislocates:       Band Pull-Aparts: Palms up, thumbs pointing out instead of overhand  like in the picture.    Stretching:    Stretch: Tight Pec Minor muscles can keep the shoulder rolled forward, worsening thoracic posture. Daily pec minor stretching for a few minutes can eventually loosen this up. You do want the elbow to be above the level of the shoulder, like where the sara has his arm. If you use the floor stretch like the girl, raise your arm about 30 degrees higher than hers.    or

## 2020-11-18 NOTE — PROGRESS NOTES
SPORTS MEDICINE / PM&R Follow Up Visit :    Referring Physician: No ref. provider found    Chief Complaint   Patient presents with    Right Shoulder - Pain       HPI: This is a 45 y.o.  male being seen in clinic today for evaluation of Pain of the Right Shoulder       Since last visit, the symptoms are improving.  He was initially pretty sore for 2 days after her appointment, but then pain seemed to completely resolve.  He has not been back to therapy and did not continue his home exercise program. He states the injection improved his pain.  When he called to set up his MRI he was told to need to go to our office.  We were not contacted by Radiology nor did the patient let us know about this.  He wonders if he still needs the MRI.  He was essentially pain free into approximately 8 days ago mostly notes that he has had some popping return in the shoulder which had previously been alleviated after injection.  Overall he is only having very mild pain as most of the popping is painless.  He also still notes greatly improved range of motion.  He inquires about starting to get back to performing exercises in the gym.    History obtained from patient.    Past family, medical, social, surgical history, and vital signs reviewed in chart.    Review of Systems   Constitutional: Negative for chills, fever and weight loss.   HENT: Negative for hearing loss and sore throat.    Eyes: Negative for blurred vision, photophobia and pain.   Respiratory: Negative for shortness of breath.    Cardiovascular: Negative for chest pain.   Gastrointestinal: Negative for abdominal pain.   Genitourinary: Negative for dysuria.   Skin: Negative for rash.   Neurological: Negative for tingling and headaches.   Endo/Heme/Allergies: Does not bruise/bleed easily.   Psychiatric/Behavioral: Negative for depression.       General    Nursing note and vitals reviewed.  Constitutional: He is oriented to person, place, and time. He appears well-developed and  well-nourished.   HENT:   Head: Normocephalic and atraumatic.   Eyes: Conjunctivae and EOM are normal. Pupils are equal, round, and reactive to light.   Neck: Neck supple.   Cardiovascular: Intact distal pulses.    Pulmonary/Chest: Effort normal. No respiratory distress.   Abdominal: He exhibits no distension.   Neurological: He is alert and oriented to person, place, and time. He has normal reflexes.   Psychiatric: He has a normal mood and affect.         Right Shoulder Exam     Inspection/Observation   Swelling: absent  Scars: absent  Deformity: absent  Scapular Winging: absent  Scapular Dyskinesia: positive    Crepitus   The patient has crepitus of the greater tuberosity.    Range of Motion   Active abduction: normal   Passive abduction: normal   Extension: abnormal   Forward Flexion: normal   Adduction: normal  External Rotation 0 degrees: normal   Internal rotation 0 degrees:  T10 abnormal     Muscle Strength   The patient has normal right shoulder strength.    Tests & Signs   Apprehension: negative  Carter test: negative  Impingement: negative  Sulcus: absent  Rotator Cuff Painful Arc/Range: mild  Lift Off Sign: positive  Active Compression Test (Helen's Sign): negative  Speed's Test: positive  Jerk Test: negative    Other   Sensation: normal    Left Shoulder Exam   Left shoulder exam is normal.    Inspection/Observation   Scars: absent  Scapular Winging: absent  Scapular Dyskinesia: negative    Range of Motion   Active abduction: normal   Passive abduction: normal   Extension: normal   Forward Flexion: normal   Adduction: normal  External Rotation 0 degrees: normal   Internal rotation 0 degrees: normal     Muscle Strength   The patient has normal left shoulder strength.    Tests & Signs   Apprehension: negative  Carter test: negative  Impingement: negative  Sulcus: absent  Speed's Test: negative  Jerk Test: negative    Other   Sensation: normal       Muscle Strength   Right Upper Extremity   Shoulder  Abduction: 5/5   Shoulder Internal Rotation: 5/5   Shoulder External Rotation: 4/5   Supraspinatus: 5/5   Biceps: 4/5     Reflexes     Left Side  Biceps:  2+  Brachioradialis:  2+  Left Pandey's Sign:  Absent    Right Side   Biceps:  2+  Brachioradialis:  2+  Right Pandey's Sign:  absent    Vascular Exam     Right Pulses      Radial:                    2+      Left Pulses      Radial:                    2+      Capillary Refill  Right Hand: normal capillary refill  Left Hand: normal capillary refill        IMPRESSION/PLAN: This is a 45 y.o.  male with:    Biceps tendinitis of right upper extremity    Right shoulder pain, unspecified chronicity    Rotator cuff tendonitis, right        The findings were discussed with Hi in detail.  I am glad he had good relief from the injection.  Today he shows significantly less pain and improvement in physical exam.  I recommended we save injections for when he has significant flareups to help limit any possible side effects from that.  [facility].  But at this point, since he is doing better, we will hold off on MRI for now.  I recommend he resume his therapy exercises for the shoulder and also resume his typical gym exercises.  I recommend he do the therapy exercise for warm-ups, and discussed the importance of taking it easy and gently progressing his regular exercise.  He will continue NSAIDs as needed.  He was provided with this plan in writing. All of his questions were answered. He will follow up with me in p.r.n.     Disclaimer: This note was prepared using a voice recognition system and is likely to have sound alike errors within the text.     Talia Garcia M.D.  Sports Medicine

## 2021-03-02 ENCOUNTER — TELEPHONE (OUTPATIENT)
Dept: WOUND CARE | Facility: CLINIC | Age: 46
End: 2021-03-02

## 2021-03-03 ENCOUNTER — TELEPHONE (OUTPATIENT)
Dept: WOUND CARE | Facility: CLINIC | Age: 46
End: 2021-03-03

## 2021-03-04 ENCOUNTER — OFFICE VISIT (OUTPATIENT)
Dept: WOUND CARE | Facility: CLINIC | Age: 46
End: 2021-03-04
Payer: OTHER GOVERNMENT

## 2021-03-04 VITALS
SYSTOLIC BLOOD PRESSURE: 116 MMHG | HEART RATE: 80 BPM | DIASTOLIC BLOOD PRESSURE: 85 MMHG | RESPIRATION RATE: 18 BRPM | WEIGHT: 205 LBS | BODY MASS INDEX: 28.7 KG/M2 | HEIGHT: 71 IN

## 2021-03-04 DIAGNOSIS — S91.301A OPEN WOUND OF PLANTAR ASPECT OF FOOT, RIGHT, INITIAL ENCOUNTER: Primary | ICD-10-CM

## 2021-03-04 DIAGNOSIS — M25.561 CHRONIC PAIN OF RIGHT KNEE: ICD-10-CM

## 2021-03-04 DIAGNOSIS — G89.29 CHRONIC PAIN OF RIGHT KNEE: ICD-10-CM

## 2021-03-04 PROBLEM — S91.309A OPEN WOUND OF PLANTAR ASPECT OF FOOT: Status: ACTIVE | Noted: 2021-03-04

## 2021-03-04 PROCEDURE — 99202 PR OFFICE/OUTPT VISIT, NEW, LEVL II, 15-29 MIN: ICD-10-PCS | Mod: S$PBB,,, | Performed by: FAMILY MEDICINE

## 2021-03-04 PROCEDURE — 99999 PR PBB SHADOW E&M-EST. PATIENT-LVL IV: CPT | Mod: PBBFAC,,, | Performed by: FAMILY MEDICINE

## 2021-03-04 PROCEDURE — 99202 OFFICE O/P NEW SF 15 MIN: CPT | Mod: S$PBB,,, | Performed by: FAMILY MEDICINE

## 2021-03-04 PROCEDURE — 99999 PR PBB SHADOW E&M-EST. PATIENT-LVL IV: ICD-10-PCS | Mod: PBBFAC,,, | Performed by: FAMILY MEDICINE

## 2021-03-04 PROCEDURE — 99214 OFFICE O/P EST MOD 30 MIN: CPT | Mod: PBBFAC,PO | Performed by: FAMILY MEDICINE

## 2021-03-05 ENCOUNTER — TELEPHONE (OUTPATIENT)
Dept: ORTHOPEDICS | Facility: CLINIC | Age: 46
End: 2021-03-05

## 2021-03-10 ENCOUNTER — PATIENT MESSAGE (OUTPATIENT)
Dept: WOUND CARE | Facility: CLINIC | Age: 46
End: 2021-03-10

## 2021-03-11 ENCOUNTER — IMMUNIZATION (OUTPATIENT)
Dept: OBSTETRICS AND GYNECOLOGY | Facility: CLINIC | Age: 46
End: 2021-03-11
Payer: OTHER GOVERNMENT

## 2021-03-11 DIAGNOSIS — Z23 NEED FOR VACCINATION: Primary | ICD-10-CM

## 2021-03-11 PROCEDURE — 0031A COVID-19,VECTOR-NR,RS-AD26,PF,0.5 ML DOSE VACCINE (JANSSEN): CPT | Mod: PBBFAC

## 2021-03-17 ENCOUNTER — TELEPHONE (OUTPATIENT)
Dept: ORTHOPEDICS | Facility: CLINIC | Age: 46
End: 2021-03-17

## 2021-03-22 DIAGNOSIS — M25.562 PAIN IN BOTH KNEES, UNSPECIFIED CHRONICITY: Primary | ICD-10-CM

## 2021-03-22 DIAGNOSIS — M25.561 PAIN IN BOTH KNEES, UNSPECIFIED CHRONICITY: Primary | ICD-10-CM

## 2021-03-24 ENCOUNTER — HOSPITAL ENCOUNTER (OUTPATIENT)
Dept: RADIOLOGY | Facility: HOSPITAL | Age: 46
Discharge: HOME | End: 2021-03-24
Attending: ORTHOPAEDIC SURGERY
Payer: OTHER GOVERNMENT

## 2021-03-24 ENCOUNTER — OFFICE VISIT (OUTPATIENT)
Dept: PODIATRY | Facility: CLINIC | Age: 46
End: 2021-03-24
Payer: OTHER GOVERNMENT

## 2021-03-24 ENCOUNTER — OFFICE VISIT (OUTPATIENT)
Dept: ORTHOPEDICS | Facility: CLINIC | Age: 46
End: 2021-03-24
Payer: OTHER GOVERNMENT

## 2021-03-24 VITALS — HEIGHT: 71 IN | BODY MASS INDEX: 28.7 KG/M2 | WEIGHT: 205 LBS

## 2021-03-24 VITALS — WEIGHT: 205 LBS | HEIGHT: 71 IN | BODY MASS INDEX: 28.7 KG/M2

## 2021-03-24 DIAGNOSIS — M79.671 FOOT PAIN, RIGHT: ICD-10-CM

## 2021-03-24 DIAGNOSIS — F41.9 ANXIETY: ICD-10-CM

## 2021-03-24 DIAGNOSIS — M25.561 CHRONIC PAIN OF RIGHT KNEE: ICD-10-CM

## 2021-03-24 DIAGNOSIS — M25.562 PAIN IN BOTH KNEES, UNSPECIFIED CHRONICITY: ICD-10-CM

## 2021-03-24 DIAGNOSIS — B07.0 PLANTAR WART OF RIGHT FOOT: Primary | ICD-10-CM

## 2021-03-24 DIAGNOSIS — G89.29 CHRONIC PAIN OF RIGHT KNEE: ICD-10-CM

## 2021-03-24 DIAGNOSIS — M17.0 OSTEOARTHRITIS OF BOTH KNEES, UNSPECIFIED OSTEOARTHRITIS TYPE: Primary | ICD-10-CM

## 2021-03-24 DIAGNOSIS — M25.561 PAIN IN BOTH KNEES, UNSPECIFIED CHRONICITY: ICD-10-CM

## 2021-03-24 PROCEDURE — 73562 X-RAY EXAM OF KNEE 3: CPT | Mod: 26,50,, | Performed by: RADIOLOGY

## 2021-03-24 PROCEDURE — 99213 OFFICE O/P EST LOW 20 MIN: CPT | Mod: PBBFAC,25,27,PN | Performed by: ORTHOPAEDIC SURGERY

## 2021-03-24 PROCEDURE — 73562 XR KNEE ORTHO BILAT: ICD-10-PCS | Mod: 26,50,, | Performed by: RADIOLOGY

## 2021-03-24 PROCEDURE — 99214 OFFICE O/P EST MOD 30 MIN: CPT | Mod: 25,S$PBB,, | Performed by: ORTHOPAEDIC SURGERY

## 2021-03-24 PROCEDURE — 99204 PR OFFICE/OUTPT VISIT, NEW, LEVL IV, 45-59 MIN: ICD-10-PCS | Mod: 25,S$PBB,, | Performed by: PODIATRIST

## 2021-03-24 PROCEDURE — 99214 PR OFFICE/OUTPT VISIT, EST, LEVL IV, 30-39 MIN: ICD-10-PCS | Mod: 25,S$PBB,, | Performed by: ORTHOPAEDIC SURGERY

## 2021-03-24 PROCEDURE — 99999 PR PBB SHADOW E&M-EST. PATIENT-LVL III: ICD-10-PCS | Mod: PBBFAC,,, | Performed by: ORTHOPAEDIC SURGERY

## 2021-03-24 PROCEDURE — 11900 INJECT SKIN LESIONS </W 7: CPT | Mod: S$PBB,,, | Performed by: PODIATRIST

## 2021-03-24 PROCEDURE — 99213 OFFICE O/P EST LOW 20 MIN: CPT | Mod: PBBFAC,25,PN | Performed by: PODIATRIST

## 2021-03-24 PROCEDURE — 11900 PR INJECTION INTO SKIN LESIONS, UP TO 7: ICD-10-PCS | Mod: S$PBB,,, | Performed by: PODIATRIST

## 2021-03-24 PROCEDURE — 99999 PR PBB SHADOW E&M-EST. PATIENT-LVL III: CPT | Mod: PBBFAC,,, | Performed by: PODIATRIST

## 2021-03-24 PROCEDURE — 11900 INJECT SKIN LESIONS </W 7: CPT | Mod: PBBFAC,PN | Performed by: PODIATRIST

## 2021-03-24 PROCEDURE — 99999 PR PBB SHADOW E&M-EST. PATIENT-LVL III: CPT | Mod: PBBFAC,,, | Performed by: ORTHOPAEDIC SURGERY

## 2021-03-24 PROCEDURE — 20610 DRAIN/INJ JOINT/BURSA W/O US: CPT | Mod: 50,PBBFAC,PN | Performed by: ORTHOPAEDIC SURGERY

## 2021-03-24 PROCEDURE — 99204 OFFICE O/P NEW MOD 45 MIN: CPT | Mod: 25,S$PBB,, | Performed by: PODIATRIST

## 2021-03-24 PROCEDURE — 99999 PR PBB SHADOW E&M-EST. PATIENT-LVL III: ICD-10-PCS | Mod: PBBFAC,,, | Performed by: PODIATRIST

## 2021-03-24 PROCEDURE — 73562 X-RAY EXAM OF KNEE 3: CPT | Mod: TC,50,PO

## 2021-03-24 PROCEDURE — 20610 LARGE JOINT ASPIRATION/INJECTION: BILATERAL KNEE: ICD-10-PCS | Mod: 50,S$PBB,, | Performed by: ORTHOPAEDIC SURGERY

## 2021-03-24 RX ORDER — TRIAMCINOLONE ACETONIDE 40 MG/ML
40 INJECTION, SUSPENSION INTRA-ARTICULAR; INTRAMUSCULAR
Status: DISCONTINUED | OUTPATIENT
Start: 2021-03-24 | End: 2021-03-24 | Stop reason: HOSPADM

## 2021-03-24 RX ADMIN — TRIAMCINOLONE ACETONIDE 40 MG: 40 INJECTION, SUSPENSION INTRA-ARTICULAR; INTRAMUSCULAR at 08:03

## 2021-03-24 RX ADMIN — CANDIDA ALBICANS SKIN TEST ANTIGEN 0.3 ML: 1 INJECTION, SOLUTION INTRADERMAL at 08:03

## 2021-04-14 ENCOUNTER — OFFICE VISIT (OUTPATIENT)
Dept: PODIATRY | Facility: CLINIC | Age: 46
End: 2021-04-14
Payer: OTHER GOVERNMENT

## 2021-04-14 VITALS — WEIGHT: 205 LBS | HEIGHT: 71 IN | BODY MASS INDEX: 28.7 KG/M2

## 2021-04-14 DIAGNOSIS — B07.0 PLANTAR WART OF RIGHT FOOT: Primary | ICD-10-CM

## 2021-04-14 DIAGNOSIS — M79.671 FOOT PAIN, RIGHT: ICD-10-CM

## 2021-04-14 PROCEDURE — 99999 PR PBB SHADOW E&M-EST. PATIENT-LVL II: CPT | Mod: PBBFAC,,, | Performed by: PODIATRIST

## 2021-04-14 PROCEDURE — 99499 NO LOS: ICD-10-PCS | Mod: S$PBB,,, | Performed by: PODIATRIST

## 2021-04-14 PROCEDURE — 11900 INJECT SKIN LESIONS </W 7: CPT | Mod: S$PBB,,, | Performed by: PODIATRIST

## 2021-04-14 PROCEDURE — 99212 OFFICE O/P EST SF 10 MIN: CPT | Mod: PBBFAC,PN,25 | Performed by: PODIATRIST

## 2021-04-14 PROCEDURE — 11900 INJECT SKIN LESIONS </W 7: CPT | Mod: PBBFAC,PN | Performed by: PODIATRIST

## 2021-04-14 PROCEDURE — 99499 UNLISTED E&M SERVICE: CPT | Mod: S$PBB,,, | Performed by: PODIATRIST

## 2021-04-14 PROCEDURE — 11900 PR INJECTION INTO SKIN LESIONS, UP TO 7: ICD-10-PCS | Mod: S$PBB,,, | Performed by: PODIATRIST

## 2021-04-14 PROCEDURE — 99999 PR PBB SHADOW E&M-EST. PATIENT-LVL II: ICD-10-PCS | Mod: PBBFAC,,, | Performed by: PODIATRIST

## 2021-04-14 RX ADMIN — CANDIDA ALBICANS SKIN TEST ANTIGEN 0.3 ML: 1 INJECTION, SOLUTION INTRADERMAL at 07:04

## 2021-05-19 ENCOUNTER — OFFICE VISIT (OUTPATIENT)
Dept: PODIATRY | Facility: CLINIC | Age: 46
End: 2021-05-19
Payer: OTHER GOVERNMENT

## 2021-05-19 VITALS — HEIGHT: 71 IN | WEIGHT: 205 LBS | BODY MASS INDEX: 28.7 KG/M2

## 2021-05-19 DIAGNOSIS — B07.0 PLANTAR WART OF RIGHT FOOT: Primary | ICD-10-CM

## 2021-05-19 DIAGNOSIS — M79.671 FOOT PAIN, RIGHT: ICD-10-CM

## 2021-05-19 PROCEDURE — 11900 INJECT SKIN LESIONS </W 7: CPT | Mod: S$PBB,,, | Performed by: PODIATRIST

## 2021-05-19 PROCEDURE — 11900 INJECT SKIN LESIONS </W 7: CPT | Mod: PBBFAC,PN | Performed by: PODIATRIST

## 2021-05-19 PROCEDURE — 99999 PR PBB SHADOW E&M-EST. PATIENT-LVL II: ICD-10-PCS | Mod: PBBFAC,,, | Performed by: PODIATRIST

## 2021-05-19 PROCEDURE — 99212 OFFICE O/P EST SF 10 MIN: CPT | Mod: PBBFAC,PN | Performed by: PODIATRIST

## 2021-05-19 PROCEDURE — 11900 PR INJECTION INTO SKIN LESIONS, UP TO 7: ICD-10-PCS | Mod: S$PBB,,, | Performed by: PODIATRIST

## 2021-05-19 PROCEDURE — 99499 NO LOS: ICD-10-PCS | Mod: S$PBB,,, | Performed by: PODIATRIST

## 2021-05-19 PROCEDURE — 99499 UNLISTED E&M SERVICE: CPT | Mod: S$PBB,,, | Performed by: PODIATRIST

## 2021-05-19 PROCEDURE — 99999 PR PBB SHADOW E&M-EST. PATIENT-LVL II: CPT | Mod: PBBFAC,,, | Performed by: PODIATRIST

## 2021-05-19 RX ADMIN — CANDIDA ALBICANS SKIN TEST ANTIGEN 0.3 ML: 1 INJECTION, SOLUTION INTRADERMAL at 08:05

## 2021-06-02 ENCOUNTER — PATIENT MESSAGE (OUTPATIENT)
Dept: ORTHOPEDICS | Facility: CLINIC | Age: 46
End: 2021-06-02

## 2021-06-02 ENCOUNTER — PATIENT MESSAGE (OUTPATIENT)
Dept: WOUND CARE | Facility: CLINIC | Age: 46
End: 2021-06-02

## 2021-06-07 ENCOUNTER — OFFICE VISIT (OUTPATIENT)
Dept: WOUND CARE | Facility: CLINIC | Age: 46
End: 2021-06-07
Payer: OTHER GOVERNMENT

## 2021-06-07 VITALS
RESPIRATION RATE: 20 BRPM | DIASTOLIC BLOOD PRESSURE: 80 MMHG | HEART RATE: 89 BPM | SYSTOLIC BLOOD PRESSURE: 120 MMHG | TEMPERATURE: 98 F

## 2021-06-07 DIAGNOSIS — G89.29 CHRONIC RIGHT SHOULDER PAIN: Primary | ICD-10-CM

## 2021-06-07 DIAGNOSIS — M25.511 CHRONIC RIGHT SHOULDER PAIN: Primary | ICD-10-CM

## 2021-06-07 PROCEDURE — 99213 OFFICE O/P EST LOW 20 MIN: CPT | Mod: PBBFAC,PO | Performed by: FAMILY MEDICINE

## 2021-06-07 PROCEDURE — 99999 PR PBB SHADOW E&M-EST. PATIENT-LVL III: CPT | Mod: PBBFAC,,, | Performed by: FAMILY MEDICINE

## 2021-06-07 PROCEDURE — 99213 OFFICE O/P EST LOW 20 MIN: CPT | Mod: S$PBB,,, | Performed by: FAMILY MEDICINE

## 2021-06-07 PROCEDURE — 99213 PR OFFICE/OUTPT VISIT, EST, LEVL III, 20-29 MIN: ICD-10-PCS | Mod: S$PBB,,, | Performed by: FAMILY MEDICINE

## 2021-06-07 PROCEDURE — 99999 PR PBB SHADOW E&M-EST. PATIENT-LVL III: ICD-10-PCS | Mod: PBBFAC,,, | Performed by: FAMILY MEDICINE

## 2021-06-22 ENCOUNTER — PATIENT MESSAGE (OUTPATIENT)
Dept: PODIATRY | Facility: CLINIC | Age: 46
End: 2021-06-22

## 2021-07-12 ENCOUNTER — PATIENT MESSAGE (OUTPATIENT)
Dept: PODIATRY | Facility: CLINIC | Age: 46
End: 2021-07-12

## 2021-07-12 ENCOUNTER — OFFICE VISIT (OUTPATIENT)
Dept: PODIATRY | Facility: CLINIC | Age: 46
End: 2021-07-12
Payer: OTHER GOVERNMENT

## 2021-07-12 ENCOUNTER — PATIENT MESSAGE (OUTPATIENT)
Dept: WOUND CARE | Facility: CLINIC | Age: 46
End: 2021-07-12

## 2021-07-12 VITALS — WEIGHT: 205 LBS | BODY MASS INDEX: 28.7 KG/M2 | HEIGHT: 71 IN

## 2021-07-12 DIAGNOSIS — B07.0 PLANTAR WART OF RIGHT FOOT: Primary | ICD-10-CM

## 2021-07-12 DIAGNOSIS — M79.671 FOOT PAIN, RIGHT: ICD-10-CM

## 2021-07-12 PROCEDURE — 99212 PR OFFICE/OUTPT VISIT, EST, LEVL II, 10-19 MIN: ICD-10-PCS | Mod: S$PBB,,, | Performed by: PODIATRIST

## 2021-07-12 PROCEDURE — 99213 OFFICE O/P EST LOW 20 MIN: CPT | Mod: PBBFAC,PN | Performed by: PODIATRIST

## 2021-07-12 PROCEDURE — 99999 PR PBB SHADOW E&M-EST. PATIENT-LVL III: CPT | Mod: PBBFAC,,, | Performed by: PODIATRIST

## 2021-07-12 PROCEDURE — 99212 OFFICE O/P EST SF 10 MIN: CPT | Mod: S$PBB,,, | Performed by: PODIATRIST

## 2021-07-12 PROCEDURE — 99999 PR PBB SHADOW E&M-EST. PATIENT-LVL III: ICD-10-PCS | Mod: PBBFAC,,, | Performed by: PODIATRIST

## 2021-07-13 ENCOUNTER — OFFICE VISIT (OUTPATIENT)
Dept: WOUND CARE | Facility: CLINIC | Age: 46
End: 2021-07-13
Payer: OTHER GOVERNMENT

## 2021-07-13 ENCOUNTER — LAB VISIT (OUTPATIENT)
Dept: LAB | Facility: HOSPITAL | Age: 46
End: 2021-07-13
Attending: FAMILY MEDICINE
Payer: OTHER GOVERNMENT

## 2021-07-13 ENCOUNTER — PATIENT MESSAGE (OUTPATIENT)
Dept: PODIATRY | Facility: CLINIC | Age: 46
End: 2021-07-13

## 2021-07-13 VITALS
BODY MASS INDEX: 28.59 KG/M2 | SYSTOLIC BLOOD PRESSURE: 121 MMHG | HEART RATE: 84 BPM | WEIGHT: 205 LBS | RESPIRATION RATE: 20 BRPM | TEMPERATURE: 99 F | DIASTOLIC BLOOD PRESSURE: 80 MMHG

## 2021-07-13 DIAGNOSIS — Z01.818 PRE-OPERATIVE GENERAL PHYSICAL EXAMINATION: Primary | ICD-10-CM

## 2021-07-13 DIAGNOSIS — Z01.818 PRE-OPERATIVE GENERAL PHYSICAL EXAMINATION: ICD-10-CM

## 2021-07-13 LAB
ALBUMIN SERPL BCP-MCNC: 4.2 G/DL (ref 3.5–5.2)
ALP SERPL-CCNC: 72 U/L (ref 55–135)
ALT SERPL W/O P-5'-P-CCNC: 36 U/L (ref 10–44)
ANION GAP SERPL CALC-SCNC: 6 MMOL/L (ref 8–16)
AST SERPL-CCNC: 24 U/L (ref 10–40)
BASOPHILS # BLD AUTO: 0.06 K/UL (ref 0–0.2)
BASOPHILS NFR BLD: 1.3 % (ref 0–1.9)
BILIRUB SERPL-MCNC: 1.2 MG/DL (ref 0.1–1)
BUN SERPL-MCNC: 18 MG/DL (ref 6–20)
CALCIUM SERPL-MCNC: 9.5 MG/DL (ref 8.7–10.5)
CHLORIDE SERPL-SCNC: 106 MMOL/L (ref 95–110)
CO2 SERPL-SCNC: 28 MMOL/L (ref 23–29)
CREAT SERPL-MCNC: 0.9 MG/DL (ref 0.5–1.4)
DIFFERENTIAL METHOD: NORMAL
EOSINOPHIL # BLD AUTO: 0.3 K/UL (ref 0–0.5)
EOSINOPHIL NFR BLD: 5.3 % (ref 0–8)
ERYTHROCYTE [DISTWIDTH] IN BLOOD BY AUTOMATED COUNT: 11.7 % (ref 11.5–14.5)
EST. GFR  (AFRICAN AMERICAN): >60 ML/MIN/1.73 M^2
EST. GFR  (NON AFRICAN AMERICAN): >60 ML/MIN/1.73 M^2
GLUCOSE SERPL-MCNC: 94 MG/DL (ref 70–110)
HCT VFR BLD AUTO: 43.2 % (ref 40–54)
HGB BLD-MCNC: 14.3 G/DL (ref 14–18)
IMM GRANULOCYTES # BLD AUTO: 0.02 K/UL (ref 0–0.04)
IMM GRANULOCYTES NFR BLD AUTO: 0.4 % (ref 0–0.5)
INR PPP: 1 (ref 0.8–1.2)
LYMPHOCYTES # BLD AUTO: 1.1 K/UL (ref 1–4.8)
LYMPHOCYTES NFR BLD: 23.3 % (ref 18–48)
MCH RBC QN AUTO: 30 PG (ref 27–31)
MCHC RBC AUTO-ENTMCNC: 33.1 G/DL (ref 32–36)
MCV RBC AUTO: 91 FL (ref 82–98)
MONOCYTES # BLD AUTO: 0.4 K/UL (ref 0.3–1)
MONOCYTES NFR BLD: 8.1 % (ref 4–15)
NEUTROPHILS # BLD AUTO: 2.9 K/UL (ref 1.8–7.7)
NEUTROPHILS NFR BLD: 61.6 % (ref 38–73)
NRBC BLD-RTO: 0 /100 WBC
PLATELET # BLD AUTO: 174 K/UL (ref 150–450)
PMV BLD AUTO: 10.8 FL (ref 9.2–12.9)
POTASSIUM SERPL-SCNC: 4.3 MMOL/L (ref 3.5–5.1)
PROT SERPL-MCNC: 7.2 G/DL (ref 6–8.4)
PROTHROMBIN TIME: 11 SEC (ref 9–12.5)
RBC # BLD AUTO: 4.77 M/UL (ref 4.6–6.2)
SODIUM SERPL-SCNC: 140 MMOL/L (ref 136–145)
WBC # BLD AUTO: 4.72 K/UL (ref 3.9–12.7)

## 2021-07-13 PROCEDURE — 85610 PROTHROMBIN TIME: CPT | Mod: PO | Performed by: FAMILY MEDICINE

## 2021-07-13 PROCEDURE — 85025 COMPLETE CBC W/AUTO DIFF WBC: CPT | Performed by: FAMILY MEDICINE

## 2021-07-13 PROCEDURE — 36415 COLL VENOUS BLD VENIPUNCTURE: CPT | Mod: PO | Performed by: FAMILY MEDICINE

## 2021-07-13 PROCEDURE — 99213 OFFICE O/P EST LOW 20 MIN: CPT | Mod: S$PBB,,, | Performed by: FAMILY MEDICINE

## 2021-07-13 PROCEDURE — 80053 COMPREHEN METABOLIC PANEL: CPT | Performed by: FAMILY MEDICINE

## 2021-07-13 PROCEDURE — 99213 PR OFFICE/OUTPT VISIT, EST, LEVL III, 20-29 MIN: ICD-10-PCS | Mod: S$PBB,,, | Performed by: FAMILY MEDICINE

## 2021-07-13 PROCEDURE — 99999 PR PBB SHADOW E&M-EST. PATIENT-LVL III: CPT | Mod: PBBFAC,,, | Performed by: FAMILY MEDICINE

## 2021-07-13 PROCEDURE — 99213 OFFICE O/P EST LOW 20 MIN: CPT | Mod: PBBFAC,PO | Performed by: FAMILY MEDICINE

## 2021-07-13 PROCEDURE — 99999 PR PBB SHADOW E&M-EST. PATIENT-LVL III: ICD-10-PCS | Mod: PBBFAC,,, | Performed by: FAMILY MEDICINE

## 2021-07-14 ENCOUNTER — PATIENT MESSAGE (OUTPATIENT)
Dept: PODIATRY | Facility: CLINIC | Age: 46
End: 2021-07-14

## 2021-07-15 ENCOUNTER — PATIENT MESSAGE (OUTPATIENT)
Dept: WOUND CARE | Facility: CLINIC | Age: 46
End: 2021-07-15

## 2021-07-15 DIAGNOSIS — B07.0 PLANTAR WART OF RIGHT FOOT: Primary | ICD-10-CM

## 2021-07-16 ENCOUNTER — PATIENT MESSAGE (OUTPATIENT)
Dept: FAMILY MEDICINE | Facility: CLINIC | Age: 46
End: 2021-07-16

## 2021-07-16 ENCOUNTER — PATIENT MESSAGE (OUTPATIENT)
Dept: WOUND CARE | Facility: CLINIC | Age: 46
End: 2021-07-16

## 2021-07-16 ENCOUNTER — PATIENT MESSAGE (OUTPATIENT)
Dept: PODIATRY | Facility: CLINIC | Age: 46
End: 2021-07-16

## 2021-07-19 ENCOUNTER — HOSPITAL ENCOUNTER (OUTPATIENT)
Dept: RADIOLOGY | Facility: HOSPITAL | Age: 46
Discharge: HOME | End: 2021-07-19
Attending: PHYSICAL MEDICINE & REHABILITATION
Payer: OTHER GOVERNMENT

## 2021-07-19 ENCOUNTER — TELEPHONE (OUTPATIENT)
Dept: PODIATRY | Facility: CLINIC | Age: 46
End: 2021-07-19

## 2021-07-19 PROCEDURE — 73221 MRI JOINT UPR EXTREM W/O DYE: CPT | Mod: TC,PO,RT

## 2021-07-19 PROCEDURE — 73221 MRI JOINT UPR EXTREM W/O DYE: CPT | Mod: 26,RT,, | Performed by: RADIOLOGY

## 2021-07-19 PROCEDURE — 73221 MRI SHOULDER WITHOUT CONTRAST RIGHT: ICD-10-PCS | Mod: 26,RT,, | Performed by: RADIOLOGY

## 2021-07-20 ENCOUNTER — TELEPHONE (OUTPATIENT)
Dept: PODIATRY | Facility: CLINIC | Age: 46
End: 2021-07-20

## 2021-07-21 ENCOUNTER — TELEPHONE (OUTPATIENT)
Dept: PODIATRY | Facility: CLINIC | Age: 46
End: 2021-07-21

## 2021-07-21 ENCOUNTER — ANESTHESIA EVENT (OUTPATIENT)
Dept: SURGERY | Facility: HOSPITAL | Age: 46
End: 2021-07-21
Payer: OTHER GOVERNMENT

## 2021-07-22 ENCOUNTER — HOSPITAL ENCOUNTER (OUTPATIENT)
Facility: HOSPITAL | Age: 46
Discharge: HOME | End: 2021-07-22
Attending: PODIATRIST | Admitting: PODIATRIST
Payer: OTHER GOVERNMENT

## 2021-07-22 ENCOUNTER — ANESTHESIA (OUTPATIENT)
Dept: SURGERY | Facility: HOSPITAL | Age: 46
End: 2021-07-22
Payer: OTHER GOVERNMENT

## 2021-07-22 VITALS
TEMPERATURE: 98 F | HEIGHT: 71 IN | WEIGHT: 206 LBS | HEART RATE: 73 BPM | DIASTOLIC BLOOD PRESSURE: 80 MMHG | RESPIRATION RATE: 14 BRPM | OXYGEN SATURATION: 95 % | SYSTOLIC BLOOD PRESSURE: 131 MMHG | BODY MASS INDEX: 28.84 KG/M2

## 2021-07-22 DIAGNOSIS — B07.0 PLANTAR WART OF RIGHT FOOT: Primary | ICD-10-CM

## 2021-07-22 PROCEDURE — 88305 TISSUE EXAM BY PATHOLOGIST: CPT | Mod: 26,,, | Performed by: DERMATOLOGY

## 2021-07-22 PROCEDURE — 25000003 PHARM REV CODE 250: Mod: PO | Performed by: ANESTHESIOLOGY

## 2021-07-22 PROCEDURE — 25000003 PHARM REV CODE 250: Mod: PO | Performed by: PODIATRIST

## 2021-07-22 PROCEDURE — 11422 PR EXC SKIN BENIG 1.1-2 CM REMAINDR BODY: ICD-10-PCS | Mod: ,,, | Performed by: PODIATRIST

## 2021-07-22 PROCEDURE — 00400 ANES INTEGUMENTARY SYS NOS: CPT | Mod: PO | Performed by: PODIATRIST

## 2021-07-22 PROCEDURE — 11422 EXC H-F-NK-SP B9+MARG 1.1-2: CPT | Mod: ,,, | Performed by: PODIATRIST

## 2021-07-22 PROCEDURE — 88305 TISSUE EXAM BY PATHOLOGIST: ICD-10-PCS | Mod: 26,,, | Performed by: DERMATOLOGY

## 2021-07-22 PROCEDURE — 63600175 PHARM REV CODE 636 W HCPCS: Mod: PO | Performed by: ANESTHESIOLOGY

## 2021-07-22 PROCEDURE — 63600175 PHARM REV CODE 636 W HCPCS: Mod: PO | Performed by: NURSE ANESTHETIST, CERTIFIED REGISTERED

## 2021-07-22 PROCEDURE — 88342 IMHCHEM/IMCYTCHM 1ST ANTB: CPT | Performed by: DERMATOLOGY

## 2021-07-22 PROCEDURE — D9220A PRA ANESTHESIA: Mod: CRNA,,, | Performed by: NURSE ANESTHETIST, CERTIFIED REGISTERED

## 2021-07-22 PROCEDURE — 71000015 HC POSTOP RECOV 1ST HR: Mod: PO | Performed by: PODIATRIST

## 2021-07-22 PROCEDURE — 37000008 HC ANESTHESIA 1ST 15 MINUTES: Mod: PO | Performed by: PODIATRIST

## 2021-07-22 PROCEDURE — 88305 TISSUE EXAM BY PATHOLOGIST: CPT | Performed by: DERMATOLOGY

## 2021-07-22 PROCEDURE — D9220A PRA ANESTHESIA: ICD-10-PCS | Mod: ANES,,, | Performed by: ANESTHESIOLOGY

## 2021-07-22 PROCEDURE — 25000003 PHARM REV CODE 250: Mod: PO | Performed by: NURSE ANESTHETIST, CERTIFIED REGISTERED

## 2021-07-22 PROCEDURE — D9220A PRA ANESTHESIA: ICD-10-PCS | Mod: CRNA,,, | Performed by: NURSE ANESTHETIST, CERTIFIED REGISTERED

## 2021-07-22 PROCEDURE — D9220A PRA ANESTHESIA: Mod: ANES,,, | Performed by: ANESTHESIOLOGY

## 2021-07-22 PROCEDURE — 36000706: Mod: PO | Performed by: PODIATRIST

## 2021-07-22 PROCEDURE — 36000707: Mod: PO | Performed by: PODIATRIST

## 2021-07-22 PROCEDURE — 88342 IMHCHEM/IMCYTCHM 1ST ANTB: CPT | Mod: 26,,, | Performed by: DERMATOLOGY

## 2021-07-22 PROCEDURE — 37000009 HC ANESTHESIA EA ADD 15 MINS: Mod: PO | Performed by: PODIATRIST

## 2021-07-22 PROCEDURE — 88342 CHG IMMUNOCYTOCHEMISTRY: ICD-10-PCS | Mod: 26,,, | Performed by: DERMATOLOGY

## 2021-07-22 RX ORDER — LIDOCAINE HYDROCHLORIDE 20 MG/ML
INJECTION INTRAVENOUS
Status: DISCONTINUED | OUTPATIENT
Start: 2021-07-22 | End: 2021-07-22

## 2021-07-22 RX ORDER — HYDROCODONE BITARTRATE AND ACETAMINOPHEN 5; 325 MG/1; MG/1
1 TABLET ORAL EVERY 4 HOURS PRN
Status: DISCONTINUED | OUTPATIENT
Start: 2021-07-22 | End: 2021-07-22 | Stop reason: HOSPADM

## 2021-07-22 RX ORDER — LIDOCAINE HYDROCHLORIDE 10 MG/ML
1 INJECTION, SOLUTION EPIDURAL; INFILTRATION; INTRACAUDAL; PERINEURAL ONCE
Status: COMPLETED | OUTPATIENT
Start: 2021-07-22 | End: 2021-07-22

## 2021-07-22 RX ORDER — BUPIVACAINE HYDROCHLORIDE 5 MG/ML
INJECTION, SOLUTION EPIDURAL; INTRACAUDAL
Status: DISCONTINUED | OUTPATIENT
Start: 2021-07-22 | End: 2021-07-22 | Stop reason: HOSPADM

## 2021-07-22 RX ORDER — HYDROCODONE BITARTRATE AND ACETAMINOPHEN 5; 325 MG/1; MG/1
1 TABLET ORAL EVERY 6 HOURS PRN
Qty: 28 TABLET | Refills: 0 | Status: SHIPPED | OUTPATIENT
Start: 2021-07-22 | End: 2023-01-10

## 2021-07-22 RX ORDER — OXYCODONE HYDROCHLORIDE 5 MG/1
5 TABLET ORAL
Status: DISCONTINUED | OUTPATIENT
Start: 2021-07-22 | End: 2021-07-22 | Stop reason: HOSPADM

## 2021-07-22 RX ORDER — FENTANYL CITRATE 50 UG/ML
25 INJECTION, SOLUTION INTRAMUSCULAR; INTRAVENOUS EVERY 5 MIN PRN
Status: COMPLETED | OUTPATIENT
Start: 2021-07-22 | End: 2021-07-22

## 2021-07-22 RX ORDER — ACETAMINOPHEN 10 MG/ML
INJECTION, SOLUTION INTRAVENOUS
Status: DISCONTINUED | OUTPATIENT
Start: 2021-07-22 | End: 2021-07-22

## 2021-07-22 RX ORDER — SODIUM CHLORIDE, SODIUM LACTATE, POTASSIUM CHLORIDE, CALCIUM CHLORIDE 600; 310; 30; 20 MG/100ML; MG/100ML; MG/100ML; MG/100ML
INJECTION, SOLUTION INTRAVENOUS CONTINUOUS
Status: DISCONTINUED | OUTPATIENT
Start: 2021-07-22 | End: 2021-07-22 | Stop reason: HOSPADM

## 2021-07-22 RX ORDER — HYDROMORPHONE HYDROCHLORIDE 2 MG/ML
0.2 INJECTION, SOLUTION INTRAMUSCULAR; INTRAVENOUS; SUBCUTANEOUS EVERY 5 MIN PRN
Status: DISCONTINUED | OUTPATIENT
Start: 2021-07-22 | End: 2021-07-22 | Stop reason: HOSPADM

## 2021-07-22 RX ORDER — LIDOCAINE HYDROCHLORIDE 10 MG/ML
INJECTION, SOLUTION EPIDURAL; INFILTRATION; INTRACAUDAL; PERINEURAL
Status: DISCONTINUED | OUTPATIENT
Start: 2021-07-22 | End: 2021-07-22 | Stop reason: HOSPADM

## 2021-07-22 RX ORDER — MIDAZOLAM HYDROCHLORIDE 1 MG/ML
INJECTION, SOLUTION INTRAMUSCULAR; INTRAVENOUS
Status: DISCONTINUED | OUTPATIENT
Start: 2021-07-22 | End: 2021-07-22

## 2021-07-22 RX ORDER — CEFAZOLIN SODIUM 2 G/50ML
2 SOLUTION INTRAVENOUS ONCE
Status: DISCONTINUED | OUTPATIENT
Start: 2021-07-22 | End: 2021-07-22 | Stop reason: HOSPADM

## 2021-07-22 RX ORDER — PROPOFOL 10 MG/ML
VIAL (ML) INTRAVENOUS CONTINUOUS PRN
Status: DISCONTINUED | OUTPATIENT
Start: 2021-07-22 | End: 2021-07-22

## 2021-07-22 RX ORDER — PROPOFOL 10 MG/ML
VIAL (ML) INTRAVENOUS
Status: DISCONTINUED | OUTPATIENT
Start: 2021-07-22 | End: 2021-07-22

## 2021-07-22 RX ADMIN — PROPOFOL 100 MCG/KG/MIN: 10 INJECTION, EMULSION INTRAVENOUS at 01:07

## 2021-07-22 RX ADMIN — FENTANYL CITRATE 25 MCG: 50 INJECTION INTRAMUSCULAR; INTRAVENOUS at 02:07

## 2021-07-22 RX ADMIN — SODIUM CHLORIDE, SODIUM LACTATE, POTASSIUM CHLORIDE, AND CALCIUM CHLORIDE: .6; .31; .03; .02 INJECTION, SOLUTION INTRAVENOUS at 12:07

## 2021-07-22 RX ADMIN — LIDOCAINE HYDROCHLORIDE 0.5 ML: 10 INJECTION, SOLUTION EPIDURAL; INFILTRATION; INTRACAUDAL; PERINEURAL at 12:07

## 2021-07-22 RX ADMIN — ACETAMINOPHEN 1000 MG: 10 INJECTION, SOLUTION INTRAVENOUS at 01:07

## 2021-07-22 RX ADMIN — OXYCODONE 5 MG: 5 TABLET ORAL at 02:07

## 2021-07-22 RX ADMIN — MIDAZOLAM HYDROCHLORIDE 2 MG: 1 INJECTION, SOLUTION INTRAMUSCULAR; INTRAVENOUS at 01:07

## 2021-07-22 RX ADMIN — LIDOCAINE HYDROCHLORIDE 100 MG: 20 INJECTION, SOLUTION INTRAVENOUS at 01:07

## 2021-07-22 RX ADMIN — PROPOFOL 50 MG: 10 INJECTION, EMULSION INTRAVENOUS at 01:07

## 2021-07-23 ENCOUNTER — TELEPHONE (OUTPATIENT)
Dept: PODIATRY | Facility: CLINIC | Age: 46
End: 2021-07-23

## 2021-07-28 ENCOUNTER — OFFICE VISIT (OUTPATIENT)
Dept: PODIATRY | Facility: CLINIC | Age: 46
End: 2021-07-28
Payer: OTHER GOVERNMENT

## 2021-07-28 VITALS — WEIGHT: 205.94 LBS | HEIGHT: 71 IN | BODY MASS INDEX: 28.83 KG/M2

## 2021-07-28 DIAGNOSIS — Z98.890 POSTOPERATIVE STATE: Primary | ICD-10-CM

## 2021-07-28 PROCEDURE — 99212 OFFICE O/P EST SF 10 MIN: CPT | Mod: PBBFAC,PN | Performed by: PODIATRIST

## 2021-07-28 PROCEDURE — 99024 POSTOP FOLLOW-UP VISIT: CPT | Mod: ,,, | Performed by: PODIATRIST

## 2021-07-28 PROCEDURE — 99999 PR PBB SHADOW E&M-EST. PATIENT-LVL II: CPT | Mod: PBBFAC,,, | Performed by: PODIATRIST

## 2021-07-28 PROCEDURE — 99999 PR PBB SHADOW E&M-EST. PATIENT-LVL II: ICD-10-PCS | Mod: PBBFAC,,, | Performed by: PODIATRIST

## 2021-07-28 PROCEDURE — 99024 PR POST-OP FOLLOW-UP VISIT: ICD-10-PCS | Mod: ,,, | Performed by: PODIATRIST

## 2021-08-03 LAB
FINAL PATHOLOGIC DIAGNOSIS: NORMAL
GROSS: NORMAL
Lab: NORMAL
MICROSCOPIC EXAM: NORMAL

## 2021-08-05 ENCOUNTER — PATIENT MESSAGE (OUTPATIENT)
Dept: PODIATRY | Facility: CLINIC | Age: 46
End: 2021-08-05

## 2021-09-01 ENCOUNTER — OFFICE VISIT (OUTPATIENT)
Dept: ORTHOPEDICS | Facility: CLINIC | Age: 46
End: 2021-09-01
Payer: OTHER GOVERNMENT

## 2021-09-01 VITALS — WEIGHT: 205 LBS | BODY MASS INDEX: 28.7 KG/M2 | HEIGHT: 71 IN

## 2021-09-01 DIAGNOSIS — M25.561 ACUTE PAIN OF RIGHT KNEE: Primary | ICD-10-CM

## 2021-09-01 DIAGNOSIS — M75.51 SUBACROMIAL BURSITIS OF RIGHT SHOULDER JOINT: ICD-10-CM

## 2021-09-01 DIAGNOSIS — M17.0 OSTEOARTHRITIS OF BOTH KNEES, UNSPECIFIED OSTEOARTHRITIS TYPE: ICD-10-CM

## 2021-09-01 PROCEDURE — 99214 OFFICE O/P EST MOD 30 MIN: CPT | Mod: S$PBB,,, | Performed by: PHYSICAL MEDICINE & REHABILITATION

## 2021-09-01 PROCEDURE — 99999 PR PBB SHADOW E&M-EST. PATIENT-LVL III: ICD-10-PCS | Mod: PBBFAC,,, | Performed by: PHYSICAL MEDICINE & REHABILITATION

## 2021-09-01 PROCEDURE — 99214 PR OFFICE/OUTPT VISIT, EST, LEVL IV, 30-39 MIN: ICD-10-PCS | Mod: S$PBB,,, | Performed by: PHYSICAL MEDICINE & REHABILITATION

## 2021-09-01 PROCEDURE — 99999 PR PBB SHADOW E&M-EST. PATIENT-LVL III: CPT | Mod: PBBFAC,,, | Performed by: PHYSICAL MEDICINE & REHABILITATION

## 2021-09-01 PROCEDURE — 99213 OFFICE O/P EST LOW 20 MIN: CPT | Mod: PBBFAC | Performed by: PHYSICAL MEDICINE & REHABILITATION

## 2021-09-01 RX ORDER — ALPRAZOLAM 0.5 MG/1
0.5 TABLET ORAL
COMMUNITY
Start: 2021-07-16 | End: 2023-01-10

## 2021-09-01 RX ORDER — ALPRAZOLAM 0.5 MG/1
TABLET ORAL
COMMUNITY
Start: 2021-07-16 | End: 2023-01-10 | Stop reason: SDUPTHER

## 2021-09-09 ENCOUNTER — PATIENT MESSAGE (OUTPATIENT)
Dept: PODIATRY | Facility: CLINIC | Age: 46
End: 2021-09-09

## 2023-01-10 ENCOUNTER — OFFICE VISIT (OUTPATIENT)
Dept: FAMILY MEDICINE | Facility: CLINIC | Age: 48
End: 2023-01-10
Payer: OTHER GOVERNMENT

## 2023-01-10 ENCOUNTER — LAB VISIT (OUTPATIENT)
Dept: LAB | Facility: HOSPITAL | Age: 48
End: 2023-01-10
Attending: STUDENT IN AN ORGANIZED HEALTH CARE EDUCATION/TRAINING PROGRAM
Payer: OTHER GOVERNMENT

## 2023-01-10 VITALS
OXYGEN SATURATION: 98 % | WEIGHT: 208.75 LBS | HEIGHT: 71 IN | SYSTOLIC BLOOD PRESSURE: 142 MMHG | DIASTOLIC BLOOD PRESSURE: 88 MMHG | BODY MASS INDEX: 29.23 KG/M2 | HEART RATE: 82 BPM

## 2023-01-10 DIAGNOSIS — F43.0 ANXIETY AS ACUTE REACTION TO EXCEPTIONAL STRESS: ICD-10-CM

## 2023-01-10 DIAGNOSIS — Z00.00 HEALTHCARE MAINTENANCE: Primary | ICD-10-CM

## 2023-01-10 DIAGNOSIS — I15.8 OTHER SECONDARY HYPERTENSION: ICD-10-CM

## 2023-01-10 DIAGNOSIS — G89.29 CHRONIC LOW BACK PAIN WITHOUT SCIATICA, UNSPECIFIED BACK PAIN LATERALITY: ICD-10-CM

## 2023-01-10 DIAGNOSIS — Z00.00 HEALTHCARE MAINTENANCE: ICD-10-CM

## 2023-01-10 DIAGNOSIS — M54.50 CHRONIC LOW BACK PAIN WITHOUT SCIATICA, UNSPECIFIED BACK PAIN LATERALITY: ICD-10-CM

## 2023-01-10 DIAGNOSIS — F41.1 ANXIETY AS ACUTE REACTION TO EXCEPTIONAL STRESS: ICD-10-CM

## 2023-01-10 DIAGNOSIS — F32.9 REACTIVE DEPRESSION: ICD-10-CM

## 2023-01-10 LAB
ALBUMIN SERPL BCP-MCNC: 4.4 G/DL (ref 3.5–5.2)
ALP SERPL-CCNC: 70 U/L (ref 55–135)
ALT SERPL W/O P-5'-P-CCNC: 21 U/L (ref 10–44)
ANION GAP SERPL CALC-SCNC: 9 MMOL/L (ref 8–16)
AST SERPL-CCNC: 27 U/L (ref 10–40)
BILIRUB SERPL-MCNC: 0.5 MG/DL (ref 0.1–1)
BUN SERPL-MCNC: 20 MG/DL (ref 6–20)
CALCIUM SERPL-MCNC: 9.7 MG/DL (ref 8.7–10.5)
CHLORIDE SERPL-SCNC: 108 MMOL/L (ref 95–110)
CHOLEST SERPL-MCNC: 216 MG/DL (ref 120–199)
CHOLEST/HDLC SERPL: 3.8 {RATIO} (ref 2–5)
CO2 SERPL-SCNC: 24 MMOL/L (ref 23–29)
CREAT SERPL-MCNC: 0.8 MG/DL (ref 0.5–1.4)
EST. GFR  (NO RACE VARIABLE): >60 ML/MIN/1.73 M^2
GLUCOSE SERPL-MCNC: 71 MG/DL (ref 70–110)
HCV AB SERPL QL IA: NORMAL
HDLC SERPL-MCNC: 57 MG/DL (ref 40–75)
HDLC SERPL: 26.4 % (ref 20–50)
LDLC SERPL CALC-MCNC: 124.8 MG/DL (ref 63–159)
NONHDLC SERPL-MCNC: 159 MG/DL
POTASSIUM SERPL-SCNC: 4.2 MMOL/L (ref 3.5–5.1)
PROT SERPL-MCNC: 7.5 G/DL (ref 6–8.4)
SODIUM SERPL-SCNC: 141 MMOL/L (ref 136–145)
TRIGL SERPL-MCNC: 171 MG/DL (ref 30–150)
TSH SERPL DL<=0.005 MIU/L-ACNC: 2.16 UIU/ML (ref 0.4–4)

## 2023-01-10 PROCEDURE — 80061 LIPID PANEL: CPT | Performed by: STUDENT IN AN ORGANIZED HEALTH CARE EDUCATION/TRAINING PROGRAM

## 2023-01-10 PROCEDURE — 80053 COMPREHEN METABOLIC PANEL: CPT | Performed by: STUDENT IN AN ORGANIZED HEALTH CARE EDUCATION/TRAINING PROGRAM

## 2023-01-10 PROCEDURE — 99999 PR PBB SHADOW E&M-EST. PATIENT-LVL III: CPT | Mod: PBBFAC,,, | Performed by: STUDENT IN AN ORGANIZED HEALTH CARE EDUCATION/TRAINING PROGRAM

## 2023-01-10 PROCEDURE — 99213 OFFICE O/P EST LOW 20 MIN: CPT | Mod: PBBFAC,PO | Performed by: STUDENT IN AN ORGANIZED HEALTH CARE EDUCATION/TRAINING PROGRAM

## 2023-01-10 PROCEDURE — 99999 PR PBB SHADOW E&M-EST. PATIENT-LVL III: ICD-10-PCS | Mod: PBBFAC,,, | Performed by: STUDENT IN AN ORGANIZED HEALTH CARE EDUCATION/TRAINING PROGRAM

## 2023-01-10 PROCEDURE — 99204 PR OFFICE/OUTPT VISIT, NEW, LEVL IV, 45-59 MIN: ICD-10-PCS | Mod: S$PBB,,, | Performed by: STUDENT IN AN ORGANIZED HEALTH CARE EDUCATION/TRAINING PROGRAM

## 2023-01-10 PROCEDURE — 84443 ASSAY THYROID STIM HORMONE: CPT | Performed by: STUDENT IN AN ORGANIZED HEALTH CARE EDUCATION/TRAINING PROGRAM

## 2023-01-10 PROCEDURE — 99204 OFFICE O/P NEW MOD 45 MIN: CPT | Mod: S$PBB,,, | Performed by: STUDENT IN AN ORGANIZED HEALTH CARE EDUCATION/TRAINING PROGRAM

## 2023-01-10 PROCEDURE — 86803 HEPATITIS C AB TEST: CPT | Performed by: STUDENT IN AN ORGANIZED HEALTH CARE EDUCATION/TRAINING PROGRAM

## 2023-01-10 PROCEDURE — 36415 COLL VENOUS BLD VENIPUNCTURE: CPT | Mod: PO | Performed by: STUDENT IN AN ORGANIZED HEALTH CARE EDUCATION/TRAINING PROGRAM

## 2023-01-10 RX ORDER — ALPRAZOLAM 0.5 MG/1
0.5 TABLET ORAL 2 TIMES DAILY PRN
Qty: 28 TABLET | Refills: 1 | Status: SHIPPED | OUTPATIENT
Start: 2023-01-10 | End: 2024-02-12 | Stop reason: SDUPTHER

## 2023-01-10 RX ORDER — DULOXETIN HYDROCHLORIDE 60 MG/1
60 CAPSULE, DELAYED RELEASE ORAL
COMMUNITY
Start: 2022-10-24 | End: 2023-01-10 | Stop reason: SDUPTHER

## 2023-01-10 RX ORDER — GABAPENTIN 600 MG/1
TABLET ORAL
COMMUNITY
Start: 2022-10-24 | End: 2023-01-10 | Stop reason: SDUPTHER

## 2023-01-10 RX ORDER — METHOCARBAMOL 500 MG/1
TABLET, FILM COATED ORAL
COMMUNITY
Start: 2022-10-24 | End: 2023-01-10 | Stop reason: SDUPTHER

## 2023-01-10 RX ORDER — DULOXETIN HYDROCHLORIDE 60 MG/1
60 CAPSULE, DELAYED RELEASE ORAL DAILY
Qty: 90 CAPSULE | Refills: 3 | Status: SHIPPED | OUTPATIENT
Start: 2023-01-10 | End: 2023-02-16 | Stop reason: SDUPTHER

## 2023-01-10 RX ORDER — GABAPENTIN 600 MG/1
600 TABLET ORAL 2 TIMES DAILY
Qty: 180 TABLET | Refills: 3 | Status: SHIPPED | OUTPATIENT
Start: 2023-01-10 | End: 2024-01-22 | Stop reason: SDUPTHER

## 2023-01-10 RX ORDER — METHOCARBAMOL 500 MG/1
500 TABLET, FILM COATED ORAL 3 TIMES DAILY PRN
Qty: 90 TABLET | Refills: 3 | Status: SHIPPED | OUTPATIENT
Start: 2023-01-10 | End: 2023-11-01

## 2023-01-10 NOTE — ASSESSMENT & PLAN NOTE
Poorly controlled. Will need time to process news. I will give him benzodiazepine but I counseled him that this is not intended as a long term solution to anxiety. We discussed the addictive nature of the medication. He understands and agrees.

## 2023-01-10 NOTE — PROGRESS NOTES
Name: Hi Salmeron  MRN: [id number]  : 1975    HPI  Patient is here to establish care. Primary concern is mood disorder. [place] and has had some issues with getting prescription medications transferred. He's been out of duloxetine for 3 days now. Additionally, his nephew  of leukemia last week and coping with the loss has been difficult. He has been short with his kids. Requesting benzodiazepine to help with this time of grieving - he has been on a benzodiazepine before.    Review of Systems   Constitutional:  Negative for unexpected weight change.   Gastrointestinal:  Negative for constipation, diarrhea, nausea and vomiting.      Patient Active Problem List   Diagnosis    Chronic neck pain    Neck pain    Anxiety    Sprain of lateral collateral ligament of right knee    Strain of right hamstring    Open wound of plantar aspect of foot    Chronic pain of right knee    Pre-operative general physical examination    Chronic low back pain       Current Outpatient Medications on File Prior to Visit   Medication Sig Dispense Refill    multivitamin (THERAGRAN) per tablet Take 1 tablet by mouth once daily.      [DISCONTINUED] ALPRAZolam (XANAX) 0.5 MG tablet Take 0.5 mg by mouth as needed.      [DISCONTINUED] DULoxetine (CYMBALTA) 60 MG capsule Take 60 mg by mouth.      [DISCONTINUED] gabapentin (NEURONTIN) 600 MG tablet Take by mouth.      [DISCONTINUED] methocarbamoL (ROBAXIN) 500 MG Tab Take by mouth.      [DISCONTINUED] ALPRAZolam (XANAX) 0.5 MG tablet       [DISCONTINUED] HYDROcodone-acetaminophen (NORCO) 5-325 mg per tablet Take 1 tablet by mouth every 6 (six) hours as needed for Pain. (Patient not taking: Reported on 2021) 28 tablet 0     No current facility-administered medications on file prior to visit.       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        Past Surgical History:   Procedure Laterality Date    BICEPS TENDON  REPAIR      FOOT MASS EXCISION Right 07/22/2021    Procedure: EXCISION, MASS, FOOT;  Surgeon: Mamadou Bernal DPM;  Location: [facility] OR;  Service: Podiatry;  Laterality: Right;    UNDESCENDED TESTICLE EXPLORATION Left         Family History   Problem Relation Age of Onset    Breast cancer Mother 37    Hypertension Father     Heart attack Father     Leukemia Other         Nephew    Throat cancer Maternal Uncle     Blindness Neg Hx     Cataracts Neg Hx     Diabetes Neg Hx     Glaucoma Neg Hx     Macular degeneration Neg Hx     Retinal detachment Neg Hx     Strabismus Neg Hx     Stroke Neg Hx     Thyroid disease Neg Hx        Social History     Socioeconomic History    Marital status:    Occupational History     Employer: [employer]   Tobacco Use    Smoking status: Former    Smokeless tobacco: Never    Tobacco comments:     stopped [id number]   Substance and Sexual Activity    Alcohol use: Yes     Comment: seldom    Drug use: No   Social History Narrative    Lives with wife and two children.        Review of patient's allergies indicates:  No Known Allergies     Vitals:    01/10/23 1457   BP: (!) 142/88   Pulse: 82        Physical Exam  Constitutional:       General: He is not in acute distress.     Appearance: Normal appearance. He is well-developed.   HENT:      Head: Normocephalic and atraumatic.      Right Ear: External ear normal.      Left Ear: External ear normal.   Eyes:      Conjunctiva/sclera: Conjunctivae normal.   Cardiovascular:      Rate and Rhythm: Normal rate and regular rhythm.      Heart sounds: No murmur heard.    No friction rub. No gallop.   Pulmonary:      Effort: Pulmonary effort is normal. No respiratory distress.      Breath sounds: No wheezing, rhonchi or rales.   Abdominal:      General: Abdomen is flat. There is no distension.   Musculoskeletal:         General: No swelling or deformity.      Right lower leg: No edema.      Left lower leg: No edema.   Skin:      General: Skin is warm and dry.      Coloration: Skin is not jaundiced.   Neurological:      Mental Status: He is alert and oriented to person, place, and time. Mental status is at baseline.   Psychiatric:         Attention and Perception: Attention and perception normal.         Mood and Affect: Mood normal.         Speech: Speech normal.         Behavior: Behavior normal. Behavior is cooperative.         Thought Content: Thought content normal.         Cognition and Memory: Cognition normal.         Judgment: Judgment normal.        1. Healthcare maintenance  -     Hepatitis C Antibody; Future; Expected date: 01/10/2023    2. Chronic low back pain without sciatica, unspecified back pain laterality  -     gabapentin (NEURONTIN) 600 MG tablet; Take 1 tablet (600 mg total) by mouth 2 (two) times daily.  Dispense: 180 tablet; Refill: 3  -     methocarbamoL (ROBAXIN) 500 MG Tab; Take 1 tablet (500 mg total) by mouth 3 (three) times daily as needed (pain).  Dispense: 90 tablet; Refill: 3    3. Other secondary hypertension  Assessment & Plan:  Likely related to recent major life stressors. Evaluate at next visit.    Orders:  -     TSH; Future; Expected date: 01/10/2023  -     COMPREHENSIVE METABOLIC PANEL; Future; Expected date: 01/10/2023  -     Lipid Panel; Future; Expected date: 01/10/2023    4. Reactive depression  Assessment & Plan:  Deteriorated. Restart duloxetine.    Orders:  -     DULoxetine (CYMBALTA) 60 MG capsule; Take 1 capsule (60 mg total) by mouth once daily.  Dispense: 90 capsule; Refill: 3    5. Anxiety as acute reaction to exceptional stress  Assessment & Plan:  Poorly controlled. Will need time to process news. I will give him benzodiazepine but I counseled him that this is not intended as a long term solution to anxiety. We discussed the addictive nature of the medication. He understands and agrees.    Orders:  -     ALPRAZolam (XANAX) 0.5 MG tablet; Take 1 tablet (0.5 mg total) by mouth 2 (two) times  daily as needed for Anxiety.  Dispense: 28 tablet; Refill: 1        Follow up in 3 months    Shukri Flores MD  01/10/2023

## 2023-01-18 ENCOUNTER — PATIENT MESSAGE (OUTPATIENT)
Dept: ADMINISTRATIVE | Facility: HOSPITAL | Age: 48
End: 2023-01-18
Payer: OTHER GOVERNMENT

## 2023-01-25 ENCOUNTER — PATIENT MESSAGE (OUTPATIENT)
Dept: ADMINISTRATIVE | Facility: HOSPITAL | Age: 48
End: 2023-01-25
Payer: OTHER GOVERNMENT

## 2023-01-30 ENCOUNTER — PATIENT MESSAGE (OUTPATIENT)
Dept: FAMILY MEDICINE | Facility: CLINIC | Age: 48
End: 2023-01-30
Payer: OTHER GOVERNMENT

## 2023-02-07 ENCOUNTER — PATIENT MESSAGE (OUTPATIENT)
Dept: ADMINISTRATIVE | Facility: HOSPITAL | Age: 48
End: 2023-02-07
Payer: OTHER GOVERNMENT

## 2023-02-10 ENCOUNTER — PATIENT MESSAGE (OUTPATIENT)
Dept: ADMINISTRATIVE | Facility: HOSPITAL | Age: 48
End: 2023-02-10
Payer: OTHER GOVERNMENT

## 2023-02-10 DIAGNOSIS — Z12.11 SCREENING FOR COLON CANCER: ICD-10-CM

## 2023-02-16 ENCOUNTER — HOSPITAL ENCOUNTER (OUTPATIENT)
Dept: RADIOLOGY | Facility: HOSPITAL | Age: 48
Discharge: HOME | End: 2023-02-16
Attending: STUDENT IN AN ORGANIZED HEALTH CARE EDUCATION/TRAINING PROGRAM
Payer: OTHER GOVERNMENT

## 2023-02-16 ENCOUNTER — OFFICE VISIT (OUTPATIENT)
Dept: FAMILY MEDICINE | Facility: CLINIC | Age: 48
End: 2023-02-16
Payer: OTHER GOVERNMENT

## 2023-02-16 VITALS
BODY MASS INDEX: 28.98 KG/M2 | HEART RATE: 98 BPM | OXYGEN SATURATION: 95 % | TEMPERATURE: 99 F | SYSTOLIC BLOOD PRESSURE: 120 MMHG | DIASTOLIC BLOOD PRESSURE: 80 MMHG | HEIGHT: 71 IN | WEIGHT: 207 LBS

## 2023-02-16 DIAGNOSIS — M25.541 JOINT PAIN IN FINGERS OF RIGHT HAND: ICD-10-CM

## 2023-02-16 DIAGNOSIS — M25.541 JOINT PAIN IN FINGERS OF RIGHT HAND: Primary | ICD-10-CM

## 2023-02-16 DIAGNOSIS — F32.9 REACTIVE DEPRESSION: ICD-10-CM

## 2023-02-16 PROCEDURE — 99999 PR PBB SHADOW E&M-EST. PATIENT-LVL III: CPT | Mod: PBBFAC,,, | Performed by: STUDENT IN AN ORGANIZED HEALTH CARE EDUCATION/TRAINING PROGRAM

## 2023-02-16 PROCEDURE — 73130 X-RAY EXAM OF HAND: CPT | Mod: 26,RT,, | Performed by: RADIOLOGY

## 2023-02-16 PROCEDURE — 99999 PR PBB SHADOW E&M-EST. PATIENT-LVL III: ICD-10-PCS | Mod: PBBFAC,,, | Performed by: STUDENT IN AN ORGANIZED HEALTH CARE EDUCATION/TRAINING PROGRAM

## 2023-02-16 PROCEDURE — 73130 X-RAY EXAM OF HAND: CPT | Mod: TC,FY,PO,RT

## 2023-02-16 PROCEDURE — 73130 XR HAND COMPLETE 3 VIEW RIGHT: ICD-10-PCS | Mod: 26,RT,, | Performed by: RADIOLOGY

## 2023-02-16 PROCEDURE — 99213 OFFICE O/P EST LOW 20 MIN: CPT | Mod: S$PBB,,, | Performed by: STUDENT IN AN ORGANIZED HEALTH CARE EDUCATION/TRAINING PROGRAM

## 2023-02-16 PROCEDURE — 99213 OFFICE O/P EST LOW 20 MIN: CPT | Mod: PBBFAC,PO | Performed by: STUDENT IN AN ORGANIZED HEALTH CARE EDUCATION/TRAINING PROGRAM

## 2023-02-16 PROCEDURE — 99213 PR OFFICE/OUTPT VISIT, EST, LEVL III, 20-29 MIN: ICD-10-PCS | Mod: S$PBB,,, | Performed by: STUDENT IN AN ORGANIZED HEALTH CARE EDUCATION/TRAINING PROGRAM

## 2023-02-16 RX ORDER — DULOXETIN HYDROCHLORIDE 30 MG/1
60 CAPSULE, DELAYED RELEASE ORAL 2 TIMES DAILY
Qty: 60 CAPSULE | Refills: 11 | Status: SHIPPED | OUTPATIENT
Start: 2023-02-16 | End: 2023-11-01 | Stop reason: SDUPTHER

## 2023-02-16 RX ORDER — DICLOFENAC SODIUM 10 MG/G
2 GEL TOPICAL 3 TIMES DAILY PRN
Qty: 20 G | Refills: 3 | Status: SHIPPED | OUTPATIENT
Start: 2023-02-16

## 2023-02-16 NOTE — ASSESSMENT & PLAN NOTE
Patient requested splitting duloxetine into 30mg BID instead of 60mg daily. Change sent to pharmacy. We discussed weaning the medication in the future and the nature of anti-depressant withdrawal.

## 2023-02-16 NOTE — PROGRESS NOTES
Name: Hi Salmeron  MRN: [id number]  : 1975  PCP: Shukri Flores MD    HPI  Patient is here today with finger pain. Progressively worsening over the last few months. He's having pain specifically in his right 3rd and 4th digits around CMP and PIP joints. He'll get stiffness and ache. The pain will wake him at night. No interventions tried. No swelling.     Review of Systems   Constitutional:  Negative for activity change and unexpected weight change.   HENT:  Negative for hearing loss, rhinorrhea and trouble swallowing.    Eyes:  Negative for discharge and visual disturbance.   Respiratory:  Negative for chest tightness and wheezing.    Cardiovascular:  Negative for chest pain and palpitations.   Gastrointestinal:  Negative for blood in stool, constipation, diarrhea and vomiting.   Endocrine: Negative for polydipsia and polyuria.   Genitourinary:  Negative for difficulty urinating, hematuria and urgency.   Musculoskeletal:  Positive for arthralgias. Negative for joint swelling and neck pain.   Skin:  Negative for color change and rash.   Neurological:  Negative for weakness, numbness and headaches.   Psychiatric/Behavioral:  Negative for confusion and dysphoric mood.      Patient Active Problem List   Diagnosis    Chronic neck pain    Neck pain    Anxiety as acute reaction to exceptional stress    Sprain of lateral collateral ligament of right knee    Strain of right hamstring    Open wound of plantar aspect of foot    Chronic pain of right knee    Healthcare maintenance    Chronic low back pain    Other secondary hypertension    Reactive depression       Vitals:    23 1423   BP: 120/80   Pulse: 98   Temp: 98.5 °F (36.9 °C)       Physical Exam  Musculoskeletal:      Right hand: No swelling, deformity or tenderness. Normal strength. Normal pulse.       1. Joint pain in fingers of right hand  Assessment & Plan:  A new chronic issue. Orders as listed.    Orders:  -     X-Ray Hand Complete Right; Future;  Expected date: 02/16/2023  -     diclofenac sodium (VOLTAREN) 1 % Gel; Apply 2 g topically 3 (three) times daily as needed (joint pain).  Dispense: 20 g; Refill: 3    2. Reactive depression  Assessment & Plan:  Patient requested splitting duloxetine into 30mg BID instead of 60mg daily. Change sent to pharmacy. We discussed weaning the medication in the future and the nature of anti-depressant withdrawal.    Orders:  -     DULoxetine (CYMBALTA) 30 MG capsule; Take 2 capsules (60 mg total) by mouth 2 (two) times daily.  Dispense: 60 capsule; Refill: 11        Follow up as previously scheduled.    Shukri Flores MD  02/16/2023

## 2023-02-17 DIAGNOSIS — M25.541 JOINT PAIN IN FINGERS OF RIGHT HAND: Primary | ICD-10-CM

## 2023-02-27 ENCOUNTER — PATIENT MESSAGE (OUTPATIENT)
Dept: ORTHOPEDICS | Facility: CLINIC | Age: 48
End: 2023-02-27
Payer: OTHER GOVERNMENT

## 2023-02-27 ENCOUNTER — TELEPHONE (OUTPATIENT)
Dept: ORTHOPEDICS | Facility: CLINIC | Age: 48
End: 2023-02-27
Payer: OTHER GOVERNMENT

## 2023-02-27 NOTE — TELEPHONE ENCOUNTER
Returned call to pt, left M informing pt he can be seen sooner, return call back to clinic if needed.

## 2023-02-27 NOTE — TELEPHONE ENCOUNTER
----- Message from Darline Davey sent at 2/27/2023 10:37 AM CST -----  Regarding: earlier appt  If at possible can the pt get a sooner appt.    Thanks for checking. I Know Job went home sick today

## 2023-03-06 ENCOUNTER — OFFICE VISIT (OUTPATIENT)
Dept: ORTHOPEDICS | Facility: CLINIC | Age: 48
End: 2023-03-06
Payer: OTHER GOVERNMENT

## 2023-03-06 DIAGNOSIS — M65.341 TRIGGER RING FINGER OF RIGHT HAND: Primary | ICD-10-CM

## 2023-03-06 DIAGNOSIS — M65.331 TRIGGER MIDDLE FINGER OF RIGHT HAND: ICD-10-CM

## 2023-03-06 PROCEDURE — 20550 NJX 1 TENDON SHEATH/LIGAMENT: CPT | Mod: PBBFAC,PN,RT | Performed by: PHYSICIAN ASSISTANT

## 2023-03-06 PROCEDURE — 20550 TENDON SHEATH: ICD-10-PCS | Mod: S$PBB,RT,, | Performed by: PHYSICIAN ASSISTANT

## 2023-03-06 PROCEDURE — 99999 PR PBB SHADOW E&M-EST. PATIENT-LVL III: CPT | Mod: PBBFAC,,, | Performed by: PHYSICIAN ASSISTANT

## 2023-03-06 PROCEDURE — 20550 NJX 1 TENDON SHEATH/LIGAMENT: CPT | Mod: S$PBB,RT,, | Performed by: PHYSICIAN ASSISTANT

## 2023-03-06 PROCEDURE — 99204 OFFICE O/P NEW MOD 45 MIN: CPT | Mod: S$PBB,25,, | Performed by: PHYSICIAN ASSISTANT

## 2023-03-06 PROCEDURE — 99999 PR PBB SHADOW E&M-EST. PATIENT-LVL III: ICD-10-PCS | Mod: PBBFAC,,, | Performed by: PHYSICIAN ASSISTANT

## 2023-03-06 PROCEDURE — 99213 OFFICE O/P EST LOW 20 MIN: CPT | Mod: PBBFAC,PN | Performed by: PHYSICIAN ASSISTANT

## 2023-03-06 PROCEDURE — 99204 PR OFFICE/OUTPT VISIT, NEW, LEVL IV, 45-59 MIN: ICD-10-PCS | Mod: S$PBB,25,, | Performed by: PHYSICIAN ASSISTANT

## 2023-03-06 RX ORDER — TRIAMCINOLONE ACETONIDE 40 MG/ML
40 INJECTION, SUSPENSION INTRA-ARTICULAR; INTRAMUSCULAR
Status: DISCONTINUED | OUTPATIENT
Start: 2023-03-06 | End: 2023-03-06 | Stop reason: HOSPADM

## 2023-03-06 RX ADMIN — TRIAMCINOLONE ACETONIDE 40 MG: 40 INJECTION, SUSPENSION INTRA-ARTICULAR; INTRAMUSCULAR at 09:03

## 2023-03-06 NOTE — PROCEDURES
Tendon Sheath    Date/Time: 3/6/2023 9:00 AM  Performed by: Job Zapien PA-C  Authorized by: Job Zapien PA-C     Consent Done?:  Yes (Verbal)  Indications:  Pain  Site marked: the procedure site was marked    Timeout: prior to procedure the correct patient, procedure, and site was verified    Prep: patient was prepped and draped in usual sterile fashion      Local anesthesia used?: Yes    Local anesthetic:  Lidocaine 1% without epinephrine  Anesthetic total (ml):  0.5    Location:  Ring finger  Site:  R ring flexor tendon sheath  Ultrasonic guidance for needle placement?: No    Needle size:  25 G  Approach:  Volar  Medications:  40 mg triamcinolone acetonide 40 mg/mL (20 mg injected)  Patient tolerance:  Patient tolerated the procedure well with no immediate complications

## 2023-03-06 NOTE — PROGRESS NOTES
3/6/2023    Chief Complaint:  Chief Complaint   Patient presents with    Right Hand - Pain        HPI:  Hi Salmeron is a 47 y.o. male who presents to clinic today for evaluation of his right hand finger pain and locking.  States for the past 5-6 months he is noticed pain and locking of the right middle and right ring fingers that has progressively worsened in that time.  States symptoms are worse at night, and will wake him at night.  States he is fingers will lock in flexion and we will pop back into extension when he tries to extend the fingers.  States pain on average is 7/10.  States pain is worse with activity.  Additionally, states he has a history of shoulder injury and pain that occurred approximally 2 years ago.  Denies any acute injuries involving the right hand.  Denies any paresthesias.  Denies being a diabetic.  Denies any other complaints at this time.    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        PSHX:  Past Surgical History:   Procedure Laterality Date    BICEPS TENDON REPAIR      FOOT MASS EXCISION Right 2021    Procedure: EXCISION, MASS, FOOT;  Surgeon: Mamadou Bernal DPM;  Location: [facility] OR;  Service: Podiatry;  Laterality: Right;    UNDESCENDED TESTICLE EXPLORATION Left        FMHX:  Family History   Problem Relation Age of Onset    Breast cancer Mother 37    Cancer Mother     Hypertension Father     Heart attack Father     Leukemia Other         Nephew    Throat cancer Maternal Uncle     Blindness Neg Hx     Cataracts Neg Hx     Diabetes Neg Hx     Glaucoma Neg Hx     Macular degeneration Neg Hx     Retinal detachment Neg Hx     Strabismus Neg Hx     Stroke Neg Hx     Thyroid disease Neg Hx        SOCHX:  Social History     Tobacco Use    Smoking status: Former     Packs/day: 0.25     Years: 10.00     Pack years: 2.50     Types: Cigarettes     Start date: 1995     Quit date: 2005     Years since quittin.1     Smokeless tobacco: Never   Substance Use Topics    Alcohol use: Not Currently     Comment: At max, twice a kay but usually once every few months       ALLERGIES:  Patient has no known allergies.    CURRENT MEDICATIONS:  Current Outpatient Medications on File Prior to Visit   Medication Sig Dispense Refill    ALPRAZolam (XANAX) 0.5 MG tablet Take 1 tablet (0.5 mg total) by mouth 2 (two) times daily as needed for Anxiety. 28 tablet 1    diclofenac sodium (VOLTAREN) 1 % Gel Apply 2 g topically 3 (three) times daily as needed (joint pain). 20 g 3    DULoxetine (CYMBALTA) 30 MG capsule Take 2 capsules (60 mg total) by mouth 2 (two) times daily. 60 capsule 11    gabapentin (NEURONTIN) 600 MG tablet Take 1 tablet (600 mg total) by mouth 2 (two) times daily. 180 tablet 3    methocarbamoL (ROBAXIN) 500 MG Tab Take 1 tablet (500 mg total) by mouth 3 (three) times daily as needed (pain). 90 tablet 3    multivitamin (THERAGRAN) per tablet Take 1 tablet by mouth once daily.       No current facility-administered medications on file prior to visit.       REVIEW OF SYSTEMS:  Review of Systems   Constitutional:  Negative for diaphoresis and fever.   HENT:  Positive for tinnitus. Negative for ear pain, hearing loss and nosebleeds.    Eyes:  Negative for pain and redness.   Respiratory:  Negative for cough and shortness of breath.    Cardiovascular:  Negative for chest pain and palpitations.   Gastrointestinal:  Negative for blood in stool, constipation, diarrhea, nausea and vomiting.   Genitourinary:  Negative for frequency and hematuria.   Musculoskeletal:  Positive for back pain. Negative for myalgias.   Skin:  Negative for itching and rash.   Neurological:  Negative for dizziness, tingling, seizures, weakness and headaches.   Endo/Heme/Allergies:  Negative for environmental allergies.   Psychiatric/Behavioral:  The patient is not nervous/anxious.      Review of Systems Compete; Negative, unless noted above.    GENERAL PHYSICAL  EXAM:   There were no vitals taken for this visit.   GEN: well developed, well nourished, no acute distress   HENT: Normocephalic, atraumatic   EYES: No discharge, conjunctiva normal   PULM: No wheezing, no respiratory distress   CV: Regular rate and rhythm     ORTHO EXAM:   Examination of the right hand reveals no edema, erythema, ecchymosis, or skin breakdown.  Able make composite fist and fully extend all fingers.  Very mild triggering noted of the right middle and right ring fingers with palpation.  Tenderness to palpation overlying the area of the A1 pulley of the right middle and right ring fingers.  No tenderness or triggering noted over the areas of the A1 pulleys the remaining fingers.  5/5 /intrinsic strength.  Normal sensation in the radial, ulnar, median nerve distributions.  Capillary refill less than 2 seconds.    RADIOLOGY:   None.    ASSESSMENT:   Right middle finger trigger finger, right ring finger trigger finger    PLAN:  1. I discussed with Hi Salmeron the trigger finger pathology and treatment options in detail during today's visit.  After treatment options were discussed, we decided the best course of action this time is to perform steroid injections into the flexor tendon sheath at the levels of the A1 pulleys of the right middle finger and right ring finger in clinic today.  We discussed for his shoulder pain/injury we would refer him to Dr. Rabago for further evaluation.  He verbally agreed with the treatment plan.      2. Informed consent was obtained.  Attention was 1st turned to the right ring finger.  After an alcohol prep followed by a chlorhexidine prep, a steroid injection was placed into the flexor tendon sheath at the level A1 pulley of the right ring finger.  He tolerated the procedure well with no immediate complications.  Attention was then turned to the right middle finger.  After an alcohol prep followed by a chlorhexidine prep, a steroid injection was placed into the  flexor tendon sheath at the level of the A1 pulley of the right middle finger.  Tolerated the procedure with no immediate complications.    3.  I would like him follow-up in clinic on a p.r.n. basis for any worsening of his symptoms or for any hand, wrist, or elbow problems/concerns.  He was instructed to contact the clinic for any problems or concerns in the interim.    Answers submitted by the patient for this visit:  Orthopedics Questionnaire (Submitted on 3/6/2023)  unexpected weight change: No  appetite change : No  sleep disturbance: No  IMMUNOCOMPROMISED: No  dysphoric mood: No  visual disturbance: No  sinus pressure : No  food allergies: No  difficulty urinating: No  numbness: No  joint swelling: No   (Submitted on 3/6/2023)  Chief Complaint: Hand injury  Pain Chronicity: recurrent  History of trauma: No  Onset: more than 1 month ago  Frequency: every several days  Progression since onset: unchanged  injury location: at home  pain- numeric: 4/10  pain location: right fingers  pain quality: aching, tightness, throbbing  Radiating Pain: No  Aggravating factors: lying down  inability to bear weight: Yes  joint locking: Yes  limited range of motion: Yes  stiffness: Yes  Treatments tried: other  physical therapy: not tried  Improvement on treatment: mild

## 2023-03-06 NOTE — PROCEDURES
Tendon Sheath    Date/Time: 3/6/2023 9:00 AM  Performed by: Job Zapien PA-C  Authorized by: Job Zapien PA-C     Consent Done?:  Yes (Verbal)  Indications:  Pain  Site marked: the procedure site was marked    Timeout: prior to procedure the correct patient, procedure, and site was verified    Prep: patient was prepped and draped in usual sterile fashion      Local anesthesia used?: Yes    Local anesthetic:  Lidocaine 1% without epinephrine  Anesthetic total (ml):  0.5    Location:  Long finger  Site:  R long flexor tendon sheath  Ultrasonic guidance for needle placement?: No    Needle size:  25 G  Approach:  Volar  Medications:  40 mg triamcinolone acetonide 40 mg/mL (20 mg injected)  Patient tolerance:  Patient tolerated the procedure well with no immediate complications

## 2023-03-10 ENCOUNTER — TELEPHONE (OUTPATIENT)
Dept: FAMILY MEDICINE | Facility: CLINIC | Age: 48
End: 2023-03-10
Payer: OTHER GOVERNMENT

## 2023-03-10 NOTE — TELEPHONE ENCOUNTER
Called Patient to reschedule 04/10/23 appointment with Dr. Flores. Left voicemail with clinic callback as well as sent portal message.

## 2023-04-04 DIAGNOSIS — M25.511 RIGHT SHOULDER PAIN: Primary | ICD-10-CM

## 2023-04-05 ENCOUNTER — OFFICE VISIT (OUTPATIENT)
Dept: ORTHOPEDICS | Facility: CLINIC | Age: 48
End: 2023-04-05
Payer: OTHER GOVERNMENT

## 2023-04-05 ENCOUNTER — HOSPITAL ENCOUNTER (OUTPATIENT)
Dept: RADIOLOGY | Facility: HOSPITAL | Age: 48
Discharge: HOME | End: 2023-04-05
Attending: ORTHOPAEDIC SURGERY
Payer: OTHER GOVERNMENT

## 2023-04-05 VITALS — WEIGHT: 207 LBS | HEIGHT: 71 IN | BODY MASS INDEX: 28.98 KG/M2

## 2023-04-05 DIAGNOSIS — G89.29 CHRONIC RIGHT SHOULDER PAIN: Primary | ICD-10-CM

## 2023-04-05 DIAGNOSIS — M75.21 BICEPS TENDONITIS ON RIGHT: Primary | ICD-10-CM

## 2023-04-05 DIAGNOSIS — M19.011 ARTHRITIS OF RIGHT SHOULDER REGION: ICD-10-CM

## 2023-04-05 DIAGNOSIS — M25.511 CHRONIC RIGHT SHOULDER PAIN: Primary | ICD-10-CM

## 2023-04-05 DIAGNOSIS — M75.21 BICEPS TENDONITIS ON RIGHT: ICD-10-CM

## 2023-04-05 DIAGNOSIS — M25.511 RIGHT SHOULDER PAIN: ICD-10-CM

## 2023-04-05 PROCEDURE — 99213 PR OFFICE/OUTPT VISIT, EST, LEVL III, 20-29 MIN: ICD-10-PCS | Mod: S$PBB,,, | Performed by: ORTHOPAEDIC SURGERY

## 2023-04-05 PROCEDURE — 99213 OFFICE O/P EST LOW 20 MIN: CPT | Mod: S$PBB,,, | Performed by: ORTHOPAEDIC SURGERY

## 2023-04-05 PROCEDURE — 73030 XR SHOULDER TRAUMA 3 VIEW RIGHT: ICD-10-PCS | Mod: 26,RT,, | Performed by: RADIOLOGY

## 2023-04-05 PROCEDURE — 73030 X-RAY EXAM OF SHOULDER: CPT | Mod: TC,PO,RT

## 2023-04-05 PROCEDURE — 73030 X-RAY EXAM OF SHOULDER: CPT | Mod: 26,RT,, | Performed by: RADIOLOGY

## 2023-04-05 PROCEDURE — 99213 OFFICE O/P EST LOW 20 MIN: CPT | Mod: PBBFAC,PN | Performed by: ORTHOPAEDIC SURGERY

## 2023-04-05 PROCEDURE — 99999 PR PBB SHADOW E&M-EST. PATIENT-LVL III: CPT | Mod: PBBFAC,,, | Performed by: ORTHOPAEDIC SURGERY

## 2023-04-05 PROCEDURE — 99999 PR PBB SHADOW E&M-EST. PATIENT-LVL III: ICD-10-PCS | Mod: PBBFAC,,, | Performed by: ORTHOPAEDIC SURGERY

## 2023-04-05 NOTE — PROGRESS NOTES
47 years old right shoulder pain for about 2 years time.  Feels that it was due to overuse in the .  Patient reports that he has been to physical therapy in the past with partial relief.  He had visited with other doctors in the past who did give him the option of biceps tenodesis    Exam shows full motion good strength no instability or signs infection    Imaging including MRI reveals partial rotator cuff tear, biceps tendinosis, glenohumeral and acromioclavicular arthrosis     Assessment:  Right shoulder arthritis and tendinitis     Plan:  Physical therapy, follow up as needed

## 2023-04-11 LAB — HEMOCCULT STL QL IA: NORMAL

## 2023-04-17 PROBLEM — Z00.00 HEALTHCARE MAINTENANCE: Status: RESOLVED | Noted: 2021-07-13 | Resolved: 2023-04-17

## 2023-06-28 ENCOUNTER — PATIENT OUTREACH (OUTPATIENT)
Dept: ADMINISTRATIVE | Facility: HOSPITAL | Age: 48
End: 2023-06-28
Payer: OTHER GOVERNMENT

## 2023-06-28 NOTE — PROGRESS NOTES
Population Health Chart Review & Patient Outreach Details:     Reason for Outreach Encounter:     [x]  Non-Compliant Report   []  Payor Report ([employer], PHN, BCBS, MSSP, MCIP, UHC, etc.)   []  Pre-Visit Chart Review     Updates Requested / Reviewed:     []  Care Everywhere    []     []  External Sources ([facility], [facility], DIS, etc.)   []  Care Team Updated    Patient Outreach Method:    [x]  Telephone Outreach Completed   [x] Successful   [] Left Voicemail   [] Unable to Contact (wrong number, no voicemail)  []  [facility] Portal Outreach Sent  []  Letter Outreach Mailed  []  Fax Sent for External Records  []  External Records Upload    Health Maintenance Topics Addressed and Outreach Outcomes / Actions Taken:        []      Breast Cancer Screening []  Mammo Scheduled      []  External Records Requested     []  Added Reminder to Complete to Upcoming Primary Care Appt Notes     []  Patient Declined     []  Patient Will Call Back to Schedule     []  Patient Will Schedule with External Provider / Order Routed if Applicable             []       Cervical Cancer Screening []  Pap Scheduled      []  External Records Requested     []  Added Reminder to Complete to Upcoming Primary Care Appt Notes     []  Patient Declined     []  Patient Will Call Back to Schedule     []  Patient Will Schedule with External Provider               [x]          Colorectal Cancer Screening []  Colonoscopy Case Request or Referral Placed     []  External Records Requested     []  Added Reminder to Complete to Upcoming Primary Care Appt Notes     []  Patient Declined     []  Patient Will Call Back to Schedule     []  Patient Will Schedule with External Provider     [x]  Fit Kit Mailed (add the SmartPhrase under additional notes)     []  Reminded Patient to Complete Home Test             []      Diabetic Eye Exam []  Eye Camera Scheduled or Optometry Referral Placed     []  External Records Requested     []  Added Reminder to Complete  to Upcoming Primary Care Appt Notes     []  Patient Declined     []  Patient Will Call Back to Schedule     []  Patient Will Schedule with External Provider             []      Blood Pressure Control []  Primary Care Follow Up Visit Scheduled     []  Remote Blood Pressure Reading Captured     []  Added Reminder to Complete to Upcoming Primary Care Appt Notes     []  Patient Declined     []  Patient Will Call Back / Patient Will Send Portal Message with Reading     []  Patient Will Call Back to Schedule Provider Visit             []       HbA1c & Other Labs []  Lab Appt Scheduled for Due Labs     []  Primary Care Follow Up Visit Scheduled      []  Reminded Patient to Complete Home Test     []  Added Reminder to Complete to Upcoming Primary Care Appt Notes     []  Patient Declined     []  Patient Will Call Back to Schedule     []  Patient Will Schedule with External Provider / Order Routed if Applicable           []    Schedule Primary Care Appt []  Primary Care Appt Scheduled     []  Patient Declined     []  Patient Will Call Back to Schedule     []  Pt Established with External Provider & Updated Care Team             []      Medication Adherence []  Primary Care Appointment Scheduled     []  Added Reminder to Upcoming Primary Care Appt Notes     []  Patient Reminded to  Prescription     []  Patient Declined, Provider Notified if Needed     []  Sent Provider Message to Review and/or Add Exclusion to Problem List             []      Osteoporosis Screening []  DXA Appointment Scheduled     []  External Records Requested     []  Added Reminder to Complete to Upcoming Primary Care Appt Notes     []  Patient Declined     []  Patient Will Call Back to Schedule     []  Patient Will Schedule with External Provider / Order Routed if Applicable     Additional Care Coordinator Notes:     FitKit was given to patient on 6/28/2023 10:56 AM        Further Action Needed If Patient Returns Outreach:

## 2023-06-30 ENCOUNTER — PATIENT OUTREACH (OUTPATIENT)
Dept: ADMINISTRATIVE | Facility: HOSPITAL | Age: 48
End: 2023-06-30
Payer: OTHER GOVERNMENT

## 2023-06-30 DIAGNOSIS — Z12.11 COLON CANCER SCREENING: Primary | ICD-10-CM

## 2023-08-01 ENCOUNTER — LAB VISIT (OUTPATIENT)
Dept: LAB | Facility: HOSPITAL | Age: 48
End: 2023-08-01
Payer: OTHER GOVERNMENT

## 2023-08-01 DIAGNOSIS — Z12.11 COLON CANCER SCREENING: ICD-10-CM

## 2023-08-01 PROCEDURE — 82274 ASSAY TEST FOR BLOOD FECAL: CPT | Performed by: STUDENT IN AN ORGANIZED HEALTH CARE EDUCATION/TRAINING PROGRAM

## 2023-08-08 LAB — HEMOCCULT STL QL IA: NEGATIVE

## 2023-11-01 ENCOUNTER — HOSPITAL ENCOUNTER (OUTPATIENT)
Dept: RADIOLOGY | Facility: HOSPITAL | Age: 48
Discharge: HOME | End: 2023-11-01
Attending: INTERNAL MEDICINE
Payer: OTHER GOVERNMENT

## 2023-11-01 ENCOUNTER — OFFICE VISIT (OUTPATIENT)
Dept: FAMILY MEDICINE | Facility: CLINIC | Age: 48
End: 2023-11-01
Payer: OTHER GOVERNMENT

## 2023-11-01 VITALS
DIASTOLIC BLOOD PRESSURE: 84 MMHG | BODY MASS INDEX: 29.14 KG/M2 | SYSTOLIC BLOOD PRESSURE: 122 MMHG | RESPIRATION RATE: 16 BRPM | OXYGEN SATURATION: 98 % | HEART RATE: 72 BPM | HEIGHT: 71 IN | WEIGHT: 208.13 LBS

## 2023-11-01 DIAGNOSIS — R10.32 LEFT GROIN PAIN: ICD-10-CM

## 2023-11-01 DIAGNOSIS — M54.50 CHRONIC LOW BACK PAIN WITHOUT SCIATICA, UNSPECIFIED BACK PAIN LATERALITY: ICD-10-CM

## 2023-11-01 DIAGNOSIS — N45.1 EPIDIDYMITIS: ICD-10-CM

## 2023-11-01 DIAGNOSIS — G89.29 CHRONIC LOW BACK PAIN WITHOUT SCIATICA, UNSPECIFIED BACK PAIN LATERALITY: ICD-10-CM

## 2023-11-01 DIAGNOSIS — N45.1 EPIDIDYMITIS: Primary | ICD-10-CM

## 2023-11-01 DIAGNOSIS — F32.9 REACTIVE DEPRESSION: ICD-10-CM

## 2023-11-01 PROCEDURE — 99214 OFFICE O/P EST MOD 30 MIN: CPT | Mod: PBBFAC,PN | Performed by: INTERNAL MEDICINE

## 2023-11-01 PROCEDURE — 76870 US SCROTUM AND TESTICLES: ICD-10-PCS | Mod: 26,,, | Performed by: RADIOLOGY

## 2023-11-01 PROCEDURE — 99999 PR PBB SHADOW E&M-EST. PATIENT-LVL IV: CPT | Mod: PBBFAC,,, | Performed by: INTERNAL MEDICINE

## 2023-11-01 PROCEDURE — 99214 PR OFFICE/OUTPT VISIT, EST, LEVL IV, 30-39 MIN: ICD-10-PCS | Mod: S$PBB,,, | Performed by: INTERNAL MEDICINE

## 2023-11-01 PROCEDURE — 76870 US EXAM SCROTUM: CPT | Mod: TC,PO

## 2023-11-01 PROCEDURE — 99999 PR PBB SHADOW E&M-EST. PATIENT-LVL IV: ICD-10-PCS | Mod: PBBFAC,,, | Performed by: INTERNAL MEDICINE

## 2023-11-01 PROCEDURE — 73502 XR HIP WITH PELVIS WHEN PERFORMED, 2 OR 3 VIEWS LEFT: ICD-10-PCS | Mod: 26,LT,, | Performed by: RADIOLOGY

## 2023-11-01 PROCEDURE — 73502 X-RAY EXAM HIP UNI 2-3 VIEWS: CPT | Mod: 26,LT,, | Performed by: RADIOLOGY

## 2023-11-01 PROCEDURE — 76870 US EXAM SCROTUM: CPT | Mod: 26,,, | Performed by: RADIOLOGY

## 2023-11-01 PROCEDURE — 99214 OFFICE O/P EST MOD 30 MIN: CPT | Mod: S$PBB,,, | Performed by: INTERNAL MEDICINE

## 2023-11-01 PROCEDURE — 73502 X-RAY EXAM HIP UNI 2-3 VIEWS: CPT | Mod: TC,PN,LT

## 2023-11-01 RX ORDER — DULOXETIN HYDROCHLORIDE 30 MG/1
90 CAPSULE, DELAYED RELEASE ORAL 2 TIMES DAILY
Qty: 90 CAPSULE | Refills: 2 | Status: SHIPPED | OUTPATIENT
Start: 2023-11-01 | End: 2023-12-26

## 2023-11-01 RX ORDER — IBUPROFEN 800 MG/1
800 TABLET ORAL DAILY PRN
COMMUNITY
Start: 2023-06-28

## 2023-11-01 RX ORDER — LEVOFLOXACIN 500 MG/1
500 TABLET, FILM COATED ORAL DAILY
Qty: 10 TABLET | Refills: 0 | Status: SHIPPED | OUTPATIENT
Start: 2023-11-01 | End: 2023-11-11

## 2023-11-01 RX ORDER — TIZANIDINE 4 MG/1
4 TABLET ORAL EVERY 6 HOURS PRN
Qty: 30 TABLET | Refills: 2 | Status: SHIPPED | OUTPATIENT
Start: 2023-11-01 | End: 2023-11-11

## 2023-11-01 NOTE — PROGRESS NOTES
Assessment and Plan:    1. Epididymitis  - levoFLOXacin (LEVAQUIN) 500 MG tablet; Take 1 tablet (500 mg total) by mouth once daily. for 10 days  Dispense: 10 tablet; Refill: 0  - Urine culture  - C. trachomatis/N. gonorrhoeae by AMP DNA [facility]; Urine; Future  - US Scrotum And Testicles; Future  2. Left groin pain  - X-Ray Hip 2 or 3 views Left (with Pelvis when performed); Future  - tiZANidine (ZANAFLEX) 4 MG tablet; Take 1 tablet (4 mg total) by mouth every 6 (six) hours as needed (muscle pain).  Dispense: 30 tablet; Refill: 2  - US Scrotum And Testicles; Future    Left groin pain etiology not entirely clear.  Definitely has substantial focal tenderness to palpation over left epididymis so I do suspect that most likely cause of his current symptoms as epididymitis.  However he reports that he is also been having pain in the area of his left hip joint that started after a new workout routine and so we discussed that there could be a component of hip arthritis versus tendinitis in this area as well.  We will treat for epididymitis including testing with urine culture and urine gonorrhea chlamydia, but we will also obtain x-ray of the left hip to evaluate for substantial arthritis.  I have also recommended an ultrasound of the scrotum and testes.  I was not able to appreciate any inguinal hernia on exam, we discussed that it is possible it is difficult to feel and he has a herniation down into the scrotum, which is part of why I am getting an ultrasound.    3. Reactive depression  Discussed options with patient including increasing dose of Cymbalta versus trying a different medication.  As he is about to go out of town, would not like to start a different medication at this time.  We will try increased dose of Cymbalta to 90 mg daily.  Follow-up with PCP or myself in 1-2 months.  - DULoxetine (CYMBALTA) 30 MG capsule; Take 3 capsules (90 mg total) by mouth 2 (two) times daily.  Dispense: 90 capsule; Refill: 2    4.  Chronic low back pain without sciatica, unspecified back pain laterality  Patient reports that the Robaxin has not been very effective lately and would like to try an alternative muscle relaxer.  - tiZANidine (ZANAFLEX) 4 MG tablet; Take 1 tablet (4 mg total) by mouth every 6 (six) hours as needed (muscle pain).  Dispense: 30 tablet; Refill: 2      ______________________________________________________________________  Subjective:    Chief Complaint:  Groin pain    HPI:  Hi is a 48 y.o. year old male here for evaluation of groin pain.      He reports that he has been having left-sided groin pain for about 1 month.  He did not remember any specific injury before this started.  He did state that he would recently started working out again (predominantly rowing) and states that the pain did start after he was rowing.  He reports that the pain does not feel like it is inside of his abdomen, feels like it is more at the juncture of his leg and perineum and does seem to radiate into his testicles.  He does also report some pain is in his anterior left hip area that is worse with certain moves.  The pain is worse when he lays on his left side.      He is not been having trouble walking.  He is not been having any pain or burning with urination.  He is sexually active with his wife only for many years.    He also would like to discuss issues that he has been having recently with anxiety.  Current situation in [facility] has been causing lot of increased anxiety.  He also was about to have to travel for treatment for his son severe scoliosis.  He has been taking duloxetine 60 mg daily.  Reports that initially he had some improvement when he was increased to 60 mg daily refill like this is currently waning.      Medications:  Current Outpatient Medications on File Prior to Visit   Medication Sig Dispense Refill    ALPRAZolam (XANAX) 0.5 MG tablet Take 1 tablet (0.5 mg total) by mouth 2 (two) times daily as needed for Anxiety. 28  "tablet 1    diclofenac sodium (VOLTAREN) 1 % Gel Apply 2 g topically 3 (three) times daily as needed (joint pain). 20 g 3    gabapentin (NEURONTIN) 600 MG tablet Take 1 tablet (600 mg total) by mouth 2 (two) times daily. 180 tablet 3    ibuprofen (ADVIL,MOTRIN) 800 MG tablet Take 800 mg by mouth daily as needed.      multivitamin (THERAGRAN) per tablet Take 1 tablet by mouth once daily.      [DISCONTINUED] DULoxetine (CYMBALTA) 30 MG capsule Take 2 capsules (60 mg total) by mouth 2 (two) times daily. 60 capsule 11    [DISCONTINUED] methocarbamoL (ROBAXIN) 500 MG Tab Take 1 tablet (500 mg total) by mouth 3 (three) times daily as needed (pain). (Patient not taking: Reported on 11/1/2023) 90 tablet 3     No current facility-administered medications on file prior to visit.       Review of Systems:  Review of Systems   Genitourinary:  Positive for testicular pain. Negative for difficulty urinating and dysuria.   Musculoskeletal:  Negative for gait problem and joint swelling.   Psychiatric/Behavioral:  Negative for dysphoric mood. The patient is nervous/anxious.        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   Constitutional:       General: He is not in acute distress.     Appearance: He is well-developed.   Eyes:      Conjunctiva/sclera: Conjunctivae normal.   Cardiovascular:      Rate and Rhythm: Normal rate and regular rhythm.   Pulmonary:      Effort: Pulmonary effort is normal. No respiratory distress.   Genitourinary:     Comments: No testicular masses palpated.  Patient has substantial focal tenderness to palpation of left epididymis.  Musculoskeletal:      Comments: Anterior left hip area pain is elicited with any resisted hip flexion, " with any external rotation of his left hip, and with palpation of the muscle attachments at his left groin.   Skin:     General: Skin is warm and dry.   Neurological:      Mental Status: He is alert and oriented to person, place, and time.   Psychiatric:         Mood and Affect: Mood normal.         Behavior: Behavior normal.         Data:  Normal renal function on labs earlier this year    Neela Valencia MD  Internal Medicine

## 2023-11-15 ENCOUNTER — TELEPHONE (OUTPATIENT)
Dept: FAMILY MEDICINE | Facility: CLINIC | Age: 48
End: 2023-11-15
Payer: OTHER GOVERNMENT

## 2023-11-15 ENCOUNTER — PATIENT MESSAGE (OUTPATIENT)
Dept: FAMILY MEDICINE | Facility: CLINIC | Age: 48
End: 2023-11-15
Payer: OTHER GOVERNMENT

## 2023-11-15 DIAGNOSIS — J34.2 NASAL SEPTAL DEVIATION: Primary | ICD-10-CM

## 2023-11-15 DIAGNOSIS — N50.812 TESTICULAR PAIN, LEFT: Primary | ICD-10-CM

## 2023-11-15 DIAGNOSIS — N45.3 EPIDIDYMO-ORCHITIS: ICD-10-CM

## 2023-11-15 RX ORDER — SULFAMETHOXAZOLE AND TRIMETHOPRIM 800; 160 MG/1; MG/1
1 TABLET ORAL 2 TIMES DAILY
Qty: 14 TABLET | Refills: 0 | Status: SHIPPED | OUTPATIENT
Start: 2023-11-15 | End: 2023-11-30

## 2023-11-15 NOTE — TELEPHONE ENCOUNTER
----- Message from Shukri Flores MD sent at 11/15/2023  1:01 PM CST -----  Contact: self  Referral sent. And he would be due for an annual visit in February  ----- Message -----  From: Leigh Ann Anthony MA  Sent: 11/15/2023  11:34 AM CST  To: Shukri Flores MD    Please see message below:  ----- Message -----  From: Radha Dang  Sent: 11/15/2023  11:02 AM CST  To: Sandra Watt Staff    Patient is req for you to put a referral in for him to see an Ent Doctor with [facility]. He was having dental work and the dentist took exrays and said he has a deviated septum that he needs to have repaired. Needs consult to make sure of this issue, so call back at [phone] and thanks   Plus pt wants to know when he is due to see the doctor.

## 2023-11-29 ENCOUNTER — PATIENT MESSAGE (OUTPATIENT)
Dept: FAMILY MEDICINE | Facility: CLINIC | Age: 48
End: 2023-11-29
Payer: OTHER GOVERNMENT

## 2023-11-29 NOTE — TELEPHONE ENCOUNTER
Pt has acute severe testicular pain that is not subsiding and needs to see Urology asap.Urology in [place] has appts tomorrow. Called pt to offer. No answer. Left msg for pt to call back.

## 2023-11-30 ENCOUNTER — OFFICE VISIT (OUTPATIENT)
Dept: UROLOGY | Facility: CLINIC | Age: 48
End: 2023-11-30
Payer: OTHER GOVERNMENT

## 2023-11-30 VITALS
DIASTOLIC BLOOD PRESSURE: 84 MMHG | BODY MASS INDEX: 29.14 KG/M2 | WEIGHT: 208.13 LBS | HEIGHT: 71 IN | SYSTOLIC BLOOD PRESSURE: 122 MMHG | HEART RATE: 72 BPM

## 2023-11-30 DIAGNOSIS — R10.9 LEFT SIDED ABDOMINAL PAIN: Primary | ICD-10-CM

## 2023-11-30 DIAGNOSIS — R10.2 PELVIC PAIN: ICD-10-CM

## 2023-11-30 DIAGNOSIS — N45.2 ORCHITIS OF LEFT TESTICLE: ICD-10-CM

## 2023-11-30 PROCEDURE — 99204 PR OFFICE/OUTPT VISIT, NEW, LEVL IV, 45-59 MIN: ICD-10-PCS | Mod: S$PBB,,, | Performed by: NURSE PRACTITIONER

## 2023-11-30 PROCEDURE — 99214 OFFICE O/P EST MOD 30 MIN: CPT | Mod: PBBFAC,PO | Performed by: NURSE PRACTITIONER

## 2023-11-30 PROCEDURE — 99999 PR PBB SHADOW E&M-EST. PATIENT-LVL IV: CPT | Mod: PBBFAC,,, | Performed by: NURSE PRACTITIONER

## 2023-11-30 PROCEDURE — 99204 OFFICE O/P NEW MOD 45 MIN: CPT | Mod: S$PBB,,, | Performed by: NURSE PRACTITIONER

## 2023-11-30 PROCEDURE — 99999 PR PBB SHADOW E&M-EST. PATIENT-LVL IV: ICD-10-PCS | Mod: PBBFAC,,, | Performed by: NURSE PRACTITIONER

## 2023-11-30 NOTE — PROGRESS NOTES
[facility] Note  Staff: MADELYN McgowanC    PCP: GABRIEL Flores    Chief Complaint: Groin/Testicle pain    Subjective:        HPI: Hi Salmeron is a 48 y.o. male NEW PT presents today with c/o LLQ abdominal pain at this time.    Pt was recently evaluated and treated for possible testicle infection with 2 rounds of antibiotics by his PCP-Levaquin for 10 days, then Bactrim DS.    Today, pt c/o left lower pelvic/abdominal discomfort.  The testicle pain has now dissipated since taking antibiotics.  Pt was unable to give a urine specimen today.  No family hx of  Cancers.    US Scrotum and Testicles  11/1/23:  FINDINGS:  Right Testicle:   *Size: 4.4 x 2.5 x 3.1 cm  *Appearance: Normal homogeneous parenchymal echotexture. No solid masses.No microlithiasis.  *Flow: Normal arterial and venous flow.No evidence of acute epididymo-orchitis or testicular torsion.  *Epididymis: Normal.  *Hydrocele: None.  *Varicocele: None.  .   Left Testicle:   *Size: 4.3 x 2.4 x 2.7 cm  *Appearance: There is a diffuse heterogeneous appearance of the left testicular parenchyma without discrete solid vascular mass or definitive sonographic evidence acute epididymo-orchitis.  This could be the result of a prior episode of orchitis.  No microlithiasis.  *Flow: Normal arterial and venous flow.No evidence of testicular torsion.  *Epididymis: Normal.  *Hydrocele: None.  *Varicocele: None.  Other findings: None.     Impression:  Nonspecific asymmetric heterogeneous hypoechoic appearance of the left testicle which could relate to a previous insult, possibly previous trauma or prior left orchitis.  No discrete left testicular mass or sonographic evidence of acute orchitis.      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    Medications: reviewed     Objective:     Vitals:    11/30/23 1112   BP: 122/84   Pulse: 72       General:WDWN in NAD  Eyes: PERRLA, normal conjunctiva  Respiratory: no increased work on breathing, clear to auscultation  Cardiovascular: regular rate and rhythm. No obvious extremity edema.  GI: palpation of masses. No tenderness. No hepatosplenomegaly to palpation.  Musculoskeletal: normal range of motion of bilateral upper extremities. Normal muscle strength and tone.  Skin: no obvious rashes or lesions. No tightening of skin noted.  Neurologic: CN grossly normal. Normal sensation.   Psychiatric: awake, alert and oriented x 3. Mood and affect normal. Cooperative.    Assessment:       1. Left sided abdominal pain    2. Orchitis of left testicle    3. Pelvic pain          Plan:     1.  Ultrasound of the Scrotum/Testicles to be repeated to see if infection has gone since finishing antibiotics at this time.  Also, will order US Abdomen Limited due to the area of pt's concern at this time.    2.  The following instructions were given to the patient to assist with discomfort:  -Use jockstrap or the best supportive underwear he can get for relief of pressure.  -Alternate ice packs/heating pad to areas.  -Take OTC anti-inflammatories  -Sit in a warm tub of water greater than 15 minutes  -Elevate Scrotal Sac     F/u--We will contact pt once we receive and review pending imaging results.  Imaging to be done at [facility].  Pt verbalized understanding.    [facility]: Active    NICK Birch

## 2023-12-05 ENCOUNTER — HOSPITAL ENCOUNTER (OUTPATIENT)
Dept: RADIOLOGY | Facility: HOSPITAL | Age: 48
Discharge: HOME | End: 2023-12-05
Attending: NURSE PRACTITIONER
Payer: OTHER GOVERNMENT

## 2023-12-05 DIAGNOSIS — N45.2 ORCHITIS OF LEFT TESTICLE: ICD-10-CM

## 2023-12-05 DIAGNOSIS — R10.9 LEFT SIDED ABDOMINAL PAIN: ICD-10-CM

## 2023-12-05 PROCEDURE — 76870 US SCROTUM AND TESTICLES: ICD-10-PCS | Mod: 26,,, | Performed by: RADIOLOGY

## 2023-12-05 PROCEDURE — 76705 ECHO EXAM OF ABDOMEN: CPT | Mod: TC,PO

## 2023-12-05 PROCEDURE — 76870 US EXAM SCROTUM: CPT | Mod: 26,,, | Performed by: RADIOLOGY

## 2023-12-05 PROCEDURE — 76705 US ABDOMEN LIMITED_HERNIA: ICD-10-PCS | Mod: 26,,, | Performed by: RADIOLOGY

## 2023-12-05 PROCEDURE — 76870 US EXAM SCROTUM: CPT | Mod: TC,PO

## 2023-12-05 PROCEDURE — 76705 ECHO EXAM OF ABDOMEN: CPT | Mod: 26,,, | Performed by: RADIOLOGY

## 2023-12-06 ENCOUNTER — PATIENT MESSAGE (OUTPATIENT)
Dept: UROLOGY | Facility: CLINIC | Age: 48
End: 2023-12-06
Payer: OTHER GOVERNMENT

## 2023-12-06 NOTE — TELEPHONE ENCOUNTER
Urology Telephone Encounter:    Results from both US were completed earlier this afternoon.  Please contact pt and advise of findings as described within result notes.  Thanks.

## 2023-12-07 ENCOUNTER — PATIENT MESSAGE (OUTPATIENT)
Dept: FAMILY MEDICINE | Facility: CLINIC | Age: 48
End: 2023-12-07
Payer: OTHER GOVERNMENT

## 2023-12-07 ENCOUNTER — OFFICE VISIT (OUTPATIENT)
Dept: PODIATRY | Facility: CLINIC | Age: 48
End: 2023-12-07
Payer: OTHER GOVERNMENT

## 2023-12-07 VITALS — WEIGHT: 208.13 LBS | HEIGHT: 71 IN | BODY MASS INDEX: 29.14 KG/M2

## 2023-12-07 DIAGNOSIS — B07.0 PLANTAR WART OF RIGHT FOOT: Primary | ICD-10-CM

## 2023-12-07 DIAGNOSIS — M79.671 FOOT PAIN, RIGHT: ICD-10-CM

## 2023-12-07 PROCEDURE — 99213 PR OFFICE/OUTPT VISIT, EST, LEVL III, 20-29 MIN: ICD-10-PCS | Mod: S$PBB,,, | Performed by: PODIATRIST

## 2023-12-07 PROCEDURE — 99213 OFFICE O/P EST LOW 20 MIN: CPT | Mod: PBBFAC,PO | Performed by: PODIATRIST

## 2023-12-07 PROCEDURE — 99213 OFFICE O/P EST LOW 20 MIN: CPT | Mod: S$PBB,,, | Performed by: PODIATRIST

## 2023-12-07 PROCEDURE — 99999 PR PBB SHADOW E&M-EST. PATIENT-LVL III: CPT | Mod: PBBFAC,,, | Performed by: PODIATRIST

## 2023-12-07 PROCEDURE — 99999 PR PBB SHADOW E&M-EST. PATIENT-LVL III: ICD-10-PCS | Mod: PBBFAC,,, | Performed by: PODIATRIST

## 2023-12-10 NOTE — PROGRESS NOTES
Subjective:      Patient ID: Hi Salmeron is a 48 y.o. male.    Chief Complaint: Plantar Warts  Patient presents to clinic with the chief complaint of recurring plantar warts of the Rt. Foot.  States he is unable to tell if they rest in the same plane as the original wart that was excised in  or if this is a completely new occurrence.  Notes pain from the site with applied pressure from weight bearing.  Rates pain as a 1/10.  Symptoms are alleviated with rest.  He has not attempted to self treat.  Inquires as to how this should be resolved.  Denies any additional pedal complaints.        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        Past Surgical History:   Procedure Laterality Date    BICEPS TENDON REPAIR      FOOT MASS EXCISION Right 2021    Procedure: EXCISION, MASS, FOOT;  Surgeon: Mamadou Bernal DPM;  Location: [facility];  Service: Podiatry;  Laterality: Right;    UNDESCENDED TESTICLE EXPLORATION Left        Family History   Problem Relation Age of Onset    Breast cancer Mother 37    Cancer Mother     Hypertension Father     Heart attack Father     Leukemia Other         Nephew    Throat cancer Maternal Uncle     Blindness Neg Hx     Cataracts Neg Hx     Diabetes Neg Hx     Glaucoma Neg Hx     Macular degeneration Neg Hx     Retinal detachment Neg Hx     Strabismus Neg Hx     Stroke Neg Hx     Thyroid disease Neg Hx        Social History     Socioeconomic History    Marital status:    Occupational History     Employer: [employer]   Tobacco Use    Smoking status: Former     Current packs/day: 0.00     Average packs/day: 0.3 packs/day for 10.0 years (2.5 ttl pk-yrs)     Types: Cigarettes     Start date: 1995     Quit date: 2005     Years since quittin.9    Smokeless tobacco: Never   Substance and Sexual Activity    Alcohol use: Not Currently     Comment: At max, twice a kay but usually once every few months     Drug use: No    Sexual activity: Yes     Partners: Female     Birth control/protection: None   Social History Narrative    Lives with wife and two children.      Social Determinants of Health     Financial Resource Strain: Low Risk  (2/15/2023)    Overall Financial Resource Strain (CARDIA)     Difficulty of Paying Living Expenses: Not hard at all   Food Insecurity: No Food Insecurity (2/15/2023)    Hunger Vital Sign     Worried About Running Out of Food in the Last Year: Never true     Ran Out of Food in the Last Year: Never true   Transportation Needs: No Transportation Needs (2/15/2023)    PRAPARE - Transportation     Lack of Transportation (Medical): No     Lack of Transportation (Non-Medical): No   Physical Activity: Insufficiently Active (2/15/2023)    Exercise Vital Sign     Days of Exercise per Week: 1 day     Minutes of Exercise per Session: 40 min   Stress: Stress Concern Present (2/15/2023)    [employer] - Occupational Stress Questionnaire     Feeling of Stress : Rather much   Social Connections: Unknown (2/15/2023)    Social Connection and Isolation Panel [NHANES]     Frequency of Communication with Friends and Family: Never     Frequency of Social Gatherings with Friends and Family: Twice a week     Active Member of Clubs or Organizations: No     Attends Club or Organization Meetings: Never     Marital Status:    Housing Stability: High Risk (2/15/2023)    Housing Stability Vital Sign     Unable to Pay for Housing in the Last Year: Yes     Number of Places Lived in the Last Year: 2     Unstable Housing in the Last Year: No       Current Outpatient Medications   Medication Sig Dispense Refill    ALPRAZolam (XANAX) 0.5 MG tablet Take 1 tablet (0.5 mg total) by mouth 2 (two) times daily as needed for Anxiety. (Patient not taking: Reported on 11/30/2023) 28 tablet 1    diclofenac sodium (VOLTAREN) 1 % Gel Apply 2 g topically 3 (three) times daily as needed (joint pain).  20 g 3    DULoxetine (CYMBALTA) 30 MG capsule Take 3 capsules (90 mg total) by mouth 2 (two) times daily. (Patient not taking: Reported on 11/30/2023) 90 capsule 2    gabapentin (NEURONTIN) 600 MG tablet Take 1 tablet (600 mg total) by mouth 2 (two) times daily. 180 tablet 3    ibuprofen (ADVIL,MOTRIN) 800 MG tablet Take 800 mg by mouth daily as needed.      multivitamin (THERAGRAN) per tablet Take 1 tablet by mouth once daily.       No current facility-administered medications for this visit.       Review of patient's allergies indicates:  No Known Allergies    Review of Systems   Constitutional: Negative for chills and fever.   Cardiovascular:  Negative for claudication and leg swelling.   Skin:  Positive for suspicious lesions. Negative for color change and nail changes.   Musculoskeletal:  Negative for joint pain, joint swelling, muscle cramps and muscle weakness.   Gastrointestinal:  Negative for nausea and vomiting.   Neurological:  Negative for numbness and paresthesias.   Psychiatric/Behavioral:  Negative for altered mental status.            Objective:      Physical Exam  Constitutional:       Appearance: Normal appearance. He is not ill-appearing.   Cardiovascular:      Pulses:           Dorsalis pedis pulses are 2+ on the right side and 2+ on the left side.        Posterior tibial pulses are 2+ on the right side and 2+ on the left side.      Comments: CFT is < 3 seconds bilateral.  Pedal hair growth is present bilateral.  No lower extremity edema noted bilateral.  Toes are warm to touch bilateral.    Musculoskeletal:         General: Tenderness present. No deformity or signs of injury.      Right lower leg: No edema.      Left lower leg: No edema.      Comments: Muscle strength 5/5 in all muscle groups bilateral.  No tenderness nor crepitation with ROM of foot/ankle joints bilateral.  Mild pain with palpation to both a wart and porokeratosis slightly medial and inferior to the prior incision of the Rt.  4th plantar intermetatarsal space.     Skin:     General: Skin is warm and dry.      Capillary Refill: Capillary refill takes less than 2 seconds.      Findings: Lesion present. No bruising, ecchymosis, erythema, signs of injury, laceration, petechiae, rash or wound.      Comments: Pedal skin has normal turgor, temperature, and texture bilateral.  Small porokeratosis noted medial and inferior to the prior incision site of the Rt. Plantar 4th intermetatarsal space.  Roughly 3 mm proximal and lateral to this is a small plantar wart with intralesional capillaries.  This site is also devoid of intralesional skin lines.  Measures 0.3 x 0.3cm.     Neurological:      Mental Status: He is alert.      Sensory: No sensory deficit.      Motor: No weakness or atrophy.      Comments: Light touch is intact bilateral.                 Assessment:       Encounter Diagnoses   Name Primary?    Plantar wart of right foot Yes    Foot pain, right          Plan:       Hi was seen today for plantar warts.    Diagnoses and all orders for this visit:    Plantar wart of right foot    Foot pain, right      I counseled the patient on his conditions, their implications and medical management.    Advised to begin applying Maximum strength Compound W to the site and cover with an occlusive dressing QD x 3 weeks.    With treatment, advised to let the site air dry if it becomes too macerated during the next three weeks.    RTC in 3 weeks.  If unresolved, will block out 40 minutes of time for surgical excision in office.      Mamadou Bernal DPM

## 2023-12-23 DIAGNOSIS — F32.9 REACTIVE DEPRESSION: ICD-10-CM

## 2023-12-23 NOTE — TELEPHONE ENCOUNTER
Care Due:                  Date            Visit Type   Department     Provider  --------------------------------------------------------------------------------                                MYCHART                              FOLLOWUP/OF  [facility]  Last Visit: 02-      FICE VISIT   [facility]       Shukri Flores  Next Visit: None Scheduled  None         None Found                                                            Last  Test          Frequency    Reason                     Performed    Due Date  --------------------------------------------------------------------------------    Office Visit  12 months..  diclofenac...............  02- 02-    CBC.........  12 months..  diclofenac...............  Not Found    Overdue    Cr..........  12 months..  DULoxetine, diclofenac...  01- 01-    [facility] Embedded Care Due Messages. Reference number: [id number].   12/23/2023 3:37:26 AM CST

## 2023-12-24 NOTE — TELEPHONE ENCOUNTER
Refill Routing Note   Medication(s) are not appropriate for processing by [facility] for the following reason(s):        New or recently adjusted medication    [facility] action(s):  Defer   Requires appointment : Yes     Requires labs : Yes             Appointments  past 12m or future 3m with PCP    Date Provider   Last Visit   2/16/2023 Shukri Flores MD   Next Visit   3/6/2024 Shukri Flores MD   ED visits in past 90 days: 0        Note composed:11:08 AM 12/24/2023

## 2023-12-26 RX ORDER — DULOXETIN HYDROCHLORIDE 30 MG/1
CAPSULE, DELAYED RELEASE ORAL
Qty: 90 CAPSULE | Refills: 1 | Status: SHIPPED | OUTPATIENT
Start: 2023-12-26

## 2024-01-17 ENCOUNTER — PATIENT MESSAGE (OUTPATIENT)
Dept: OTOLARYNGOLOGY | Facility: CLINIC | Age: 49
End: 2024-01-17

## 2024-01-17 ENCOUNTER — OFFICE VISIT (OUTPATIENT)
Dept: OTOLARYNGOLOGY | Facility: CLINIC | Age: 49
End: 2024-01-17
Payer: OTHER GOVERNMENT

## 2024-01-17 VITALS — WEIGHT: 221.13 LBS | BODY MASS INDEX: 30.96 KG/M2 | HEIGHT: 71 IN

## 2024-01-17 DIAGNOSIS — G47.33 OSA (OBSTRUCTIVE SLEEP APNEA): ICD-10-CM

## 2024-01-17 DIAGNOSIS — J34.2 NASAL SEPTAL DEVIATION: Primary | ICD-10-CM

## 2024-01-17 DIAGNOSIS — J34.3 NASAL TURBINATE HYPERTROPHY: ICD-10-CM

## 2024-01-17 DIAGNOSIS — G44.89: ICD-10-CM

## 2024-01-17 DIAGNOSIS — I15.8 OTHER SECONDARY HYPERTENSION: ICD-10-CM

## 2024-01-17 DIAGNOSIS — R09.81 NASAL CONGESTION: ICD-10-CM

## 2024-01-17 PROCEDURE — 99204 OFFICE O/P NEW MOD 45 MIN: CPT | Mod: 25,S$PBB,, | Performed by: STUDENT IN AN ORGANIZED HEALTH CARE EDUCATION/TRAINING PROGRAM

## 2024-01-17 PROCEDURE — 31231 NASAL ENDOSCOPY DX: CPT | Mod: PBBFAC,PO | Performed by: STUDENT IN AN ORGANIZED HEALTH CARE EDUCATION/TRAINING PROGRAM

## 2024-01-17 PROCEDURE — 31231 NASAL ENDOSCOPY DX: CPT | Mod: S$PBB,,, | Performed by: STUDENT IN AN ORGANIZED HEALTH CARE EDUCATION/TRAINING PROGRAM

## 2024-01-17 PROCEDURE — 99214 OFFICE O/P EST MOD 30 MIN: CPT | Mod: PBBFAC,PO | Performed by: STUDENT IN AN ORGANIZED HEALTH CARE EDUCATION/TRAINING PROGRAM

## 2024-01-17 PROCEDURE — 99999 PR PBB SHADOW E&M-EST. PATIENT-LVL IV: CPT | Mod: PBBFAC,,, | Performed by: STUDENT IN AN ORGANIZED HEALTH CARE EDUCATION/TRAINING PROGRAM

## 2024-01-17 RX ORDER — FLUTICASONE PROPIONATE 50 MCG
1 SPRAY, SUSPENSION (ML) NASAL 2 TIMES DAILY
Qty: 16 G | Refills: 11 | Status: SHIPPED | OUTPATIENT
Start: 2024-01-17 | End: 2025-01-16

## 2024-01-17 NOTE — PROGRESS NOTES
Otolaryngology Clinic Note    Subjective:       Patient ID: Hi Salmeron is a 48 y.o. male.    Chief Complaint: deviated septal      History of Present Illness: Hi Salmeron is a 48 y.o. male presenting with DNS, was trying to get cosmetic dental work done but teeth grinds and was told he could not. Was told it was breathing related issue. He has always had trouble catching his breathe and cannot get enough air out of his nose. Any exercise, will mouth breathe. Not sure about tonsils in past. No broken nose. He does have sleep apnea.   Reports sleep study in 2022 at [facility]. 15+ pauses an hour. Just getting CPAP now from recall, worried about tolerating mask.   Has used afrin before, helps when sick. No other nasal sprays. No allergy medicines. Does not usually have nasal drip, some seasonal issues past couple of years. He does report worse left sided headaches.       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      Social Determinants of Health     Tobacco Use: Medium Risk (1/17/2024)    Patient History     Smoking Tobacco Use: Former     Smokeless Tobacco Use: Never     Passive Exposure: Not on file   Alcohol Use: Not At Risk (1/15/2024)    AUDIT-C     Frequency of Alcohol Consumption: 2-4 times a month     Average Number of Drinks: 1 or 2     Frequency of Binge Drinking: Never   Financial Resource Strain: Low Risk  (1/15/2024)    Overall Financial Resource Strain (CARDIA)     Difficulty of Paying Living Expenses: Not hard at all   Food Insecurity: No Food Insecurity (1/15/2024)    Hunger Vital Sign     Worried About Running Out of Food in the Last Year: Never true     Ran Out of  Food in the Last Year: Never true   Transportation Needs: No Transportation Needs (1/15/2024)    PRAPARE - Transportation     Lack of Transportation (Medical): No     Lack of Transportation (Non-Medical): No   Physical Activity: Insufficiently Active (1/15/2024)    Exercise Vital Sign     Days of Exercise per Week: 2 days     Minutes of Exercise per Session: 30 min   Stress: Stress Concern Present (1/15/2024)    [employer] - Occupational Stress Questionnaire     Feeling of Stress : Very much   Social Connections: Unknown (1/15/2024)    Social Connection and Isolation Panel [NHANES]     Frequency of Communication with Friends and Family: Once a week     Frequency of Social Gatherings with Friends and Family: Once a week     Attends Alevism Services: Not on file     Active Member of Clubs or Organizations: No     Attends Club or Organization Meetings: Never     Marital Status:    Housing Stability: Low Risk  (1/15/2024)    Housing Stability Vital Sign     Unable to Pay for Housing in the Last Year: No     Number of Places Lived in the Last Year: 1     Unstable Housing in the Last Year: No   Depression: Low Risk  (1/10/2023)    Depression     Last PHQ-4: Flowsheet Data: 1     Review of patient's allergies indicates:  No Known Allergies  Current Outpatient Medications   Medication Instructions    ALPRAZolam (XANAX) 0.5 mg, Oral, 2 times daily PRN    diclofenac sodium (VOLTAREN) 2 g, Topical (Top), 3 times daily PRN    DULoxetine (CYMBALTA) 30 MG capsule TAKE 3 CAPSULES(90 MG) BY MOUTH TWICE DAILY    fluticasone propionate (FLONASE) 50 mcg, Each Nostril, 2 times daily    gabapentin (NEURONTIN) 600 mg, Oral, 2 times daily    ibuprofen (ADVIL,MOTRIN) 800 mg, Oral, Daily PRN    multivitamin (THERAGRAN) per tablet 1 tablet, Oral, Daily         ENT ROS negative except as stated above.     Patient answers are not available for this visit.            Objective:      There were no vitals  filed for this visit.    General: NAD, well appearing  Eyes: Normal conjunctiva and lids  Face: symmetric, nerve intact, maxilla with mild hypoplasia  Nose: The nose is without any evidence of any deformity. The nasal mucosa is moist. The septum is midline. There is no evidence of septal hematoma. The turbinates are without abnormality.   Ears: The ears are with normal-appearing pinna. Examination of the canals is normal appearing bilaterally. There is no drainage or erythema noted. The tympanic membranes are normal appearing with pearly color, normal-appearing landmarks and normal light reflex. Hearing is grossly intact.  Mouth: No obvious abnormalities to the lips. The teeth are unremarkable. The gingivae are without any obvious evidence of infection or lesion. The oral mucosa is moist and pink. There are no obvious masses to the hard or soft palate.   Oropharynx: The uvula is midline.  The tongue is midline. The posterior pharynx is without erythema or exudate. The tonsils are normal appearing.  Salivary glands: The salivary glands are symmetric and not enlarged, no masses  Neck: No lymphadenopathy, trachea midline, thryoid not enlarged.  Psych: Normal mood and affect.   Neuro: Grossly intact  Speech: fluent    Procedure: Nasal Endoscopy  Indications: DNS, JOY  Verbal consent obtained  Anesthesia: topical lidocaine and phenylephrine spray    Procedure: Rigid 0 degree endoscope was passed into each nare to nasopharynx showing findings below.     Septum is deviated left with spur contacting side wall. Nasal mucosa inflamed. Turbinates are large size and respond to decongestant appropriately. Right maxillary sinus and sphenoethmoidal recess without purulence or polyps. Left maxillary sinus and sphenoethmoidal recess without purulence or polyps. No adenoid hypertrophy.            Assessment and Plan:       1. Nasal septal deviation    2. Nasal turbinate hypertrophy    3. Nasal congestion    4. JOY (obstructive sleep  apnea)    5. Sluder's syndrome          Offered medication management vs proceeding with surgery to assist with nasal airway and CPAP tolerance. He would like to proceed with surgery. He also may have contact point headaches on left, so could help with this.     PSG, reports Ahi 15.09 2022.     Will start flonase for now, If issues approving with insurance may need to try CPAP and flonase    RTC: 4 days post op    Plan of care was discussed in detail with the patient, who agreed with the plan as above. All questions were answered in detail.     Geovanna Wisdom MD  Otolaryngology

## 2024-01-18 ENCOUNTER — OFFICE VISIT (OUTPATIENT)
Dept: FAMILY MEDICINE | Facility: CLINIC | Age: 49
End: 2024-01-18
Payer: OTHER GOVERNMENT

## 2024-01-18 ENCOUNTER — PATIENT MESSAGE (OUTPATIENT)
Dept: FAMILY MEDICINE | Facility: CLINIC | Age: 49
End: 2024-01-18

## 2024-01-18 VITALS — HEIGHT: 71 IN | HEART RATE: 82 BPM | WEIGHT: 215.63 LBS | OXYGEN SATURATION: 95 % | BODY MASS INDEX: 30.19 KG/M2

## 2024-01-18 DIAGNOSIS — F43.0 ANXIETY AS ACUTE REACTION TO EXCEPTIONAL STRESS: Primary | ICD-10-CM

## 2024-01-18 DIAGNOSIS — J01.90 ACUTE RHINOSINUSITIS: ICD-10-CM

## 2024-01-18 DIAGNOSIS — F41.1 ANXIETY AS ACUTE REACTION TO EXCEPTIONAL STRESS: Primary | ICD-10-CM

## 2024-01-18 PROBLEM — I15.8 OTHER SECONDARY HYPERTENSION: Status: RESOLVED | Noted: 2023-01-10 | Resolved: 2024-01-18

## 2024-01-18 PROCEDURE — 99213 OFFICE O/P EST LOW 20 MIN: CPT | Mod: PBBFAC,PO | Performed by: STUDENT IN AN ORGANIZED HEALTH CARE EDUCATION/TRAINING PROGRAM

## 2024-01-18 PROCEDURE — 99999 PR PBB SHADOW E&M-EST. PATIENT-LVL III: CPT | Mod: PBBFAC,,, | Performed by: STUDENT IN AN ORGANIZED HEALTH CARE EDUCATION/TRAINING PROGRAM

## 2024-01-18 PROCEDURE — 99213 OFFICE O/P EST LOW 20 MIN: CPT | Mod: S$PBB,,, | Performed by: STUDENT IN AN ORGANIZED HEALTH CARE EDUCATION/TRAINING PROGRAM

## 2024-01-18 RX ORDER — PREDNISONE 20 MG/1
20 TABLET ORAL DAILY
Qty: 5 TABLET | Refills: 0 | Status: SHIPPED | OUTPATIENT
Start: 2024-01-18 | End: 2024-01-23

## 2024-01-18 RX ORDER — BUPROPION HYDROCHLORIDE 75 MG/1
TABLET ORAL
Qty: 63 TABLET | Refills: 0 | Status: SHIPPED | OUTPATIENT
Start: 2024-01-18 | End: 2024-02-19

## 2024-01-18 NOTE — PROGRESS NOTES
Name: Hi Salmeron  MRN: [id number]  : 1975  PCP: Shukri Flores MD    Subjective   Patient presents for regular follow up. He does mention URI symptoms he contracted in the last few days.    He has been suffering anxiety still. He saw Dr. Valencia back in September and she increased his duloxetine from 60mg daily to 90mg daily.     He had been having issues with left groin pain - had a course of antibiotics for epididymitis, ultrasounds of testicles that were negative and abdominal ultrasound that was negative for hernia. The pain has resolved on its own without any clear answer for the cause.       Review of Systems   HENT:  Positive for congestion, ear pain and sore throat.    Cardiovascular:  Negative for palpitations and leg swelling.   Neurological:  Negative for dizziness.       Patient Active Problem List   Diagnosis    Chronic neck pain    Neck pain    Anxiety as acute reaction to exceptional stress    Sprain of lateral collateral ligament of right knee    Strain of right hamstring    Open wound of plantar aspect of foot    Chronic pain of right knee    Chronic low back pain    Reactive depression    Joint pain in fingers of right hand    Acute rhinosinusitis       Health Maintenance Due   Topic Date Due    Hemoglobin A1c (Diabetic Prevention Screening)  2022    Shingles Vaccine (1 of 2) Never done    COVID-19 Vaccine (2 -  season) 2023       Objective   Vitals:    24 0836   BP: (P) 118/78   Pulse: 82       Physical Exam  Constitutional:       General: He is not in acute distress.     Appearance: Normal appearance. He is well-developed.   HENT:      Head: Normocephalic and atraumatic.      Right Ear: External ear normal.      Left Ear: External ear normal.   Eyes:      Conjunctiva/sclera: Conjunctivae normal.   Pulmonary:      Effort: Pulmonary effort is normal. No respiratory distress.   Abdominal:      General: Abdomen is flat. There is no distension.   Musculoskeletal:          General: No swelling or deformity.      Right lower leg: No edema.      Left lower leg: No edema.   Skin:     General: Skin is warm and dry.      Coloration: Skin is not jaundiced.   Neurological:      Mental Status: He is alert and oriented to person, place, and time. Mental status is at baseline.   Psychiatric:         Attention and Perception: Attention and perception normal.         Mood and Affect: Mood normal.         Speech: Speech normal.         Behavior: Behavior normal. Behavior is cooperative.         Thought Content: Thought content normal.         Cognition and Memory: Cognition normal.         Judgment: Judgment normal.         Assessment & Plan   1. Anxiety as acute reaction to exceptional stress  Assessment & Plan:  Stressing factors include lack of employment, recent health issues, and continuing war in [facility]. He doesn't feel the Cymbalta is working but he would like to keep it for analgesic effect. I will augment the Cymbalta with Wellbutrin.    Orders:  -     buPROPion (WELLBUTRIN) 75 MG tablet; Take 1 tablet (75 mg total) by mouth once daily for 7 days, THEN 1 tablet (75 mg total) 2 (two) times daily.  Dispense: 63 tablet; Refill: 0    2. Acute rhinosinusitis  -     predniSONE (DELTASONE) 20 MG tablet; Take 1 tablet (20 mg total) by mouth once daily. for 5 days  Dispense: 5 tablet; Refill: 0        Follow up in 6 weeks.    Shukri Flores MD  01/18/2024

## 2024-01-21 DIAGNOSIS — M54.50 CHRONIC LOW BACK PAIN WITHOUT SCIATICA, UNSPECIFIED BACK PAIN LATERALITY: ICD-10-CM

## 2024-01-21 DIAGNOSIS — G89.29 CHRONIC LOW BACK PAIN WITHOUT SCIATICA, UNSPECIFIED BACK PAIN LATERALITY: ICD-10-CM

## 2024-01-21 NOTE — TELEPHONE ENCOUNTER
No care due was identified.  [facility] Embedded Care Due Messages. Reference number: [id number].   1/21/2024 3:38:00 AM CST

## 2024-01-22 RX ORDER — GABAPENTIN 600 MG/1
600 TABLET ORAL 2 TIMES DAILY
Qty: 180 TABLET | Refills: 3 | Status: SHIPPED | OUTPATIENT
Start: 2024-01-22

## 2024-01-30 ENCOUNTER — PATIENT MESSAGE (OUTPATIENT)
Dept: FAMILY MEDICINE | Facility: CLINIC | Age: 49
End: 2024-01-30
Payer: OTHER GOVERNMENT

## 2024-01-30 NOTE — LETTER
January 31, 2024      [facility]  [address]  [place] [address]  Phone: [phone]  Fax: [phone]       Patient: Hi Salmeron   YOB: 1975  Date of Visit: 01/31/2024    To Whom It May Concern:    I am the primary care provider for Hi Salmeron. Our last visit was 1/18/2024. Below are his medications - he is currently medically and emotionally stable on this regimen:    Current Outpatient Medications:     ALPRAZolam (XANAX) 0.5 MG tablet, Take 1 tablet (0.5 mg total) by mouth 2 (two) times daily as needed for Anxiety., Disp: 28 tablet, Rfl: 1    buPROPion (WELLBUTRIN) 75 MG tablet, Take 1 tablet (75 mg total) by mouth once daily for 7 days, THEN 1 tablet (75 mg total) 2 (two) times daily., Disp: 63 tablet, Rfl: 0    diclofenac sodium (VOLTAREN) 1 % Gel, Apply 2 g topically 3 (three) times daily as needed (joint pain)., Disp: 20 g, Rfl: 3    DULoxetine (CYMBALTA) 30 MG capsule, TAKE 3 CAPSULES(90 MG) BY MOUTH TWICE DAILY, Disp: 90 capsule, Rfl: 1    fluticasone propionate (FLONASE) 50 mcg/actuation nasal spray, 1 spray (50 mcg total) by Each Nostril route 2 (two) times a day., Disp: 16 g, Rfl: 11    gabapentin (NEURONTIN) 600 MG tablet, TAKE 1 TABLET(600 MG) BY MOUTH TWICE DAILY, Disp: 180 tablet, Rfl: 3    ibuprofen (ADVIL,MOTRIN) 800 MG tablet, Take 800 mg by mouth daily as needed., Disp: , Rfl:     multivitamin (THERAGRAN) per tablet, Take 1 tablet by mouth once daily., Disp: , Rfl:      These medications are dispensed in quantities that allow for extended travel (at least 4 weeks). If you have any questions or concerns about the medications or Mr. Salmeron's care, please contact us.    Sincerely,    Shukri Flores MD      Discharged

## 2024-01-30 NOTE — LETTER
January 30, 2024      [facility]  [address]  [place] [address]  Phone: [phone]  Fax: [phone]       Patient: Hi Salmeron   YOB: 1975  Date of Visit: 01/30/2024    To Whom It May Concern:    I am the primary care provider for Hi Salmeron. Our last visit was 1/18/2024. Below are his medications - he is currently medically and emotionally stable on this regimen:    Current Outpatient Medications:     ALPRAZolam (XANAX) 0.5 MG tablet, Take 1 tablet (0.5 mg total) by mouth 2 (two) times daily as needed for Anxiety., Disp: 28 tablet, Rfl: 1    buPROPion (WELLBUTRIN) 75 MG tablet, Take 1 tablet (75 mg total) by mouth once daily for 7 days, THEN 1 tablet (75 mg total) 2 (two) times daily., Disp: 63 tablet, Rfl: 0    diclofenac sodium (VOLTAREN) 1 % Gel, Apply 2 g topically 3 (three) times daily as needed (joint pain)., Disp: 20 g, Rfl: 3    DULoxetine (CYMBALTA) 30 MG capsule, TAKE 3 CAPSULES(90 MG) BY MOUTH TWICE DAILY, Disp: 90 capsule, Rfl: 1    fluticasone propionate (FLONASE) 50 mcg/actuation nasal spray, 1 spray (50 mcg total) by Each Nostril route 2 (two) times a day., Disp: 16 g, Rfl: 11    gabapentin (NEURONTIN) 600 MG tablet, TAKE 1 TABLET(600 MG) BY MOUTH TWICE DAILY, Disp: 180 tablet, Rfl: 3    ibuprofen (ADVIL,MOTRIN) 800 MG tablet, Take 800 mg by mouth daily as needed., Disp: , Rfl:     multivitamin (THERAGRAN) per tablet, Take 1 tablet by mouth once daily., Disp: , Rfl:      If you have any questions or concerns about the medications or Mr. Salmeron's care, please contact us.      Sincerely,    Shukri Flores MD

## 2024-02-01 ENCOUNTER — LAB VISIT (OUTPATIENT)
Dept: LAB | Facility: HOSPITAL | Age: 49
End: 2024-02-01
Attending: STUDENT IN AN ORGANIZED HEALTH CARE EDUCATION/TRAINING PROGRAM
Payer: OTHER GOVERNMENT

## 2024-02-01 DIAGNOSIS — I15.8 OTHER SECONDARY HYPERTENSION: ICD-10-CM

## 2024-02-01 PROCEDURE — 36415 COLL VENOUS BLD VENIPUNCTURE: CPT | Mod: PO | Performed by: STUDENT IN AN ORGANIZED HEALTH CARE EDUCATION/TRAINING PROGRAM

## 2024-02-01 PROCEDURE — 80053 COMPREHEN METABOLIC PANEL: CPT | Performed by: STUDENT IN AN ORGANIZED HEALTH CARE EDUCATION/TRAINING PROGRAM

## 2024-02-02 LAB
ALBUMIN SERPL BCP-MCNC: 4.3 G/DL (ref 3.5–5.2)
ALP SERPL-CCNC: 57 U/L (ref 55–135)
ALT SERPL W/O P-5'-P-CCNC: 20 U/L (ref 10–44)
ANION GAP SERPL CALC-SCNC: 9 MMOL/L (ref 8–16)
AST SERPL-CCNC: 18 U/L (ref 10–40)
BILIRUB SERPL-MCNC: 0.6 MG/DL (ref 0.1–1)
BUN SERPL-MCNC: 18 MG/DL (ref 6–20)
CALCIUM SERPL-MCNC: 9.4 MG/DL (ref 8.7–10.5)
CHLORIDE SERPL-SCNC: 106 MMOL/L (ref 95–110)
CO2 SERPL-SCNC: 25 MMOL/L (ref 23–29)
CREAT SERPL-MCNC: 1 MG/DL (ref 0.5–1.4)
EST. GFR  (NO RACE VARIABLE): >60 ML/MIN/1.73 M^2
GLUCOSE SERPL-MCNC: 85 MG/DL (ref 70–110)
POTASSIUM SERPL-SCNC: 4.2 MMOL/L (ref 3.5–5.1)
PROT SERPL-MCNC: 7 G/DL (ref 6–8.4)
SODIUM SERPL-SCNC: 140 MMOL/L (ref 136–145)

## 2024-02-05 ENCOUNTER — PATIENT MESSAGE (OUTPATIENT)
Dept: OTOLARYNGOLOGY | Facility: CLINIC | Age: 49
End: 2024-02-05
Payer: OTHER GOVERNMENT

## 2024-02-05 ENCOUNTER — TELEPHONE (OUTPATIENT)
Dept: OTOLARYNGOLOGY | Facility: CLINIC | Age: 49
End: 2024-02-05
Payer: OTHER GOVERNMENT

## 2024-02-05 NOTE — TELEPHONE ENCOUNTER
Please advise how long after surgery will Mr Salmeron need to wait before traveling.    
(3) no apparent problem

## 2024-02-05 NOTE — TELEPHONE ENCOUNTER
----- Message from Omaira Clay sent at 2/5/2024  2:46 PM CST -----  Type:  Patient Returning Call    Who Called:Pt     Who Left Message for Patient:Danni Ramsey     Does the patient know what this is regarding?:yes to reschedule surgery    Would the patient rather a call back or a response via [facility]? Call back     Best Call Back Number:[phone] (mobile)     Additional Information: Pt is waiting on the line and a message was sent in teams at 2:42pm

## 2024-02-09 ENCOUNTER — OFFICE VISIT (OUTPATIENT)
Dept: PODIATRY | Facility: CLINIC | Age: 49
End: 2024-02-09
Payer: OTHER GOVERNMENT

## 2024-02-09 DIAGNOSIS — M79.671 FOOT PAIN, RIGHT: ICD-10-CM

## 2024-02-09 DIAGNOSIS — B07.0 PLANTAR WART OF RIGHT FOOT: Primary | ICD-10-CM

## 2024-02-09 PROCEDURE — 99213 OFFICE O/P EST LOW 20 MIN: CPT | Mod: S$PBB,,, | Performed by: PODIATRIST

## 2024-02-09 NOTE — PROGRESS NOTES
Subjective:      Patient ID: Hi Salmeron is a 48 y.o. male.    Chief Complaint: No chief complaint on file.  Patient presents to clinic for a follow up regarding the plantar wart of the Rt. Forefoot.  Notes continued build up with mild discomfort with weight bearing pressure.  Currently rates pain as a 2/10.  Notes applying Compound W to both this site and the area of porokeratosis build up.  Going forward, inquires as to how we should treat for full resolution of this issue.   Denies any additional pedal complaints.        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        Past Surgical History:   Procedure Laterality Date    BICEPS TENDON REPAIR      FOOT MASS EXCISION Right 2021    Procedure: EXCISION, MASS, FOOT;  Surgeon: Mamadou Bernal DPM;  Location: [facility] OR;  Service: Podiatry;  Laterality: Right;    UNDESCENDED TESTICLE EXPLORATION Left        Family History   Problem Relation Age of Onset    Breast cancer Mother 37    Cancer Mother     Hypertension Father     Heart attack Father     Leukemia Other         Nephew    Throat cancer Maternal Uncle     Blindness Neg Hx     Cataracts Neg Hx     Diabetes Neg Hx     Glaucoma Neg Hx     Macular degeneration Neg Hx     Retinal detachment Neg Hx     Strabismus Neg Hx     Stroke Neg Hx     Thyroid disease Neg Hx        Social History     Socioeconomic History    Marital status:    Occupational History     Employer: [employer]   Tobacco Use    Smoking status: Former     Current packs/day: 0.00     Average packs/day: 0.3 packs/day for 10.0 years (2.5 ttl pk-yrs)     Types: Cigarettes     Start date: 1995     Quit date: 2005     Years since quittin.1    Smokeless tobacco: Never   Substance and Sexual Activity    Alcohol use: Not Currently     Comment: At max, twice a kay but usually once every few months    Drug use: No    Sexual activity: Yes     Partners: Female     Birth  control/protection: None   Social History Narrative    Lives with wife and two children.      Social Determinants of Health     Financial Resource Strain: Low Risk  (1/15/2024)    Overall Financial Resource Strain (CARDIA)     Difficulty of Paying Living Expenses: Not hard at all   Food Insecurity: No Food Insecurity (1/15/2024)    Hunger Vital Sign     Worried About Running Out of Food in the Last Year: Never true     Ran Out of Food in the Last Year: Never true   Transportation Needs: No Transportation Needs (1/15/2024)    PRAPARE - Transportation     Lack of Transportation (Medical): No     Lack of Transportation (Non-Medical): No   Physical Activity: Insufficiently Active (1/15/2024)    Exercise Vital Sign     Days of Exercise per Week: 2 days     Minutes of Exercise per Session: 30 min   Stress: Stress Concern Present (1/15/2024)    [facility] - Occupational Stress Questionnaire     Feeling of Stress : Very much   Social Connections: Unknown (1/15/2024)    Social Connection and Isolation Panel [NHANES]     Frequency of Communication with Friends and Family: Once a week     Frequency of Social Gatherings with Friends and Family: Once a week     Active Member of Clubs or Organizations: No     Attends Club or Organization Meetings: Never     Marital Status:    Housing Stability: Low Risk  (1/15/2024)    Housing Stability Vital Sign     Unable to Pay for Housing in the Last Year: No     Number of Places Lived in the Last Year: 1     Unstable Housing in the Last Year: No       Current Outpatient Medications   Medication Sig Dispense Refill    ALPRAZolam (XANAX) 0.5 MG tablet Take 1 tablet (0.5 mg total) by mouth 2 (two) times daily as needed for Anxiety. 28 tablet 1    buPROPion (WELLBUTRIN) 75 MG tablet Take 1 tablet (75 mg total) by mouth once daily for 7 days, THEN 1 tablet (75 mg total) 2 (two) times daily. 63 tablet 0    diclofenac sodium (VOLTAREN) 1 % Gel Apply 2 g  topically 3 (three) times daily as needed (joint pain). 20 g 3    DULoxetine (CYMBALTA) 30 MG capsule TAKE 3 CAPSULES(90 MG) BY MOUTH TWICE DAILY 90 capsule 1    fluticasone propionate (FLONASE) 50 mcg/actuation nasal spray 1 spray (50 mcg total) by Each Nostril route 2 (two) times a day. 16 g 11    gabapentin (NEURONTIN) 600 MG tablet TAKE 1 TABLET(600 MG) BY MOUTH TWICE DAILY 180 tablet 3    ibuprofen (ADVIL,MOTRIN) 800 MG tablet Take 800 mg by mouth daily as needed.      multivitamin (THERAGRAN) per tablet Take 1 tablet by mouth once daily.       No current facility-administered medications for this visit.       Review of patient's allergies indicates:  No Known Allergies    Review of Systems   Constitutional: Negative for chills and fever.   Cardiovascular:  Negative for claudication and leg swelling.   Skin:  Positive for suspicious lesions. Negative for color change and nail changes.   Musculoskeletal:  Negative for joint pain, joint swelling, muscle cramps and muscle weakness.   Gastrointestinal:  Negative for nausea and vomiting.   Neurological:  Negative for numbness and paresthesias.   Psychiatric/Behavioral:  Negative for altered mental status.            Objective:      Physical Exam  Constitutional:       Appearance: Normal appearance. He is not ill-appearing.   Cardiovascular:      Pulses:           Dorsalis pedis pulses are 2+ on the right side and 2+ on the left side.        Posterior tibial pulses are 2+ on the right side and 2+ on the left side.      Comments: CFT is < 3 seconds bilateral.  Pedal hair growth is present bilateral.  No lower extremity edema noted bilateral.  Toes are warm to touch bilateral.    Musculoskeletal:         General: Tenderness present. No deformity or signs of injury.      Right lower leg: No edema.      Left lower leg: No edema.      Comments: Muscle strength 5/5 in all muscle groups bilateral.  No tenderness nor crepitation with ROM of foot/ankle joints bilateral.  Mild  pain with palpation to both a wart and porokeratosis slightly medial and inferior to the prior incision of the Rt. 4th plantar intermetatarsal space.     Skin:     General: Skin is warm and dry.      Capillary Refill: Capillary refill takes less than 2 seconds.      Findings: Lesion present. No bruising, ecchymosis, erythema, signs of injury, laceration, petechiae, rash or wound.      Comments: Pedal skin has normal turgor, temperature, and texture bilateral.  Small porokeratosis noted medial and inferior to the prior incision site of the Rt. Plantar 4th intermetatarsal space.  Roughly 3 mm proximal and lateral to this is a small plantar wart with intralesional capillaries.  This site is also devoid of intralesional skin lines.  Measures 0.3 x 0.3cm.    Both are mostly unchanged in comparison to the last exam.   Neurological:      Mental Status: He is alert.      Sensory: No sensory deficit.      Motor: No weakness or atrophy.      Comments: Light touch is intact bilateral.                 Assessment:       Encounter Diagnoses   Name Primary?    Plantar wart of right foot Yes    Foot pain, right          Plan:       Diagnoses and all orders for this visit:    Plantar wart of right foot    Foot pain, right      I counseled the patient on his conditions, their implications and medical management.    Advised to continue applying Maximum strength Compound W to the site and cover with an occlusive dressing QD.    With treatment, advised to let the site air dry if it becomes too macerated during the next three weeks.    RTC in roughly a month.  Will block out 40 minutes of time for surgical excision in office.      Mamadou Bernal DPM

## 2024-02-12 ENCOUNTER — PATIENT MESSAGE (OUTPATIENT)
Dept: FAMILY MEDICINE | Facility: CLINIC | Age: 49
End: 2024-02-12
Payer: OTHER GOVERNMENT

## 2024-02-12 DIAGNOSIS — M54.9 BACK PAIN, UNSPECIFIED BACK LOCATION, UNSPECIFIED BACK PAIN LATERALITY, UNSPECIFIED CHRONICITY: Primary | ICD-10-CM

## 2024-02-12 DIAGNOSIS — F41.1 ANXIETY AS ACUTE REACTION TO EXCEPTIONAL STRESS: ICD-10-CM

## 2024-02-12 DIAGNOSIS — F43.0 ANXIETY AS ACUTE REACTION TO EXCEPTIONAL STRESS: ICD-10-CM

## 2024-02-12 RX ORDER — METHOCARBAMOL 500 MG/1
TABLET, FILM COATED ORAL
Qty: 120 TABLET | Refills: 2 | Status: SHIPPED | OUTPATIENT
Start: 2024-02-12

## 2024-02-12 RX ORDER — ALPRAZOLAM 0.5 MG/1
0.5 TABLET ORAL 2 TIMES DAILY PRN
Qty: 28 TABLET | Refills: 1 | Status: SHIPPED | OUTPATIENT
Start: 2024-02-12

## 2024-02-12 NOTE — TELEPHONE ENCOUNTER
No care due was identified.  Health [facility] Embedded Care Due Messages. Reference number: [id number].   2/12/2024 2:02:49 PM CST

## 2024-02-12 NOTE — TELEPHONE ENCOUNTER
No care due was identified.  Health [facility] Embedded Care Due Messages. Reference number: [id number].   2/12/2024 3:36:15 PM CST

## 2024-02-19 DIAGNOSIS — F41.1 ANXIETY AS ACUTE REACTION TO EXCEPTIONAL STRESS: ICD-10-CM

## 2024-02-19 DIAGNOSIS — F43.0 ANXIETY AS ACUTE REACTION TO EXCEPTIONAL STRESS: ICD-10-CM

## 2024-02-19 RX ORDER — BUPROPION HYDROCHLORIDE 75 MG/1
75 TABLET ORAL 2 TIMES DAILY
Qty: 60 TABLET | Refills: 11 | Status: SHIPPED | OUTPATIENT
Start: 2024-02-19

## 2024-02-19 NOTE — TELEPHONE ENCOUNTER
No care due was identified.  Health [facility] Embedded Care Due Messages. Reference number: [id number].   2/19/2024 3:40:05 AM CST

## 2024-02-19 NOTE — TELEPHONE ENCOUNTER
Refill Routing Note   Medication(s) are not appropriate for processing by [facility] for the following reason(s):        New or recently adjusted medication    [facility] action(s):  Defer        Medication Therapy Plan: START DATE OF 1/18/2024      Appointments  past 12m or future 3m with PCP    Date Provider   Last Visit   1/18/2024 Shukri Flores MD   Next Visit   2/29/2024 Shukri Flores MD   ED visits in past 90 days: 0        Note composed:5:54 AM 02/19/2024

## 2024-03-02 NOTE — ADDENDUM NOTE
Addended by: OTTO MAN on: 9/12/2017 10:01 AM     Modules accepted: Orders      3-calculated by average/Not able to assess (calculate score using [facility] averaging method)

## 2024-03-12 ENCOUNTER — ANESTHESIA EVENT (OUTPATIENT)
Dept: SURGERY | Facility: HOSPITAL | Age: 49
End: 2024-03-12
Payer: OTHER GOVERNMENT

## 2024-03-13 ENCOUNTER — ANESTHESIA (OUTPATIENT)
Dept: SURGERY | Facility: HOSPITAL | Age: 49
End: 2024-03-13
Payer: OTHER GOVERNMENT

## 2024-03-13 ENCOUNTER — HOSPITAL ENCOUNTER (OUTPATIENT)
Facility: HOSPITAL | Age: 49
Discharge: HOME | End: 2024-03-13
Attending: STUDENT IN AN ORGANIZED HEALTH CARE EDUCATION/TRAINING PROGRAM | Admitting: STUDENT IN AN ORGANIZED HEALTH CARE EDUCATION/TRAINING PROGRAM
Payer: OTHER GOVERNMENT

## 2024-03-13 VITALS
OXYGEN SATURATION: 94 % | HEART RATE: 82 BPM | BODY MASS INDEX: 29.12 KG/M2 | WEIGHT: 208 LBS | HEIGHT: 71 IN | DIASTOLIC BLOOD PRESSURE: 79 MMHG | RESPIRATION RATE: 18 BRPM | SYSTOLIC BLOOD PRESSURE: 151 MMHG | TEMPERATURE: 97 F

## 2024-03-13 DIAGNOSIS — R51.9 LEFT-SIDED HEADACHE: ICD-10-CM

## 2024-03-13 DIAGNOSIS — R09.81 NASAL CONGESTION: ICD-10-CM

## 2024-03-13 DIAGNOSIS — J34.2 DNS (DEVIATED NASAL SEPTUM): Primary | ICD-10-CM

## 2024-03-13 PROCEDURE — 36000707: Mod: PO | Performed by: STUDENT IN AN ORGANIZED HEALTH CARE EDUCATION/TRAINING PROGRAM

## 2024-03-13 PROCEDURE — 63600175 PHARM REV CODE 636 W HCPCS: Mod: PO | Performed by: NURSE ANESTHETIST, CERTIFIED REGISTERED

## 2024-03-13 PROCEDURE — 30140 RESECT INFERIOR TURBINATE: CPT | Mod: 50,51,, | Performed by: STUDENT IN AN ORGANIZED HEALTH CARE EDUCATION/TRAINING PROGRAM

## 2024-03-13 PROCEDURE — 25000003 PHARM REV CODE 250: Mod: PO | Performed by: NURSE ANESTHETIST, CERTIFIED REGISTERED

## 2024-03-13 PROCEDURE — 36000706: Mod: PO | Performed by: STUDENT IN AN ORGANIZED HEALTH CARE EDUCATION/TRAINING PROGRAM

## 2024-03-13 PROCEDURE — D9220A PRA ANESTHESIA: Mod: ANES,,, | Performed by: ANESTHESIOLOGY

## 2024-03-13 PROCEDURE — 30520 REPAIR OF NASAL SEPTUM: CPT | Mod: ,,, | Performed by: STUDENT IN AN ORGANIZED HEALTH CARE EDUCATION/TRAINING PROGRAM

## 2024-03-13 PROCEDURE — D9220A PRA ANESTHESIA: Mod: CRNA,,, | Performed by: NURSE ANESTHETIST, CERTIFIED REGISTERED

## 2024-03-13 PROCEDURE — 71000033 HC RECOVERY, INTIAL HOUR: Mod: PO | Performed by: STUDENT IN AN ORGANIZED HEALTH CARE EDUCATION/TRAINING PROGRAM

## 2024-03-13 PROCEDURE — 37000008 HC ANESTHESIA 1ST 15 MINUTES: Mod: PO | Performed by: STUDENT IN AN ORGANIZED HEALTH CARE EDUCATION/TRAINING PROGRAM

## 2024-03-13 PROCEDURE — 63600175 PHARM REV CODE 636 W HCPCS: Mod: PO | Performed by: STUDENT IN AN ORGANIZED HEALTH CARE EDUCATION/TRAINING PROGRAM

## 2024-03-13 PROCEDURE — 25000003 PHARM REV CODE 250: Mod: PO | Performed by: STUDENT IN AN ORGANIZED HEALTH CARE EDUCATION/TRAINING PROGRAM

## 2024-03-13 PROCEDURE — 27201423 OPTIME MED/SURG SUP & DEVICES STERILE SUPPLY: Mod: PO | Performed by: STUDENT IN AN ORGANIZED HEALTH CARE EDUCATION/TRAINING PROGRAM

## 2024-03-13 PROCEDURE — 37000009 HC ANESTHESIA EA ADD 15 MINS: Mod: PO | Performed by: STUDENT IN AN ORGANIZED HEALTH CARE EDUCATION/TRAINING PROGRAM

## 2024-03-13 RX ORDER — ONDANSETRON HYDROCHLORIDE 2 MG/ML
4 INJECTION, SOLUTION INTRAVENOUS ONCE AS NEEDED
Status: COMPLETED | OUTPATIENT
Start: 2024-03-13 | End: 2024-03-13

## 2024-03-13 RX ORDER — EPINEPHRINE 1 MG/ML
INJECTION INTRAMUSCULAR; INTRAVENOUS; SUBCUTANEOUS
Status: DISCONTINUED | OUTPATIENT
Start: 2024-03-13 | End: 2024-03-13 | Stop reason: HOSPADM

## 2024-03-13 RX ORDER — FENTANYL CITRATE 50 UG/ML
INJECTION, SOLUTION INTRAMUSCULAR; INTRAVENOUS
Status: DISCONTINUED | OUTPATIENT
Start: 2024-03-13 | End: 2024-03-13

## 2024-03-13 RX ORDER — ROCURONIUM BROMIDE 10 MG/ML
INJECTION, SOLUTION INTRAVENOUS
Status: DISCONTINUED | OUTPATIENT
Start: 2024-03-13 | End: 2024-03-13

## 2024-03-13 RX ORDER — SODIUM CHLORIDE, SODIUM LACTATE, POTASSIUM CHLORIDE, CALCIUM CHLORIDE 600; 310; 30; 20 MG/100ML; MG/100ML; MG/100ML; MG/100ML
INJECTION, SOLUTION INTRAVENOUS CONTINUOUS
Status: DISCONTINUED | OUTPATIENT
Start: 2024-03-13 | End: 2024-03-13 | Stop reason: HOSPADM

## 2024-03-13 RX ORDER — HYDROCODONE BITARTRATE AND ACETAMINOPHEN 5; 325 MG/1; MG/1
1 TABLET ORAL EVERY 6 HOURS PRN
Qty: 15 TABLET | Refills: 0 | Status: SHIPPED | OUTPATIENT
Start: 2024-03-13 | End: 2024-03-18

## 2024-03-13 RX ORDER — LIDOCAINE HYDROCHLORIDE 10 MG/ML
1 INJECTION, SOLUTION EPIDURAL; INFILTRATION; INTRACAUDAL; PERINEURAL ONCE
Status: DISCONTINUED | OUTPATIENT
Start: 2024-03-13 | End: 2024-03-13 | Stop reason: HOSPADM

## 2024-03-13 RX ORDER — SODIUM CHLORIDE 0.9 % (FLUSH) 0.9 %
3 SYRINGE (ML) INJECTION
Status: DISCONTINUED | OUTPATIENT
Start: 2024-03-13 | End: 2024-03-13 | Stop reason: HOSPADM

## 2024-03-13 RX ORDER — OXYMETAZOLINE HCL 0.05 %
SPRAY, NON-AEROSOL (ML) NASAL
Status: DISCONTINUED | OUTPATIENT
Start: 2024-03-13 | End: 2024-03-13 | Stop reason: HOSPADM

## 2024-03-13 RX ORDER — ONDANSETRON 4 MG/1
4 TABLET, ORALLY DISINTEGRATING ORAL ONCE
Status: DISCONTINUED | OUTPATIENT
Start: 2024-03-13 | End: 2024-03-13 | Stop reason: HOSPADM

## 2024-03-13 RX ORDER — ONDANSETRON HYDROCHLORIDE 2 MG/ML
INJECTION, SOLUTION INTRAVENOUS
Status: DISCONTINUED | OUTPATIENT
Start: 2024-03-13 | End: 2024-03-13

## 2024-03-13 RX ORDER — DEXAMETHASONE SODIUM PHOSPHATE 4 MG/ML
INJECTION, SOLUTION INTRA-ARTICULAR; INTRALESIONAL; INTRAMUSCULAR; INTRAVENOUS; SOFT TISSUE
Status: DISCONTINUED | OUTPATIENT
Start: 2024-03-13 | End: 2024-03-13

## 2024-03-13 RX ORDER — MIDAZOLAM HYDROCHLORIDE 1 MG/ML
INJECTION, SOLUTION INTRAMUSCULAR; INTRAVENOUS
Status: DISCONTINUED | OUTPATIENT
Start: 2024-03-13 | End: 2024-03-13

## 2024-03-13 RX ORDER — OXYCODONE HYDROCHLORIDE 5 MG/1
5 TABLET ORAL
Status: DISCONTINUED | OUTPATIENT
Start: 2024-03-13 | End: 2024-03-13 | Stop reason: HOSPADM

## 2024-03-13 RX ORDER — PROPOFOL 10 MG/ML
VIAL (ML) INTRAVENOUS
Status: DISCONTINUED | OUTPATIENT
Start: 2024-03-13 | End: 2024-03-13

## 2024-03-13 RX ORDER — LIDOCAINE HYDROCHLORIDE 20 MG/ML
INJECTION INTRAVENOUS
Status: DISCONTINUED | OUTPATIENT
Start: 2024-03-13 | End: 2024-03-13

## 2024-03-13 RX ORDER — MUPIROCIN 20 MG/G
OINTMENT TOPICAL
Status: DISCONTINUED | OUTPATIENT
Start: 2024-03-13 | End: 2024-03-13 | Stop reason: HOSPADM

## 2024-03-13 RX ORDER — LIDOCAINE HYDROCHLORIDE AND EPINEPHRINE 10; 10 MG/ML; UG/ML
INJECTION, SOLUTION INFILTRATION; PERINEURAL
Status: DISCONTINUED | OUTPATIENT
Start: 2024-03-13 | End: 2024-03-13 | Stop reason: HOSPADM

## 2024-03-13 RX ORDER — ONDANSETRON 4 MG/1
4 TABLET, ORALLY DISINTEGRATING ORAL EVERY 6 HOURS PRN
Qty: 20 TABLET | Refills: 0 | Status: SHIPPED | OUTPATIENT
Start: 2024-03-13 | End: 2024-03-13

## 2024-03-13 RX ADMIN — MIDAZOLAM HYDROCHLORIDE 2 MG: 1 INJECTION, SOLUTION INTRAMUSCULAR; INTRAVENOUS at 09:03

## 2024-03-13 RX ADMIN — FENTANYL CITRATE 50 MCG: 50 INJECTION, SOLUTION INTRAMUSCULAR; INTRAVENOUS at 10:03

## 2024-03-13 RX ADMIN — ONDANSETRON 4 MG: 2 INJECTION INTRAMUSCULAR; INTRAVENOUS at 12:03

## 2024-03-13 RX ADMIN — FENTANYL CITRATE 50 MCG: 50 INJECTION, SOLUTION INTRAMUSCULAR; INTRAVENOUS at 11:03

## 2024-03-13 RX ADMIN — ROCURONIUM BROMIDE 40 MG: 10 INJECTION, SOLUTION INTRAVENOUS at 10:03

## 2024-03-13 RX ADMIN — LIDOCAINE HYDROCHLORIDE 100 MG: 20 INJECTION INTRAVENOUS at 10:03

## 2024-03-13 RX ADMIN — FENTANYL CITRATE 50 MCG: 50 INJECTION, SOLUTION INTRAMUSCULAR; INTRAVENOUS at 12:03

## 2024-03-13 RX ADMIN — PROPOFOL 200 MG: 10 INJECTION, EMULSION INTRAVENOUS at 10:03

## 2024-03-13 RX ADMIN — SUGAMMADEX 200 MG: 100 INJECTION, SOLUTION INTRAVENOUS at 12:03

## 2024-03-13 RX ADMIN — ONDANSETRON 4 MG: 2 INJECTION, SOLUTION INTRAMUSCULAR; INTRAVENOUS at 12:03

## 2024-03-13 RX ADMIN — SODIUM CHLORIDE, POTASSIUM CHLORIDE, SODIUM LACTATE AND CALCIUM CHLORIDE: 600; 310; 30; 20 INJECTION, SOLUTION INTRAVENOUS at 12:03

## 2024-03-13 RX ADMIN — SODIUM CHLORIDE, POTASSIUM CHLORIDE, SODIUM LACTATE AND CALCIUM CHLORIDE: 600; 310; 30; 20 INJECTION, SOLUTION INTRAVENOUS at 09:03

## 2024-03-13 RX ADMIN — DEXAMETHASONE SODIUM PHOSPHATE 12 MG: 4 INJECTION, SOLUTION INTRAMUSCULAR; INTRAVENOUS at 10:03

## 2024-03-13 RX ADMIN — FENTANYL CITRATE 50 MCG: 50 INJECTION, SOLUTION INTRAMUSCULAR; INTRAVENOUS at 09:03

## 2024-03-13 NOTE — ANESTHESIA PROCEDURE NOTES
Intubation    Date/Time: 3/13/2024 10:05 AM    Performed by: Neela Stark CRNA  Authorized by: Merrill Lee MD    Intubation:     Induction:  Intravenous    Intubated:  Postinduction    Mask Ventilation:  Easy mask    Attempts:  2 (First attempt with 8.0 ETT)    Attempted By:  CRNA    Method of Intubation:  Video laryngoscopy    Blade:  Fofana 3    Laryngeal View Grade: Grade I - full view of cords      Attempted By (2nd Attempt):  CRNA    Method of Intubation (2nd Attempt):  Video laryngoscopy    Blade (2nd Attempt):  Fofana 3    Laryngeal View Grade (2nd Attempt): Grade I - full view of cords      Difficult Airway Encountered?: No      Complications:  None    Airway Device:  Oral endotracheal tube    Airway Device Size:  7.0    Style/Cuff Inflation:  Cuffed (inflated to minimal occlusive pressure)    Tube secured:  22    Secured at:  The lips    Placement Verified By:  Capnometry    Complicating Factors:  None

## 2024-03-13 NOTE — DISCHARGE SUMMARY
[facility] - Surgery  Discharge Note  Short Stay    Procedure(s) (LRB):  SEPTOPLASTY, NOSE, WITH NASAL TURBINATE REDUCTION (Bilateral)      OUTCOME: Patient tolerated treatment/procedure well without complication and is now ready for discharge.    DISPOSITION: Home or Self Care    FINAL DIAGNOSIS:  <principal problem not specified>    FOLLOWUP: In clinic    DISCHARGE INSTRUCTIONS:  No discharge procedures on file.     TIME SPENT ON DISCHARGE: 10 minutes

## 2024-03-13 NOTE — H&P
Otolaryngology Clinic Note    Subjective:       Patient ID: Hi Salmeron is a 48 y.o. male.    Chief Complaint: No chief complaint on file.      History of Present Illness: Hi Salmeron is a 48 y.o. male presenting with DNS, was trying to get cosmetic dental work done but teeth grinds and was told he could not. Was told it was breathing related issue. He has always had trouble catching his breathe and cannot get enough air out of his nose. Any exercise, will mouth breathe. Not sure about tonsils in past. No broken nose. He does have sleep apnea.   Reports sleep study in 2022 at [facility]. 15+ pauses an hour. Just getting CPAP now from recall, worried about tolerating mask.   Has used afrin before, helps when sick. No other nasal sprays. No allergy medicines. Does not usually have nasal drip, some seasonal issues past couple of years. He does report worse left sided headaches.       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      Social Determinants of Health     Tobacco Use: Medium Risk (3/13/2024)    Patient History     Smoking Tobacco Use: Former     Smokeless Tobacco Use: Never     Passive Exposure: Not on file   Alcohol Use: Not At Risk (1/15/2024)    AUDIT-C     Frequency of Alcohol Consumption: 2-4 times a month     Average Number of Drinks: 1 or 2     Frequency of Binge Drinking: Never   Financial Resource Strain: Low Risk  (1/15/2024)    Overall Financial Resource Strain (CARDIA)     Difficulty of Paying Living Expenses: Not hard at all   Food Insecurity: No Food Insecurity (1/15/2024)    Hunger Vital Sign     Worried About Running Out of Food in the Last Year: Never true      Ran Out of Food in the Last Year: Never true   Transportation Needs: No Transportation Needs (1/15/2024)    PRAPARE - Transportation     Lack of Transportation (Medical): No     Lack of Transportation (Non-Medical): No   Physical Activity: Insufficiently Active (1/15/2024)    Exercise Vital Sign     Days of Exercise per Week: 2 days     Minutes of Exercise per Session: 30 min   Stress: Stress Concern Present (1/15/2024)    [employer] [employer] [facility] - Occupational Stress Questionnaire     Feeling of Stress : Very much   Social Connections: Unknown (1/15/2024)    Social Connection and Isolation Panel [NHANES]     Frequency of Communication with Friends and Family: Once a week     Frequency of Social Gatherings with Friends and Family: Once a week     Attends Confucianist Services: Not on file     Active Member of Clubs or Organizations: No     Attends Club or Organization Meetings: Never     Marital Status:    Housing Stability: Low Risk  (1/15/2024)    Housing Stability Vital Sign     Unable to Pay for Housing in the Last Year: No     Number of Places Lived in the Last Year: 1     Unstable Housing in the Last Year: No   Depression: Low Risk  (1/10/2023)    Depression     Last PHQ-4: Flowsheet Data: 1     Review of patient's allergies indicates:  No Known Allergies  Current Outpatient Medications   Medication Instructions    ALPRAZolam (XANAX) 0.5 mg, Oral, 2 times daily PRN    buPROPion (WELLBUTRIN) 75 mg, Oral, 2 times daily    diclofenac sodium (VOLTAREN) 2 g, Topical (Top), 3 times daily PRN    DULoxetine (CYMBALTA) 30 MG capsule TAKE 3 CAPSULES(90 MG) BY MOUTH TWICE DAILY    fluticasone propionate (FLONASE) 50 mcg, Each Nostril, 2 times daily    gabapentin (NEURONTIN) 600 mg, Oral, 2 times daily    ibuprofen (ADVIL,MOTRIN) 800 mg, Oral, Daily PRN    methocarbamoL (ROBAXIN) 500 MG Tab 1-2 tablets three times daily as needed    multivitamin (THERAGRAN) per tablet 1 tablet, Oral, Daily         ENT  ROS negative except as stated above.     Patient answers are not available for this visit.            Objective:      There were no vitals filed for this visit.    General: NAD, well appearing  Eyes: Normal conjunctiva and lids  Face: symmetric, nerve intact, maxilla with mild hypoplasia  Nose: The nose is without any evidence of any deformity. The nasal mucosa is moist. The septum is midline. There is no evidence of septal hematoma. The turbinates are without abnormality.   Ears: The ears are with normal-appearing pinna. Examination of the canals is normal appearing bilaterally. There is no drainage or erythema noted. The tympanic membranes are normal appearing with pearly color, normal-appearing landmarks and normal light reflex. Hearing is grossly intact.  Mouth: No obvious abnormalities to the lips. The teeth are unremarkable. The gingivae are without any obvious evidence of infection or lesion. The oral mucosa is moist and pink. There are no obvious masses to the hard or soft palate.   Oropharynx: The uvula is midline.  The tongue is midline. The posterior pharynx is without erythema or exudate. The tonsils are normal appearing.  Salivary glands: The salivary glands are symmetric and not enlarged, no masses  Neck: No lymphadenopathy, trachea midline, thryoid not enlarged.  Psych: Normal mood and affect.   Neuro: Grossly intact  Speech: fluent    scope    Septum is deviated left with spur contacting side wall. Nasal mucosa inflamed. Turbinates are large size and respond to decongestant appropriately. Right maxillary sinus and sphenoethmoidal recess without purulence or polyps. Left maxillary sinus and sphenoethmoidal recess without purulence or polyps. No adenoid hypertrophy.            Assessment and Plan:       DNS, nasal congestion        Offered medication management vs proceeding with surgery to assist with nasal airway and CPAP tolerance. He would like to proceed with surgery. He also may have contact  point headaches on left, so could help with this.     PSG, reports Ahi 15.09 2022.     RTC: 4 days post op    Plan of care was discussed in detail with the patient, who agreed with the plan as above. All questions were answered in detail.     Geovanna Wisdom MD  Otolaryngology

## 2024-03-13 NOTE — ANESTHESIA POSTPROCEDURE EVALUATION
Anesthesia Post Evaluation    Patient: Hi Salmeron    Procedure(s) Performed: Procedure(s) (LRB):  SEPTOPLASTY, NOSE, WITH NASAL TURBINATE REDUCTION (Bilateral)    Final Anesthesia Type: general      Patient location during evaluation: PACU  Patient participation: Yes- Able to Participate  Level of consciousness: awake and alert and oriented  Post-procedure vital signs: reviewed and stable  Pain management: adequate  Airway patency: patent    PONV status at discharge: No PONV  Anesthetic complications: no      Cardiovascular status: blood pressure returned to baseline and stable  Respiratory status: unassisted and spontaneous ventilation  Hydration status: euvolemic  Follow-up not needed.              Vitals Value Taken Time   /76 03/13/24 1256   Temp   03/13/24 1258   Pulse 91 03/13/24 1256   Resp 18 03/13/24 1256   SpO2 95 % 03/13/24 1256         No case tracking events are documented in the log.      Pain/Mk Score: Mk Score: 9 (3/13/2024 12:55 PM)

## 2024-03-13 NOTE — DISCHARGE INSTRUCTIONS
Septoplasty Post Operative Instructions      Slight trickling of blood in the back of your throat, or on your drip pad in front of your nose, and/or crusting of secretions for the first few days, is to be expected. You should change your drip pad periodically during the day as needed. This may be necessary for the first 3-5 days after surgery. You may also clean the hair-baring area of the nose daily, as needed, using a Q-tip and hydrogen peroxide. Keep your fingers out of your nose.    Diet: You may experience nausea after surgery. Avoid heavy meals on that day. Wait 24 hours to gradually resume your normal diet. Drink plenty of fluids to maintain hydration (6-8 glasses daily minimum). Avoid alcohol or caffeine the first week after surgery.    Restricted Activities: the first week after surgery, your sinuses will feel very full and congested, this is due to the accumulation of dried blood and/or mucus crust. Do not plan to blow your nose, use CPAP, bend over or lift heavy objects (over 10 lbs) for the first week after surgery. This may result in a nose bleed. Rest and do not exert or overheat yourself with exercise or other physical activity.    Post-Operative pain: pain medication (Percocet or Norco) may be ordered for significant post-operative pain, (FOR HEADACHES). You can still take extra-strength acetaminophen if the pain is not too uncomfortable, to avoid the occasional side-affects of nausea, constipation, or grogginess, associated with all narcotics. You may also alternate with ibuprofen. Use stool softener such as: dulcolax or Miralax to prevent constipation. Do not drive or handle heavy machinery while on narcotics. Throat discomfort is not too uncommon after endotracheal intubation during general anesthesia. This should resolve on it's own within 5 days. Throat lozenges or gargles tend to soothe the discomfort. Plane travel is allowed after your first post-op visit.    Do not take aspirin or  anti-coagulant therapy 2 days after surgery unless otherwise indicated by your surgeon at time of discharge. If you are on Coumadin you will likely restart your normal dosage 24 hours after surgery. However, check with your doctor first.    Use mupirocin ointment on qtip to each nostril twice daily until follow up. Spray saline nasal spray, over the counter, at least 3 times a day for 2 weeks.     If bright red bleeding occurs, can spray afrin to reduce as needed. Do not use this otherwise.     Post-operative Office Visits: The first visit will be to remove your splints. You can take a pain pill or motrin prior to this for any discomfort.     Signs and symptoms that MUST be reported to the physician/nurse immediately by calling  or clinic.  *Fever over 102 degree F. *Persistent bleeding with more that ¼ cup of bright red blood within a short period of time (half hour). *Severe pain unrelieved by prescribed medication. *Mental or visual changes (confusion, slurred speech, double- vision, visual loss).    * If you experience difficulty breathing, shortness of breath, or severe bleeding, call 911 or go to the nearest emergency room.

## 2024-03-13 NOTE — TRANSFER OF CARE
"Anesthesia Transfer of Care Note    Patient: Hi Salmeron    Procedure(s) Performed: Procedure(s) (LRB):  SEPTOPLASTY, NOSE, WITH NASAL TURBINATE REDUCTION (Bilateral)    Patient location: PACU    Anesthesia Type: general    Transport from OR: Transported from OR on room air with adequate spontaneous ventilation    Post pain: adequate analgesia    Post assessment: no apparent anesthetic complications and tolerated procedure well    Post vital signs: stable    Level of consciousness: responds to stimulation    Nausea/Vomiting: no nausea/vomiting    Complications: none    Transfer of care protocol was followed      Last vitals: Visit Vitals  /70   Pulse 70   Temp 36 °C (96.8 °F) (Skin)   Resp 16   Ht 5' 11" (1.803 m)   Wt 94.3 kg (208 lb)   SpO2 95%   BMI 29.01 kg/m²     "

## 2024-03-13 NOTE — OP NOTE
DATE OF OPERATION: 03/13/2024    PREOPERATIVE DIAGNOSES:  1. Nasal septal deviation.  2. Bilateral inferior turbinate hypertrophy.    POSTOPERATIVE DIAGNOSES:  1. Nasal septal deviation.  2. Bilateral inferior turbinate hypertrophy.    OPERATION PERFORMED:  1. Septoplasty.  2. Bilateral inferior turbinate microdebrider submucosal resection and outfracture.    SURGEON: Geovanna Wisdom mD    ANESTHESIA: General endotracheal anesthesia.    ESTIMATED BLOOD LOSS: 25 mL.    COMPLICATIONS: None.    OPERATIVE FINDINGS: Bony cartilaginous nasal septal deviation with large spur pushing into middle turbinate posteriorly and medially with  inferior turbinate hypertrophy bilaterally. Rent in several placed on left. One area on right. There is small area of overlap at mid inferior septum.     INDICATION FOR OPERATION: The patient is a 48 y.o. male with a history of chronic nasal obstruction. Office evaluation revealed nasal septal deviation with bilateral inferior turbinate hypertrophy. The patient has been treated with medical therapy, which has not resulted in improvement of his nasal obstruction. The risks, benefits, and alternatives of septoplasty and turbinoplasty were then emphasized on the risk of bleeding, infection, perforation, hyposmia and open nose syndrome were discussed with the patient who understood these risks and consented to the procedure.    DESCRIPTION OF OPERATION: After obtaining informed consent, the patient was brought to the operating room and placed supine on the operating table. General anesthesia with endotracheal intubation was induced without difficulty. The eyes were protected with ointment and tape.Lidocaine 1% with 1:100,000 epinephrine was injected into the soft tissue of the septum and lateral nasal wall. The patient was prepped and draped. Epinephrine soaked pledgets were placed in the nose and used for hemostasis throughout the case.     A sophia-transfixion incision was made in the left nasal  cavity. Dissection was carried down to the quadrangular cartilage, and the left mucoperichondrial flap was elevated back to the bony septum. The cartilage was scored anteriorly with care to preserve at least 1 cm of dorsal and caudal cartilaginous strut. This was carried through to the contralateral side, and the right mucoperichondrial flap was elevated. Cartilage knife was used to remove the large deviated portion of quadrangular cartilage, which was placed in saline for later use. Fred-Infante forceps were used to separate the superior and inferior portions of the bony septum posteriorly. A 4 mm osteotome was used to remove deviated portions off of the maxillary crest. Hemostasis was obtained at the bone cuts as needed with monopolar suction Bovie electrocautery. The initial mucosal incision was closed with interrupted chromic sutures. Mattress sutures done with same to reduce perforation overlap size.   Persistent bleeding was noted from mucosal injury at middle turbinate where spur was sticking. Anterior head was destabilized and removed. Suction bovie was used to cauterize.     Attention was then turned to the turbinoplasty. A stab incision was made in the face of the left inferior turbinate. Using a submucosal microdebrider blade, soft tissue was removed from inferior turbinate from anterior to posterior under direct visualization with zero degree scope. A Hurley was then used to outfracture the inferior turbinate. This procedure was repeated in identical fashion on the contralateral side. The Taylor silastic splints coated in antibiotic ointment were inserted into both nasal cavities and anchored in place with a transseptal prolene suture. The nasaopharynx was irrigated and gastric contents were suctioned. The patient tolerated the procedure well without complications.

## 2024-03-13 NOTE — PLAN OF CARE
Patient states his sleep schedule is not normal due to traveling abroad in the past 48 hours so it is difficult for him to feel as awake as he normally would after a procedure. Patient vitals stable, patient answers appropriately and nausea has abated. Preparing for discharge.

## 2024-03-13 NOTE — ANESTHESIA PREPROCEDURE EVALUATION
03/13/2024  Hi Salmeron is a 48 y.o., male.    Anesthesia Evaluation    I have reviewed the Patient Summary Reports.     I have reviewed the Nursing Notes. I have reviewed the NPO Status.   I have reviewed the Medications.     Review of Systems  Anesthesia Hx:  No problems with previous Anesthesia                Hematology/Oncology:                   Hematology Comments: G6PD deficiency                    EENT/Dental:   Deviated septum          Cardiovascular:  Cardiovascular Normal                  ECG has been reviewed. EST negative in past                         Renal/:  Renal/ Normal                 Hepatic/GI:  Hepatic/GI Normal                 Neurological:  Neurology Normal                                      Endocrine:  Endocrine Normal          Obesity / BMI > 30  Psych:   anxiety                 Physical Exam  General:  Well nourished       Airway/Jaw/Neck:  Airway Findings: Mouth Opening: Normal     Tongue: Normal           Mallampati: I   TM Distance: Normal, at least 6 cm   Jaw/Neck Findings:     Neck ROM: Normal ROM         Eyes/Ears/Nose:  Eyes/Ears/Nose Findings:     Dental:  Dental Findings: In tact      Chest/Lungs:  Chest/Lungs Findings:  Normal Respiratory Rate, Clear to auscultation       Heart/Vascular:  Heart Findings: Rate: Normal  Rhythm: Regular Rhythm                        Mental Status:  Mental Status Findings:  Cooperative, Alert and Oriented         Anesthesia Plan  Type of Anesthesia, risks & benefits discussed:  Anesthesia Type:  general    Patient's Preference: MAC  Plan Factors:          Intra-op Monitoring Plan: standard ASA monitors  Intra-op Monitoring Plan Comments:   Post Op Pain Control Plan: multimodal analgesia, IV/PO Opioids PRN and per primary service following discharge from PACU  Post Op Pain Control Plan Comments:     Induction:   IV and Inhalation  Beta  Blocker:  Patient is not currently on a Beta-Blocker (No further documentation required).       Informed Consent: Informed consent signed with the Patient and all parties understand the risks and agree with anesthesia plan.  All questions answered.  Anesthesia consent signed with patient.  ASA Score: 1     Day of Surgery Review of History & Physical: I have interviewed and examined the patient. I have reviewed the patient's H&P dated:  There are no significant changes.            Ready For Surgery From Anesthesia Perspective.             Physical Exam  General: Well nourished    Airway:  Mallampati: I   Mouth Opening: Normal  TM Distance: Normal, at least 6 cm  Tongue: Normal  Neck ROM: Normal ROM    Dental:  In tact    Chest/Lungs:  Normal Respiratory Rate, Clear to auscultation    Heart:  Rate: Normal  Rhythm: Regular Rhythm        Anesthesia Plan  Type of Anesthesia, risks & benefits discussed:    Anesthesia Type: general  Intra-op Monitoring Plan: standard ASA monitors  Post Op Pain Control Plan: multimodal analgesia, IV/PO Opioids PRN and per primary service following discharge from PACU  Induction:  IV and Inhalation  Informed Consent: Informed consent signed with the Patient and all parties understand the risks and agree with anesthesia plan.  All questions answered.   ASA Score: 1  Day of Surgery Review of History & Physical: I have interviewed and examined the patient. I have reviewed the patient's H&P dated:     Ready For Surgery From Anesthesia Perspective.     .

## 2024-03-18 ENCOUNTER — OFFICE VISIT (OUTPATIENT)
Dept: OTOLARYNGOLOGY | Facility: CLINIC | Age: 49
End: 2024-03-18
Payer: OTHER GOVERNMENT

## 2024-03-18 VITALS — WEIGHT: 206.13 LBS | HEIGHT: 71 IN | BODY MASS INDEX: 28.86 KG/M2

## 2024-03-18 DIAGNOSIS — Z98.890 STATUS POST NASAL SEPTOPLASTY: Primary | ICD-10-CM

## 2024-03-18 PROCEDURE — 99024 POSTOP FOLLOW-UP VISIT: CPT | Mod: ,,, | Performed by: STUDENT IN AN ORGANIZED HEALTH CARE EDUCATION/TRAINING PROGRAM

## 2024-03-18 PROCEDURE — 99999 PR PBB SHADOW E&M-EST. PATIENT-LVL III: CPT | Mod: PBBFAC,,, | Performed by: STUDENT IN AN ORGANIZED HEALTH CARE EDUCATION/TRAINING PROGRAM

## 2024-03-18 PROCEDURE — 99213 OFFICE O/P EST LOW 20 MIN: CPT | Mod: PBBFAC,PO | Performed by: STUDENT IN AN ORGANIZED HEALTH CARE EDUCATION/TRAINING PROGRAM

## 2024-03-18 NOTE — PROGRESS NOTES
ENT POST OP    Septo/turb reduction         DATE OF OPERATION: 03/13/2024     PREOPERATIVE DIAGNOSES:  1. Nasal septal deviation.  2. Bilateral inferior turbinate hypertrophy.     OPERATION PERFORMED:  1. Septoplasty.  2. Bilateral inferior turbinate microdebrider submucosal resection and outfracture.     OPERATIVE FINDINGS: Bony cartilaginous nasal septal deviation with large spur pushing into middle turbinate posteriorly and medially with  inferior turbinate hypertrophy bilaterally. Rent in several placed on left. One area on right. There is small area of overlap at mid inferior septum.        S: Front teeth numb. Pain is ok, stents bothersome. Bleeding/drainage slowing. Top lip has been swollen.     Exam:   External nose normal  Stents removed. Dusky mucosa noted mid septum on left but not right. Scabbing along inferior turbinates.     Saline until follow up  No nose blowing, lifting, straining full 7 days, light activity, gently nose blowing for second week, then back to normal.   Follow up 2 weeks for scope.     Geovanna Wisdom MD

## 2024-03-28 ENCOUNTER — OFFICE VISIT (OUTPATIENT)
Dept: OTOLARYNGOLOGY | Facility: CLINIC | Age: 49
End: 2024-03-28
Payer: OTHER GOVERNMENT

## 2024-03-28 VITALS — BODY MASS INDEX: 28.86 KG/M2 | HEIGHT: 71 IN | WEIGHT: 206.13 LBS

## 2024-03-28 DIAGNOSIS — Z98.890 STATUS POST NASAL SEPTOPLASTY: Primary | ICD-10-CM

## 2024-03-28 PROCEDURE — 99213 OFFICE O/P EST LOW 20 MIN: CPT | Mod: 25,PBBFAC,PO | Performed by: STUDENT IN AN ORGANIZED HEALTH CARE EDUCATION/TRAINING PROGRAM

## 2024-03-28 PROCEDURE — 99024 POSTOP FOLLOW-UP VISIT: CPT | Mod: S$PBB,,, | Performed by: STUDENT IN AN ORGANIZED HEALTH CARE EDUCATION/TRAINING PROGRAM

## 2024-03-28 PROCEDURE — 31237 NSL/SINS NDSC SURG BX POLYPC: CPT | Mod: PBBFAC,PO | Performed by: STUDENT IN AN ORGANIZED HEALTH CARE EDUCATION/TRAINING PROGRAM

## 2024-03-28 PROCEDURE — 99999 PR PBB SHADOW E&M-EST. PATIENT-LVL III: CPT | Mod: PBBFAC,,, | Performed by: STUDENT IN AN ORGANIZED HEALTH CARE EDUCATION/TRAINING PROGRAM

## 2024-03-28 PROCEDURE — 31237 NSL/SINS NDSC SURG BX POLYPC: CPT | Mod: 79,50,S$PBB, | Performed by: STUDENT IN AN ORGANIZED HEALTH CARE EDUCATION/TRAINING PROGRAM

## 2024-03-28 RX ORDER — PREDNISONE 20 MG/1
TABLET ORAL
COMMUNITY
Start: 2024-01-18

## 2024-03-28 NOTE — PROGRESS NOTES
ENT POST OP    Septo/turb reduction         DATE OF OPERATION: 03/13/2024     PREOPERATIVE DIAGNOSES:  1. Nasal septal deviation.  2. Bilateral inferior turbinate hypertrophy.     OPERATION PERFORMED:  1. Septoplasty.  2. Bilateral inferior turbinate microdebrider submucosal resection and outfracture.     OPERATIVE FINDINGS: Bony cartilaginous nasal septal deviation with large spur pushing into middle turbinate posteriorly and medially with  inferior turbinate hypertrophy bilaterally. Rent in several placed on left. One area on right. There is small area of overlap at mid inferior septum.        S: Pain better. Can breathe and smell better. Nose is drippy. No blood. Stopped saline 3 days ago.     Exam:   External nose normal    Nasal Endoscopy with Debridement    Nasal endoscopy with debridement was performed in accordance with the 0 day global period for endoscopic sinus surgery and are unrelated to any other recently performed procedures.     Topical Agents: Topical 0.25% phenylephrine and 4% lidocaine    A 30 degree rigid nasal endoscope was inserted into the nose to visualize the nasal passageway and paranasal sinuses. Under endoscopic visualization, a combination of instruments including suction and grasping forceps were used to debride crust, debris, inflammatory tissue and nasal polyps from the nasal cavity, paranasal sinuses and sinus drainage pathways. This was performed to aid in healing and optimize the patency and function of the sinus cavities and nasal passageways. This is necessary to avoid scar formation, infection, and mucocele formation. In addition, this facilitates in the optimal instillation of topical therapies, saline irrigations, long-term disease surveillance, and endoscopically-derived cultures. Examination of the inferior turbinates, middle turbinates, middle meatus, operated sinuses, sphenoethmoidal recess, and nasopharynx was undertaken. All findings were normal for this stage in healing  and included:    LEFT: large crusting along turbinate and middle turbinate removed. Stuck to septal stitches, which were trimmed. Healing as expected under this. Turbinate reduced. Middle turbinate head with scabbing that could not be removed. Septum midline.   RIGHT: small crusting along inferior turbinate removed. Healing well. Turbinate reduced.        A/P:   Saline BID next month  RTC 4 weeks        Geovanna Wisdom MD

## 2024-04-22 PROBLEM — J01.90 ACUTE RHINOSINUSITIS: Status: RESOLVED | Noted: 2024-01-18 | Resolved: 2024-04-22

## 2024-04-29 ENCOUNTER — OFFICE VISIT (OUTPATIENT)
Dept: OTOLARYNGOLOGY | Facility: CLINIC | Age: 49
End: 2024-04-29
Payer: OTHER GOVERNMENT

## 2024-04-29 VITALS — HEIGHT: 71 IN | BODY MASS INDEX: 29.72 KG/M2 | WEIGHT: 212.31 LBS

## 2024-04-29 DIAGNOSIS — Z98.890 STATUS POST NASAL SEPTOPLASTY: Primary | ICD-10-CM

## 2024-04-29 PROCEDURE — 31237 NSL/SINS NDSC SURG BX POLYPC: CPT | Mod: PBBFAC,PO | Performed by: STUDENT IN AN ORGANIZED HEALTH CARE EDUCATION/TRAINING PROGRAM

## 2024-04-29 PROCEDURE — 99999 PR PBB SHADOW E&M-EST. PATIENT-LVL III: CPT | Mod: PBBFAC,,, | Performed by: STUDENT IN AN ORGANIZED HEALTH CARE EDUCATION/TRAINING PROGRAM

## 2024-04-29 PROCEDURE — 99024 POSTOP FOLLOW-UP VISIT: CPT | Mod: ,,, | Performed by: STUDENT IN AN ORGANIZED HEALTH CARE EDUCATION/TRAINING PROGRAM

## 2024-04-29 PROCEDURE — 99213 OFFICE O/P EST LOW 20 MIN: CPT | Mod: PBBFAC,PO | Performed by: STUDENT IN AN ORGANIZED HEALTH CARE EDUCATION/TRAINING PROGRAM

## 2024-04-29 PROCEDURE — 31237 NSL/SINS NDSC SURG BX POLYPC: CPT | Mod: 50,79,S$PBB, | Performed by: STUDENT IN AN ORGANIZED HEALTH CARE EDUCATION/TRAINING PROGRAM

## 2024-05-21 ENCOUNTER — PATIENT MESSAGE (OUTPATIENT)
Dept: ADMINISTRATIVE | Facility: HOSPITAL | Age: 49
End: 2024-05-21
Payer: OTHER GOVERNMENT

## 2024-05-27 ENCOUNTER — PATIENT OUTREACH (OUTPATIENT)
Dept: ADMINISTRATIVE | Facility: HOSPITAL | Age: 49
End: 2024-05-27
Payer: OTHER GOVERNMENT

## 2024-05-27 NOTE — PROGRESS NOTES
Population Health Chart Review & Patient Outreach Details      Additional [facility] Health Notes:               Updates Requested / Reviewed:      Updated Care Coordination Note, , and Immunizations Reconciliation Completed or Queried: [place]         Health Maintenance Topics Overdue:      [facility] Score: 1     Hemoglobin A1c                       Health Maintenance Topic(s) Outreach Outcomes & Actions Taken:    Colorectal Cancer Screening - Outreach Outcomes & Actions Taken  : Portal message sent

## 2024-05-29 ENCOUNTER — OFFICE VISIT (OUTPATIENT)
Dept: OTOLARYNGOLOGY | Facility: CLINIC | Age: 49
End: 2024-05-29
Payer: OTHER GOVERNMENT

## 2024-05-29 VITALS — WEIGHT: 205 LBS | HEIGHT: 71 IN | BODY MASS INDEX: 28.7 KG/M2

## 2024-05-29 DIAGNOSIS — Z98.890 STATUS POST NASAL SEPTOPLASTY: Primary | ICD-10-CM

## 2024-05-29 PROCEDURE — 99999 PR PBB SHADOW E&M-EST. PATIENT-LVL III: CPT | Mod: PBBFAC,,, | Performed by: STUDENT IN AN ORGANIZED HEALTH CARE EDUCATION/TRAINING PROGRAM

## 2024-05-29 PROCEDURE — 99024 POSTOP FOLLOW-UP VISIT: CPT | Mod: ,,, | Performed by: STUDENT IN AN ORGANIZED HEALTH CARE EDUCATION/TRAINING PROGRAM

## 2024-05-29 PROCEDURE — 31231 NASAL ENDOSCOPY DX: CPT | Mod: 79,S$PBB,, | Performed by: STUDENT IN AN ORGANIZED HEALTH CARE EDUCATION/TRAINING PROGRAM

## 2024-05-29 PROCEDURE — 31231 NASAL ENDOSCOPY DX: CPT | Mod: PBBFAC,PO | Performed by: STUDENT IN AN ORGANIZED HEALTH CARE EDUCATION/TRAINING PROGRAM

## 2024-05-29 PROCEDURE — 99213 OFFICE O/P EST LOW 20 MIN: CPT | Mod: PBBFAC,PO | Performed by: STUDENT IN AN ORGANIZED HEALTH CARE EDUCATION/TRAINING PROGRAM

## 2024-05-29 NOTE — PROGRESS NOTES
ENT POST OP    Septo/turb reduction         DATE OF OPERATION: 03/13/2024     PREOPERATIVE DIAGNOSES:  1. Nasal septal deviation.  2. Bilateral inferior turbinate hypertrophy.     OPERATION PERFORMED:  1. Septoplasty.  2. Bilateral inferior turbinate microdebrider submucosal resection and outfracture.     OPERATIVE FINDINGS: Bony cartilaginous nasal septal deviation with large spur pushing into middle turbinate posteriorly and medially with  inferior turbinate hypertrophy bilaterally. Rent in several placed on left. One area on right. There is small area of overlap at mid inferior septum.        S: he can breathe well. Mild output of crusts with saline. Smell is better than before.     Exam:   External nose normal    Nasal Endoscopy    Topical Agents: Topical 0.25% phenylephrine and 4% lidocaine    A 0 degree rigid nasal endoscope was inserted into the nose to visualize the nasal passageway and paranasal sinuses. Under endoscopic visualization, a combination of instruments including suction and grasping forceps were used to debride crust, debris, inflammatory tissue and nasal polyps from the nasal cavity, paranasal sinuses and sinus drainage pathways. This was performed to aid in healing and optimize the patency and function of the sinus cavities and nasal passageways. This is necessary to avoid scar formation, infection, and mucocele formation. In addition, this facilitates in the optimal instillation of topical therapies, saline irrigations, long-term disease surveillance, and endoscopically-derived cultures. Examination of the inferior turbinates, middle turbinates, middle meatus, operated sinuses, sphenoethmoidal recess, and nasopharynx was undertaken. All findings were normal for this stage in healing and included:    LEFT: lmiddle turbinate head healed. Small perforation mid septum.. Inferior turbinate reduced.   RIGHT: healed well. Turbinate reduced.        A/P:   Doing well.   Saline PRN  RTC PRN      Geovanna  JESUS Wisdom MD

## 2024-07-23 ENCOUNTER — TELEPHONE (OUTPATIENT)
Dept: FAMILY MEDICINE | Facility: CLINIC | Age: 49
End: 2024-07-23
Payer: OTHER GOVERNMENT

## 2024-07-23 NOTE — TELEPHONE ENCOUNTER
----- Message from Dagmar Santiago sent at 7/23/2024 10:25 AM CDT -----  Contact: Patient  Type:  Appointment Request      Name of Caller:Patient     When is the first available appointment?None    Symptoms:Pain in hand     Would the patient rather a call back or a response via [facility]? Call    Best Call Back Number:[phone] (home) [phone] (work)    Additional Information: Please call to advise

## 2024-08-02 ENCOUNTER — OFFICE VISIT (OUTPATIENT)
Dept: FAMILY MEDICINE | Facility: CLINIC | Age: 49
End: 2024-08-02
Payer: OTHER GOVERNMENT

## 2024-08-02 VITALS
HEART RATE: 74 BPM | OXYGEN SATURATION: 96 % | DIASTOLIC BLOOD PRESSURE: 74 MMHG | BODY MASS INDEX: 28.66 KG/M2 | SYSTOLIC BLOOD PRESSURE: 122 MMHG | WEIGHT: 205.5 LBS

## 2024-08-02 DIAGNOSIS — G89.29 CHRONIC NECK PAIN: Primary | ICD-10-CM

## 2024-08-02 DIAGNOSIS — F43.0 ANXIETY AS ACUTE REACTION TO EXCEPTIONAL STRESS: ICD-10-CM

## 2024-08-02 DIAGNOSIS — F41.1 ANXIETY AS ACUTE REACTION TO EXCEPTIONAL STRESS: ICD-10-CM

## 2024-08-02 DIAGNOSIS — M54.2 CHRONIC NECK PAIN: Primary | ICD-10-CM

## 2024-08-02 PROCEDURE — 99213 OFFICE O/P EST LOW 20 MIN: CPT | Mod: PBBFAC,PO

## 2024-08-02 PROCEDURE — 99999 PR PBB SHADOW E&M-EST. PATIENT-LVL III: CPT | Mod: PBBFAC,,,

## 2024-08-02 RX ORDER — TIZANIDINE 4 MG/1
4 TABLET ORAL EVERY 8 HOURS PRN
Qty: 30 TABLET | Refills: 0 | Status: SHIPPED | OUTPATIENT
Start: 2024-08-02 | End: 2024-08-12

## 2024-08-02 RX ORDER — KETOROLAC TROMETHAMINE 30 MG/ML
30 INJECTION, SOLUTION INTRAMUSCULAR; INTRAVENOUS
Status: COMPLETED | OUTPATIENT
Start: 2024-08-02 | End: 2024-08-02

## 2024-08-02 RX ORDER — ALPRAZOLAM 0.5 MG/1
0.5 TABLET ORAL 2 TIMES DAILY PRN
Qty: 30 TABLET | Refills: 0 | Status: SHIPPED | OUTPATIENT
Start: 2024-08-02

## 2024-08-02 RX ORDER — TRAMADOL HYDROCHLORIDE 50 MG/1
50 TABLET ORAL EVERY 8 HOURS PRN
Qty: 20 TABLET | Refills: 0 | Status: SHIPPED | OUTPATIENT
Start: 2024-08-02 | End: 2024-08-07

## 2024-08-02 RX ADMIN — KETOROLAC TROMETHAMINE 30 MG: 30 INJECTION, SOLUTION INTRAMUSCULAR; INTRAVENOUS at 04:08

## 2024-08-02 NOTE — PROGRESS NOTES
Name: Hi Salmeron  MRN: [id number]  : 1975  PCP: Shukri Flores MD    HPI    Patient follows with Dr. Flores, new to me. He presents for chronic neck pain that has worsened over the last 5 days. He does take gabapentin and robaxin with no significant improvement in his neck pain. He reports he feels as though he slept wrong which made his chronic neck pain flare up. He had gone to PT in the past for chronic neck pain which did help however he has not gone to PT in quite some time. He reports pain is located to his left side of his neck. He reports he is unable to look to his left due to the pain.     He would also like a refill on his anxiety medication. He reports the [employer] conflict has caused some increase stress and anxiety.      Review of Systems   Respiratory:  Negative for shortness of breath.    Cardiovascular:  Negative for chest pain and palpitations.   Gastrointestinal:  Negative for nausea and vomiting.   Musculoskeletal:  Positive for neck pain.   Neurological:  Negative for weakness and numbness.       Patient Active Problem List   Diagnosis    Chronic neck pain    Neck pain    Anxiety as acute reaction to exceptional stress    Sprain of lateral collateral ligament of right knee    Strain of right hamstring    Open wound of plantar aspect of foot    Chronic pain of right knee    Chronic low back pain    Reactive depression    Joint pain in fingers of right hand       Vitals:    24 1604   BP: 122/74   Pulse: 74       Physical Exam  Constitutional:       General: He is not in acute distress.     Appearance: He is well-developed.   HENT:      Head: Normocephalic and atraumatic.      Right Ear: External ear normal.      Left Ear: External ear normal.   Eyes:      Conjunctiva/sclera: Conjunctivae normal.      Pupils: Pupils are equal, round, and reactive to light.   Neck:      Thyroid: No thyromegaly.   Cardiovascular:      Rate and Rhythm: Normal rate and regular rhythm.      Pulses:  Normal pulses.      Heart sounds: Normal heart sounds, S1 normal and S2 normal.   Pulmonary:      Effort: Pulmonary effort is normal. No respiratory distress.      Breath sounds: Normal breath sounds.   Chest:      Chest wall: No tenderness.   Musculoskeletal:         General: Tenderness (tenderness with left neck rotation) present. No swelling. Normal range of motion.      Cervical back: Normal range of motion and neck supple.   Skin:     General: Skin is warm and dry.      Coloration: Skin is not jaundiced or pale.   Neurological:      General: No focal deficit present.      Mental Status: He is alert and oriented to person, place, and time.      Cranial Nerves: No cranial nerve deficit.   Psychiatric:         Mood and Affect: Mood normal.         Behavior: Behavior normal.         1. Chronic neck pain  -     ketorolac injection 30 mg  -     tiZANidine (ZANAFLEX) 4 MG tablet; Take 1 tablet (4 mg total) by mouth every 8 (eight) hours as needed.  Dispense: 30 tablet; Refill: 0  -     traMADoL (ULTRAM) 50 mg tablet; Take 1 tablet (50 mg total) by mouth every 8 (eight) hours as needed for Pain.  Dispense: 20 tablet; Refill: 0    2. Anxiety as acute reaction to exceptional stress  -     ALPRAZolam (XANAX) 0.5 MG tablet; Take 1 tablet (0.5 mg total) by mouth 2 (two) times daily as needed for Anxiety.  Dispense: 30 tablet; Refill: 0        Follow up as needed      JT Duque  08/02/2024

## 2024-08-05 ENCOUNTER — PATIENT MESSAGE (OUTPATIENT)
Dept: FAMILY MEDICINE | Facility: CLINIC | Age: 49
End: 2024-08-05
Payer: OTHER GOVERNMENT

## 2024-08-06 ENCOUNTER — OFFICE VISIT (OUTPATIENT)
Dept: FAMILY MEDICINE | Facility: CLINIC | Age: 49
End: 2024-08-06
Payer: OTHER GOVERNMENT

## 2024-08-06 DIAGNOSIS — M54.2 CHRONIC NECK PAIN: ICD-10-CM

## 2024-08-06 DIAGNOSIS — F41.1 ANXIETY AS ACUTE REACTION TO EXCEPTIONAL STRESS: ICD-10-CM

## 2024-08-06 DIAGNOSIS — F43.0 ANXIETY AS ACUTE REACTION TO EXCEPTIONAL STRESS: ICD-10-CM

## 2024-08-06 DIAGNOSIS — F32.9 REACTIVE DEPRESSION: Primary | ICD-10-CM

## 2024-08-06 DIAGNOSIS — G89.29 CHRONIC NECK PAIN: ICD-10-CM

## 2024-08-06 PROCEDURE — 99214 OFFICE O/P EST MOD 30 MIN: CPT | Mod: 95,,, | Performed by: STUDENT IN AN ORGANIZED HEALTH CARE EDUCATION/TRAINING PROGRAM

## 2024-08-06 RX ORDER — TIZANIDINE 4 MG/1
4 TABLET ORAL EVERY 8 HOURS PRN
Qty: 90 TABLET | Refills: 11 | Status: SHIPPED | OUTPATIENT
Start: 2024-08-06

## 2024-08-06 RX ORDER — MELOXICAM 15 MG/1
15 TABLET ORAL DAILY
Qty: 30 TABLET | Refills: 11 | Status: SHIPPED | OUTPATIENT
Start: 2024-08-06

## 2024-08-06 RX ORDER — BUPROPION HYDROCHLORIDE 300 MG/1
300 TABLET ORAL DAILY
Qty: 30 TABLET | Refills: 11 | Status: SHIPPED | OUTPATIENT
Start: 2024-08-06 | End: 2025-08-06

## 2024-08-08 ENCOUNTER — PATIENT MESSAGE (OUTPATIENT)
Dept: PODIATRY | Facility: CLINIC | Age: 49
End: 2024-08-08
Payer: OTHER GOVERNMENT

## 2024-08-21 ENCOUNTER — OFFICE VISIT (OUTPATIENT)
Dept: PODIATRY | Facility: CLINIC | Age: 49
End: 2024-08-21
Payer: OTHER GOVERNMENT

## 2024-08-21 ENCOUNTER — PATIENT MESSAGE (OUTPATIENT)
Dept: PODIATRY | Facility: CLINIC | Age: 49
End: 2024-08-21
Payer: OTHER GOVERNMENT

## 2024-08-21 VITALS — BODY MASS INDEX: 28.77 KG/M2 | HEIGHT: 71 IN | WEIGHT: 205.5 LBS

## 2024-08-21 DIAGNOSIS — M79.671 FOOT PAIN, RIGHT: Primary | ICD-10-CM

## 2024-08-21 DIAGNOSIS — B07.0 PLANTAR WART OF RIGHT FOOT: ICD-10-CM

## 2024-08-21 PROCEDURE — 99213 OFFICE O/P EST LOW 20 MIN: CPT | Mod: S$PBB,,, | Performed by: PODIATRIST

## 2024-08-21 PROCEDURE — 99213 OFFICE O/P EST LOW 20 MIN: CPT | Mod: PBBFAC,PO | Performed by: PODIATRIST

## 2024-08-21 PROCEDURE — 99999 PR PBB SHADOW E&M-EST. PATIENT-LVL III: CPT | Mod: PBBFAC,,, | Performed by: PODIATRIST

## 2024-08-22 NOTE — PROGRESS NOTES
Subjective:      Patient ID: Hi Salmeron is a 49 y.o. male.    Chief Complaint: Foot Pain (PW, foot pain, under 5th toe pad, pain when he steps on something)  Patient presents to clinic for a follow up regarding the plantar wart of the Rt. Forefoot.  He continues to experience pain from the site with weight bearing.  Describes as sharp and rates as a 3/10.  Symptoms are alleviated with rest.  He continues applying Compound W and an occlusive dressing to the site QD.  Notes being interested in having this surgically excised to in efforts to obtain pain relief.  Denies any additional pedal complaints.        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        Past Surgical History:   Procedure Laterality Date    BICEPS TENDON REPAIR      FOOT MASS EXCISION Right 07/22/2021    Procedure: EXCISION, MASS, FOOT;  Surgeon: Mamadou Bernal DPM;  Location: [facility] OR;  Service: Podiatry;  Laterality: Right;    SEPTOPLASTY, NOSE, WITH NASAL TURBINATE REDUCTION Bilateral 3/13/2024    Procedure: SEPTOPLASTY, NOSE, WITH NASAL TURBINATE REDUCTION;  Surgeon: Geovanna Wisdom MD;  Location: [facility] OR;  Service: ENT;  Laterality: Bilateral;    UNDESCENDED TESTICLE EXPLORATION Left        Family History   Problem Relation Name Age of Onset    Breast cancer Mother Grecia Clemons 37    Cancer Mother Grecia Clemons     Hypertension Father Griffin Salmeron     Heart attack Father Griffin Salmeron     Leukemia Other          Nephew    Throat cancer Maternal Uncle      Blindness Neg Hx      Cataracts Neg Hx      Diabetes Neg Hx      Glaucoma Neg Hx      Macular degeneration Neg Hx      Retinal detachment Neg Hx      Strabismus Neg Hx      Stroke Neg Hx      Thyroid disease Neg Hx         Social History     Socioeconomic History    Marital status:    Occupational History     Employer: [employer]   Tobacco Use    Smoking status: Former     Current packs/day: 0.00     Average  packs/day: 0.3 packs/day for 10.0 years (2.5 ttl pk-yrs)     Types: Cigarettes     Start date: 1995     Quit date: 2005     Years since quittin.6    Smokeless tobacco: Never   Substance and Sexual Activity    Alcohol use: Not Currently     Comment: Socially    Drug use: No    Sexual activity: Yes     Partners: Female     Birth control/protection: None   Social History Narrative    Lives with wife and two children.      Social Determinants of Health     Financial Resource Strain: Low Risk  (1/15/2024)    Overall Financial Resource Strain (CARDIA)     Difficulty of Paying Living Expenses: Not hard at all   Food Insecurity: No Food Insecurity (1/15/2024)    Hunger Vital Sign     Worried About Running Out of Food in the Last Year: Never true     Ran Out of Food in the Last Year: Never true   Transportation Needs: No Transportation Needs (1/15/2024)    PRAPARE - Transportation     Lack of Transportation (Medical): No     Lack of Transportation (Non-Medical): No   Physical Activity: Insufficiently Active (1/15/2024)    Exercise Vital Sign     Days of Exercise per Week: 2 days     Minutes of Exercise per Session: 30 min   Stress: Stress Concern Present (1/15/2024)    [place] [employer] - Occupational Stress Questionnaire     Feeling of Stress : Very much   Housing Stability: Low Risk  (1/15/2024)    Housing Stability Vital Sign     Unable to Pay for Housing in the Last Year: No     Number of Places Lived in the Last Year: 1     Unstable Housing in the Last Year: No       Current Outpatient Medications   Medication Sig Dispense Refill    ALPRAZolam (XANAX) 0.5 MG tablet Take 1 tablet (0.5 mg total) by mouth 2 (two) times daily as needed for Anxiety. 30 tablet 0    buPROPion (WELLBUTRIN XL) 300 MG 24 hr tablet Take 1 tablet (300 mg total) by mouth once daily. 30 tablet 11    DULoxetine (CYMBALTA) 30 MG capsule TAKE 3 CAPSULES(90 MG) BY MOUTH TWICE DAILY 90 capsule 1    gabapentin  (NEURONTIN) 600 MG tablet TAKE 1 TABLET(600 MG) BY MOUTH TWICE DAILY 180 tablet 3    ibuprofen (ADVIL,MOTRIN) 800 MG tablet Take 800 mg by mouth daily as needed.      meloxicam (MOBIC) 15 MG tablet Take 1 tablet (15 mg total) by mouth once daily. 30 tablet 11    tiZANidine (ZANAFLEX) 4 MG tablet Take 1 tablet (4 mg total) by mouth every 8 (eight) hours as needed (neck pain). 90 tablet 11     No current facility-administered medications for this visit.       Review of patient's allergies indicates:  No Known Allergies    Review of Systems   Constitutional: Negative for chills and fever.   Cardiovascular:  Negative for claudication and leg swelling.   Skin:  Positive for suspicious lesions. Negative for color change and nail changes.   Musculoskeletal:  Negative for joint pain, joint swelling, muscle cramps and muscle weakness.   Gastrointestinal:  Negative for nausea and vomiting.   Neurological:  Negative for numbness and paresthesias.   Psychiatric/Behavioral:  Negative for altered mental status.            Objective:      Physical Exam  Constitutional:       Appearance: Normal appearance. He is not ill-appearing.   Cardiovascular:      Pulses:           Dorsalis pedis pulses are 2+ on the right side and 2+ on the left side.        Posterior tibial pulses are 2+ on the right side and 2+ on the left side.      Comments: CFT is < 3 seconds bilateral.  Pedal hair growth is present bilateral.  No lower extremity edema noted bilateral.  Toes are warm to touch bilateral.    Musculoskeletal:         General: Tenderness present. No deformity or signs of injury.      Right lower leg: No edema.      Left lower leg: No edema.      Comments: Muscle strength 5/5 in all muscle groups bilateral.  No tenderness nor crepitation with ROM of foot/ankle joints bilateral.  Mild pain with palpation to both a wart and porokeratosis slightly medial and inferior to the prior incision of the Rt. 4th plantar intermetatarsal space.      Skin:     General: Skin is warm and dry.      Capillary Refill: Capillary refill takes less than 2 seconds.      Findings: Lesion present. No bruising, ecchymosis, erythema, signs of injury, laceration, petechiae, rash or wound.      Comments: Pedal skin has normal turgor, temperature, and texture bilateral.  Plantar wart with intralesional capillaries noted within distal Rt. 4th intermetatarsal space.  Divergent skin lines noted to the site.  Measures 0.5 x 0.5cm.     Neurological:      Mental Status: He is alert.      Sensory: No sensory deficit.      Motor: No weakness or atrophy.      Comments: Light touch is intact bilateral.                 Assessment:       Encounter Diagnoses   Name Primary?    Foot pain, right Yes    Plantar wart of right foot            Plan:       Hi was seen today for foot pain.    Diagnoses and all orders for this visit:    Foot pain, right    Plantar wart of right foot        I counseled the patient on his conditions, their implications and medical management.    Advised to continue applying Maximum strength Compound W to the site and cover with an occlusive dressing QD.    With treatment, advised to let the site air dry if it becomes too macerated.    Patient scheduled for in office surgical excision on 9/6/24.    RTC as mentioned.    Mamadou Bernal DPM

## 2024-08-27 ENCOUNTER — PATIENT MESSAGE (OUTPATIENT)
Dept: FAMILY MEDICINE | Facility: CLINIC | Age: 49
End: 2024-08-27
Payer: OTHER GOVERNMENT

## 2024-09-06 ENCOUNTER — OFFICE VISIT (OUTPATIENT)
Dept: PODIATRY | Facility: CLINIC | Age: 49
End: 2024-09-06
Payer: OTHER GOVERNMENT

## 2024-09-06 VITALS — WEIGHT: 205.5 LBS | HEIGHT: 71 IN | BODY MASS INDEX: 28.77 KG/M2

## 2024-09-06 DIAGNOSIS — G89.18 POSTOPERATIVE PAIN: ICD-10-CM

## 2024-09-06 DIAGNOSIS — B07.0 PLANTAR WART: Primary | ICD-10-CM

## 2024-09-06 DIAGNOSIS — M79.671 FOOT PAIN, RIGHT: ICD-10-CM

## 2024-09-06 PROCEDURE — 99999 PR PBB SHADOW E&M-EST. PATIENT-LVL III: CPT | Mod: PBBFAC,,, | Performed by: PODIATRIST

## 2024-09-06 PROCEDURE — 88305 TISSUE EXAM BY PATHOLOGIST: CPT | Mod: PO | Performed by: STUDENT IN AN ORGANIZED HEALTH CARE EDUCATION/TRAINING PROGRAM

## 2024-09-06 PROCEDURE — 11422 EXC H-F-NK-SP B9+MARG 1.1-2: CPT | Mod: PBBFAC,PO | Performed by: PODIATRIST

## 2024-09-06 PROCEDURE — 99499 UNLISTED E&M SERVICE: CPT | Mod: S$PBB,,, | Performed by: PODIATRIST

## 2024-09-06 PROCEDURE — 88307 TISSUE EXAM BY PATHOLOGIST: CPT | Mod: PO | Performed by: STUDENT IN AN ORGANIZED HEALTH CARE EDUCATION/TRAINING PROGRAM

## 2024-09-06 PROCEDURE — 99213 OFFICE O/P EST LOW 20 MIN: CPT | Mod: PBBFAC,PO,25 | Performed by: PODIATRIST

## 2024-09-06 RX ORDER — HYDROCODONE BITARTRATE AND ACETAMINOPHEN 5; 325 MG/1; MG/1
1 TABLET ORAL EVERY 6 HOURS PRN
Qty: 16 TABLET | Refills: 0 | Status: SHIPPED | OUTPATIENT
Start: 2024-09-06

## 2024-09-08 NOTE — PROGRESS NOTES
Subjective:      Patient ID: Hi Salmeron is a 49 y.o. male.    Chief Complaint: Plantar Warts  Patient presents to clinic for surgical excision of the Rt. Forefoot plantar wart.  He continues to experience pain from the site with weight bearing.  Describes as sharp and rates as an 8/10.  Symptoms are alleviated with rest.  Denies noticing signs of infection to the area.  Has continued with the usual therapies to no avail. Denies any additional pedal complaints.        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        Past Surgical History:   Procedure Laterality Date    BICEPS TENDON REPAIR      FOOT MASS EXCISION Right 07/22/2021    Procedure: EXCISION, MASS, FOOT;  Surgeon: Mamadou Bernal DPM;  Location: [facility] OR;  Service: Podiatry;  Laterality: Right;    SEPTOPLASTY, NOSE, WITH NASAL TURBINATE REDUCTION Bilateral 3/13/2024    Procedure: SEPTOPLASTY, NOSE, WITH NASAL TURBINATE REDUCTION;  Surgeon: Geovanna Wisdom MD;  Location: [facility] OR;  Service: ENT;  Laterality: Bilateral;    UNDESCENDED TESTICLE EXPLORATION Left        Family History   Problem Relation Name Age of Onset    Breast cancer Mother Grecia Clemons 37    Cancer Mother Grecai Clemons     Hypertension Father Griffin Salmeron     Heart attack Father Griffin Salmeron     Leukemia Other          Nephew    Throat cancer Maternal Uncle      Blindness Neg Hx      Cataracts Neg Hx      Diabetes Neg Hx      Glaucoma Neg Hx      Macular degeneration Neg Hx      Retinal detachment Neg Hx      Strabismus Neg Hx      Stroke Neg Hx      Thyroid disease Neg Hx         Social History     Socioeconomic History    Marital status:    Occupational History     Employer: [employer]   Tobacco Use    Smoking status: Former     Current packs/day: 0.00     Average packs/day: 0.3 packs/day for 10.0 years (2.5 ttl pk-yrs)     Types: Cigarettes     Start date: 1/1/1995     Quit date: 1/1/2005     Years since  quittin.6    Smokeless tobacco: Never   Substance and Sexual Activity    Alcohol use: Not Currently     Comment: Socially    Drug use: No    Sexual activity: Yes     Partners: Female     Birth control/protection: None   Social History Narrative    Lives with wife and two children.      Social Determinants of Health     Financial Resource Strain: Low Risk  (1/15/2024)    Overall Financial Resource Strain (CARDIA)     Difficulty of Paying Living Expenses: Not hard at all   Food Insecurity: No Food Insecurity (1/15/2024)    Hunger Vital Sign     Worried About Running Out of Food in the Last Year: Never true     Ran Out of Food in the Last Year: Never true   Transportation Needs: No Transportation Needs (1/15/2024)    PRAPARE - Transportation     Lack of Transportation (Medical): No     Lack of Transportation (Non-Medical): No   Physical Activity: Insufficiently Active (1/15/2024)    Exercise Vital Sign     Days of Exercise per Week: 2 days     Minutes of Exercise per Session: 30 min   Stress: Stress Concern Present (1/15/2024)    [place] [place] [employer] - Occupational Stress Questionnaire     Feeling of Stress : Very much   Housing Stability: Low Risk  (1/15/2024)    Housing Stability Vital Sign     Unable to Pay for Housing in the Last Year: No     Number of Places Lived in the Last Year: 1     Unstable Housing in the Last Year: No       Current Outpatient Medications   Medication Sig Dispense Refill    ALPRAZolam (XANAX) 0.5 MG tablet Take 1 tablet (0.5 mg total) by mouth 2 (two) times daily as needed for Anxiety. 30 tablet 0    buPROPion (WELLBUTRIN XL) 300 MG 24 hr tablet Take 1 tablet (300 mg total) by mouth once daily. 30 tablet 11    DULoxetine (CYMBALTA) 30 MG capsule TAKE 3 CAPSULES(90 MG) BY MOUTH TWICE DAILY 90 capsule 1    gabapentin (NEURONTIN) 600 MG tablet TAKE 1 TABLET(600 MG) BY MOUTH TWICE DAILY 180 tablet 3    ibuprofen (ADVIL,MOTRIN) 800 MG tablet Take 800 mg by mouth daily as  needed.      meloxicam (MOBIC) 15 MG tablet Take 1 tablet (15 mg total) by mouth once daily. 30 tablet 11    tiZANidine (ZANAFLEX) 4 MG tablet Take 1 tablet (4 mg total) by mouth every 8 (eight) hours as needed (neck pain). 90 tablet 11    HYDROcodone-acetaminophen (NORCO) 5-325 mg per tablet Take 1 tablet by mouth every 6 (six) hours as needed for Pain. 16 tablet 0     No current facility-administered medications for this visit.       Review of patient's allergies indicates:  No Known Allergies    Review of Systems   Constitutional: Negative for chills and fever.   Cardiovascular:  Negative for claudication and leg swelling.   Skin:  Positive for suspicious lesions. Negative for color change and nail changes.   Musculoskeletal:  Negative for joint pain, joint swelling, muscle cramps and muscle weakness.   Gastrointestinal:  Negative for nausea and vomiting.   Neurological:  Negative for numbness and paresthesias.   Psychiatric/Behavioral:  Negative for altered mental status.            Objective:      Physical Exam  Constitutional:       Appearance: Normal appearance. He is not ill-appearing.   Cardiovascular:      Pulses:           Dorsalis pedis pulses are 2+ on the right side and 2+ on the left side.        Posterior tibial pulses are 2+ on the right side and 2+ on the left side.      Comments: CFT is < 3 seconds bilateral.  Pedal hair growth is present bilateral.  No lower extremity edema noted bilateral.  Toes are warm to touch bilateral.    Musculoskeletal:         General: Tenderness present. No deformity or signs of injury.      Right lower leg: No edema.      Left lower leg: No edema.      Comments: Muscle strength 5/5 in all muscle groups bilateral.  No tenderness nor crepitation with ROM of foot/ankle joints bilateral.  Mild pain with palpation to both a wart and porokeratosis slightly medial and inferior to the prior incision of the Rt. 4th plantar intermetatarsal space.     Skin:     General: Skin is  warm and dry.      Capillary Refill: Capillary refill takes less than 2 seconds.      Findings: Lesion present. No bruising, ecchymosis, erythema, signs of injury, laceration, petechiae, rash or wound.      Comments: Pedal skin has normal turgor, temperature, and texture bilateral.  Plantar wart with intralesional capillaries noted within distal Rt. 4th intermetatarsal space.  Divergent skin lines noted to the site.  Measures 0.5 x 0.5cm.     Neurological:      Mental Status: He is alert.      Sensory: No sensory deficit.      Motor: No weakness or atrophy.      Comments: Light touch is intact bilateral.                 Assessment:       Encounter Diagnoses   Name Primary?    Plantar wart Yes    Foot pain, right     Postoperative pain            Plan:       Hi was seen today for plantar warts.    Diagnoses and all orders for this visit:    Plantar wart  -     Specimen to Pathology Other    Foot pain, right    Postoperative pain  -     HYDROcodone-acetaminophen (NORCO) 5-325 mg per tablet; Take 1 tablet by mouth every 6 (six) hours as needed for Pain.        I counseled the patient on his conditions, their implications and medical management.    Discussed with patient the associated risks and benefits associated with surgical excision of the Rt. Forefoot mass.  Consent was read, signed, and witnessed.  See attached procedure note.      The incision site was covered with xeroform, and a football dressing was applied.    Advised to keep the dressing CDI x 1 week.    Advised to rest and elevate the affected extremity.    Instructed to minimize weight bearing to facilitate wound healing.    Advised to ambulate only in the postoperative shoe/boot.    RTC in 1 week.    Mamadou Bernal DPM

## 2024-09-08 NOTE — PROCEDURES
Excision of Lesion    Date/Time: 9/6/2024 2:00 PM    Performed by: Mamadou Bernal DPM  Authorized by: Mamadou Bernal DPM    Consent Done?:  Yes (Written)  Timeout: prior to procedure the correct patient, procedure, and site was verified    Prep: patient was prepped and draped in usual sterile fashion    Local anesthesia used?: Yes    Anesthesia:  Local infiltration  Local anesthetic:  Lidocaine 1% without epinephrine  Anesthetic total (ml):  4  Assistants?: No     I was present for the entire procedure.  : Painful plantar wart.  Body area:  Foot  Laterality:  Right  Position:  Supine  Anesthesia:  Local infiltration  Local anesthetic:  Lidocaine 1% without epinephrine  Excision type:  Skin  Malignancy:  Benign  Excision size (cm):  2  Scalpel size:  15  Incision type:  Elliptical  Specimens?: Yes     Specimens submitted to pathology.   Hemostasis was obtained.  Estimated blood loss (cc):  2  Wound closure:  Simple  Wound repair size (cm):  2  Sutures:  Other (comments) (3-0 nylon)  Sterile dressings:  Other (comments) (xeroform)  Post-op diagnosis:  Same as pre-op diagnosis.   Patient tolerated the procedure well with no immediate complications.   Post-operative instructions were provided for the patient.   Patient was discharged and will follow up for wound check and pathology results. and Patient was discharged and will follow up for wound check.

## 2024-09-11 LAB
FINAL PATHOLOGIC DIAGNOSIS: NORMAL
GROSS: NORMAL
Lab: NORMAL
MICROSCOPIC EXAM: NORMAL

## 2024-09-12 ENCOUNTER — PATIENT MESSAGE (OUTPATIENT)
Dept: ADMINISTRATIVE | Facility: HOSPITAL | Age: 49
End: 2024-09-12
Payer: OTHER GOVERNMENT

## 2024-09-13 ENCOUNTER — OFFICE VISIT (OUTPATIENT)
Dept: PODIATRY | Facility: CLINIC | Age: 49
End: 2024-09-13
Payer: OTHER GOVERNMENT

## 2024-09-13 VITALS — HEIGHT: 71 IN | WEIGHT: 205.5 LBS | BODY MASS INDEX: 28.77 KG/M2

## 2024-09-13 DIAGNOSIS — Z98.890 POSTOPERATIVE STATE: Primary | ICD-10-CM

## 2024-09-13 DIAGNOSIS — Z12.11 SCREENING FOR COLON CANCER: ICD-10-CM

## 2024-09-13 PROCEDURE — 99024 POSTOP FOLLOW-UP VISIT: CPT | Mod: ,,, | Performed by: PODIATRIST

## 2024-09-13 PROCEDURE — 99213 OFFICE O/P EST LOW 20 MIN: CPT | Mod: PBBFAC,PO | Performed by: PODIATRIST

## 2024-09-13 PROCEDURE — 99999 PR PBB SHADOW E&M-EST. PATIENT-LVL III: CPT | Mod: PBBFAC,,, | Performed by: PODIATRIST

## 2024-09-20 ENCOUNTER — OFFICE VISIT (OUTPATIENT)
Dept: PODIATRY | Facility: CLINIC | Age: 49
End: 2024-09-20
Payer: OTHER GOVERNMENT

## 2024-09-20 VITALS — WEIGHT: 205.5 LBS | BODY MASS INDEX: 28.77 KG/M2 | HEIGHT: 71 IN

## 2024-09-20 DIAGNOSIS — Z98.890 POSTOPERATIVE STATE: Primary | ICD-10-CM

## 2024-09-20 PROCEDURE — 99213 OFFICE O/P EST LOW 20 MIN: CPT | Mod: PBBFAC,PO | Performed by: PODIATRIST

## 2024-09-20 PROCEDURE — 99024 POSTOP FOLLOW-UP VISIT: CPT | Mod: ,,, | Performed by: PODIATRIST

## 2024-09-20 PROCEDURE — 99999 PR PBB SHADOW E&M-EST. PATIENT-LVL III: CPT | Mod: PBBFAC,,, | Performed by: PODIATRIST

## 2024-09-21 NOTE — PROGRESS NOTES
Subjective:      Patient ID: Hi Salmeron is a 49 y.o. male.    Chief Complaint: Post-op Evaluation  Patient presents to clinic 2 weeks S/P excision of a Rt. Plantar wart.  Currently denies pain from the surgical site.  Notes mild sensitivity with weight bearing but is manageable.  Has kept the football dressing CDI for the past week.  Denies experiencing N/V/F/C/D.  Denies any additional pedal complaints.        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        Past Surgical History:   Procedure Laterality Date    BICEPS TENDON REPAIR      FOOT MASS EXCISION Right 2021    Procedure: EXCISION, MASS, FOOT;  Surgeon: Mamadou Bernal DPM;  Location: [facility] OR;  Service: Podiatry;  Laterality: Right;    SEPTOPLASTY, NOSE, WITH NASAL TURBINATE REDUCTION Bilateral 3/13/2024    Procedure: SEPTOPLASTY, NOSE, WITH NASAL TURBINATE REDUCTION;  Surgeon: Geovanna Wisdom MD;  Location: [facility] OR;  Service: ENT;  Laterality: Bilateral;    UNDESCENDED TESTICLE EXPLORATION Left        Family History   Problem Relation Name Age of Onset    Breast cancer Mother Grecia Clemnos 37    Cancer Mother Grecia Clemons     Hypertension Father Griffin Salmeron     Heart attack Father Griffin Salmeron     Leukemia Other          Nephew    Throat cancer Maternal Uncle      Blindness Neg Hx      Cataracts Neg Hx      Diabetes Neg Hx      Glaucoma Neg Hx      Macular degeneration Neg Hx      Retinal detachment Neg Hx      Strabismus Neg Hx      Stroke Neg Hx      Thyroid disease Neg Hx         Social History     Socioeconomic History    Marital status:    Occupational History     Employer: [employer]   Tobacco Use    Smoking status: Former     Current packs/day: 0.00     Average packs/day: 0.3 packs/day for 10.0 years (2.5 ttl pk-yrs)     Types: Cigarettes     Start date: 1995     Quit date: 2005     Years since quittin.7    Smokeless tobacco: Never   Substance and  Sexual Activity    Alcohol use: Not Currently     Comment: Socially    Drug use: No    Sexual activity: Yes     Partners: Female     Birth control/protection: None   Social History Narrative    Lives with wife and two children.      Social Determinants of Health     Financial Resource Strain: Low Risk  (1/15/2024)    Overall Financial Resource Strain (CARDIA)     Difficulty of Paying Living Expenses: Not hard at all   Food Insecurity: No Food Insecurity (1/15/2024)    Hunger Vital Sign     Worried About Running Out of Food in the Last Year: Never true     Ran Out of Food in the Last Year: Never true   Transportation Needs: No Transportation Needs (1/15/2024)    PRAPARE - Transportation     Lack of Transportation (Medical): No     Lack of Transportation (Non-Medical): No   Physical Activity: Insufficiently Active (1/15/2024)    Exercise Vital Sign     Days of Exercise per Week: 2 days     Minutes of Exercise per Session: 30 min   Stress: Stress Concern Present (1/15/2024)    [place] [employer] - Occupational Stress Questionnaire     Feeling of Stress : Very much   Housing Stability: Low Risk  (1/15/2024)    Housing Stability Vital Sign     Unable to Pay for Housing in the Last Year: No     Number of Places Lived in the Last Year: 1     Unstable Housing in the Last Year: No       Current Outpatient Medications   Medication Sig Dispense Refill    ALPRAZolam (XANAX) 0.5 MG tablet Take 1 tablet (0.5 mg total) by mouth 2 (two) times daily as needed for Anxiety. 30 tablet 0    buPROPion (WELLBUTRIN XL) 300 MG 24 hr tablet Take 1 tablet (300 mg total) by mouth once daily. 30 tablet 11    DULoxetine (CYMBALTA) 30 MG capsule TAKE 3 CAPSULES(90 MG) BY MOUTH TWICE DAILY 90 capsule 1    gabapentin (NEURONTIN) 600 MG tablet TAKE 1 TABLET(600 MG) BY MOUTH TWICE DAILY 180 tablet 3    HYDROcodone-acetaminophen (NORCO) 5-325 mg per tablet Take 1 tablet by mouth every 6 (six) hours as needed for Pain. 16 tablet 0     ibuprofen (ADVIL,MOTRIN) 800 MG tablet Take 800 mg by mouth daily as needed.      meloxicam (MOBIC) 15 MG tablet Take 1 tablet (15 mg total) by mouth once daily. 30 tablet 11    tiZANidine (ZANAFLEX) 4 MG tablet Take 1 tablet (4 mg total) by mouth every 8 (eight) hours as needed (neck pain). 90 tablet 11     No current facility-administered medications for this visit.       Review of patient's allergies indicates:  No Known Allergies    Review of Systems   Constitutional: Negative for chills and fever.   Cardiovascular:  Negative for claudication and leg swelling.   Skin:  Negative for color change, nail changes and suspicious lesions.   Musculoskeletal:  Negative for joint pain, joint swelling, muscle cramps and muscle weakness.   Gastrointestinal:  Negative for nausea and vomiting.   Neurological:  Negative for numbness and paresthesias.   Psychiatric/Behavioral:  Negative for altered mental status.            Objective:      Physical Exam  Constitutional:       Appearance: Normal appearance. He is not ill-appearing.   Cardiovascular:      Pulses:           Dorsalis pedis pulses are 2+ on the right side and 2+ on the left side.        Posterior tibial pulses are 2+ on the right side and 2+ on the left side.      Comments: CFT is < 3 seconds bilateral.  Pedal hair growth is present bilateral.  No lower extremity edema noted bilateral.  Toes are warm to touch bilateral.    Musculoskeletal:         General: Tenderness present. No deformity or signs of injury.      Right lower leg: No edema.      Left lower leg: No edema.      Comments: Muscle strength 5/5 in all muscle groups bilateral.  No tenderness nor crepitation with ROM of foot/ankle joints bilateral.  Pain with palpation to the incision of the Rt. 4th plantar intermetatarsal space.     Skin:     General: Skin is warm and dry.      Capillary Refill: Capillary refill takes less than 2 seconds.      Findings: No bruising, ecchymosis, erythema, signs of injury,  laceration, lesion, petechiae, rash or wound.      Comments: Incision to the Rt. 4th intermetatarsal space is well approximated with all suture intact. No evidence of dehiscence, necrosis, ischemia, or infection the length of the incision.     Neurological:      Mental Status: He is alert.      Sensory: No sensory deficit.      Motor: No weakness or atrophy.      Comments: Light touch is intact bilateral.                 Assessment:       Encounter Diagnosis   Name Primary?    Postoperative state Yes             Plan:       Hi was seen today for post-op evaluation.    Diagnoses and all orders for this visit:    Postoperative state          I counseled the patient on his conditions, their implications and medical management.    Overall, satisfactory postoperative results noted.  Incision remains well approximated with all suture intact and no evidence of postoperative infection.    With the patient's verbal consent, sutures were removed without incident.  Patient tolerated this quite well.      Advised to protect the site with a mepilex border QD x 1 final week.    May resume his normal showering routine but discouraged from soaking/scrubbing the limb x 2 weeks.    May transition back into casual shoe gear with activity to tolerance.    RTC prn.    Mamadou Bernal DPM

## 2024-11-15 ENCOUNTER — PATIENT MESSAGE (OUTPATIENT)
Dept: ADMINISTRATIVE | Facility: HOSPITAL | Age: 49
End: 2024-11-15
Payer: OTHER GOVERNMENT

## 2024-11-15 ENCOUNTER — TELEPHONE (OUTPATIENT)
Dept: FAMILY MEDICINE | Facility: CLINIC | Age: 49
End: 2024-11-15
Payer: OTHER GOVERNMENT

## 2024-11-15 ENCOUNTER — PATIENT MESSAGE (OUTPATIENT)
Dept: FAMILY MEDICINE | Facility: CLINIC | Age: 49
End: 2024-11-15

## 2024-11-15 ENCOUNTER — OFFICE VISIT (OUTPATIENT)
Dept: FAMILY MEDICINE | Facility: CLINIC | Age: 49
End: 2024-11-15
Payer: OTHER GOVERNMENT

## 2024-11-15 VITALS
WEIGHT: 209.44 LBS | BODY MASS INDEX: 29.32 KG/M2 | HEART RATE: 80 BPM | OXYGEN SATURATION: 97 % | DIASTOLIC BLOOD PRESSURE: 82 MMHG | SYSTOLIC BLOOD PRESSURE: 126 MMHG | HEIGHT: 71 IN

## 2024-11-15 DIAGNOSIS — F43.0 ANXIETY AS ACUTE REACTION TO EXCEPTIONAL STRESS: ICD-10-CM

## 2024-11-15 DIAGNOSIS — J02.9 PHARYNGITIS, UNSPECIFIED ETIOLOGY: ICD-10-CM

## 2024-11-15 DIAGNOSIS — M65.331 TRIGGER MIDDLE FINGER OF RIGHT HAND: ICD-10-CM

## 2024-11-15 DIAGNOSIS — F32.9 REACTIVE DEPRESSION: ICD-10-CM

## 2024-11-15 DIAGNOSIS — F41.1 ANXIETY AS ACUTE REACTION TO EXCEPTIONAL STRESS: ICD-10-CM

## 2024-11-15 DIAGNOSIS — M79.641 RIGHT HAND PAIN: Primary | ICD-10-CM

## 2024-11-15 PROCEDURE — 87070 CULTURE OTHR SPECIMN AEROBIC: CPT | Performed by: NURSE PRACTITIONER

## 2024-11-15 PROCEDURE — 99999 PR PBB SHADOW E&M-EST. PATIENT-LVL III: CPT | Mod: PBBFAC,,, | Performed by: NURSE PRACTITIONER

## 2024-11-15 PROCEDURE — 99213 OFFICE O/P EST LOW 20 MIN: CPT | Mod: PBBFAC,PO | Performed by: NURSE PRACTITIONER

## 2024-11-15 NOTE — PROGRESS NOTES
Subjective:       Patient ID: Hi Salmeron is a 49 y.o. male.    Chief Complaint: Hand Pain    HPI pain and clicking to 3rd and 4th fingers of right hand for several weeks. Pain is keeping patient awake at night and gets better during the day. Has been taking tizandine, which helps him to fall asleep but still waking up during the night in pain. No hand swelling. No numbness/tingling. No wrist pain. Had trigger finger injection of right hand with orthopedics in 3/2023 with improvement.    Has been tired for the past 2 wks. Went to urgent care last week for URI symptoms and was prescribed amoxicillin for tonsillitis. Entire family had same symptoms and no one was tested for flu/covid. Reports strep swab was negative. Started with sore throat and joint pain 2 days ago.    Stopped cymbalta and wellbutrin 2 months ago because it was not effective any longer. Pt also worried about side effects. Pt reports he feels anxious and depressed. Has a lot of family issues currently. Pt reports he is irritable and does not enjoy anything. Denies SI/HI  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        Past Surgical History:   Procedure Laterality Date    BICEPS TENDON REPAIR      FOOT MASS EXCISION Right 07/22/2021    Procedure: EXCISION, MASS, FOOT;  Surgeon: Mamadou Bernal DPM;  Location: [facility] OR;  Service: Podiatry;  Laterality: Right;    SEPTOPLASTY, NOSE, WITH NASAL TURBINATE REDUCTION Bilateral 3/13/2024    Procedure: SEPTOPLASTY, NOSE, WITH NASAL TURBINATE REDUCTION;  Surgeon: Geovanna Wisdom MD;  Location: [facility] OR;  Service: ENT;  Laterality: Bilateral;    UNDESCENDED TESTICLE EXPLORATION Left        Review of patient's allergies indicates:  No Known Allergies    Social History     Socioeconomic History    Marital status:    Occupational History     Employer: [employer] ([employer])   Tobacco Use    Smoking status: Former     Current packs/day: 0.00      Average packs/day: 0.3 packs/day for 10.0 years (2.5 ttl pk-yrs)     Types: Cigarettes     Start date: 1995     Quit date: 2005     Years since quittin.8    Smokeless tobacco: Never   Substance and Sexual Activity    Alcohol use: Not Currently     Comment: Socially    Drug use: No    Sexual activity: Yes     Partners: Female     Birth control/protection: None   Social History Narrative    Lives with wife and two children.      Social Drivers of Health     Financial Resource Strain: Low Risk  (1/15/2024)    Overall Financial Resource Strain (CARDIA)     Difficulty of Paying Living Expenses: Not hard at all   Food Insecurity: No Food Insecurity (1/15/2024)    Hunger Vital Sign     Worried About Running Out of Food in the Last Year: Never true     Ran Out of Food in the Last Year: Never true   Transportation Needs: No Transportation Needs (1/15/2024)    PRAPARE - Transportation     Lack of Transportation (Medical): No     Lack of Transportation (Non-Medical): No   Physical Activity: Insufficiently Active (1/15/2024)    Exercise Vital Sign     Days of Exercise per Week: 2 days     Minutes of Exercise per Session: 30 min   Stress: Stress Concern Present (1/15/2024)    [employer] - Occupational Stress Questionnaire     Feeling of Stress : Very much   Housing Stability: Low Risk  (1/15/2024)    Housing Stability Vital Sign     Unable to Pay for Housing in the Last Year: No     Number of Places Lived in the Last Year: 1     Unstable Housing in the Last Year: No       Current Outpatient Medications on File Prior to Visit   Medication Sig Dispense Refill    ALPRAZolam (XANAX) 0.5 MG tablet Take 1 tablet (0.5 mg total) by mouth 2 (two) times daily as needed for Anxiety. 30 tablet 0    DULoxetine (CYMBALTA) 30 MG capsule TAKE 3 CAPSULES(90 MG) BY MOUTH TWICE DAILY 90 capsule 1    gabapentin (NEURONTIN) 600 MG tablet TAKE 1 TABLET(600 MG) BY MOUTH TWICE DAILY 180 tablet 3     HYDROcodone-acetaminophen (NORCO) 5-325 mg per tablet Take 1 tablet by mouth every 6 (six) hours as needed for Pain. 16 tablet 0    meloxicam (MOBIC) 15 MG tablet Take 1 tablet (15 mg total) by mouth once daily. 30 tablet 11    tiZANidine (ZANAFLEX) 4 MG tablet Take 1 tablet (4 mg total) by mouth every 8 (eight) hours as needed (neck pain). 90 tablet 11    [DISCONTINUED] buPROPion (WELLBUTRIN XL) 300 MG 24 hr tablet Take 1 tablet (300 mg total) by mouth once daily. 30 tablet 11    [DISCONTINUED] ibuprofen (ADVIL,MOTRIN) 800 MG tablet Take 800 mg by mouth daily as needed.       No current facility-administered medications on file prior to visit.       Family History   Problem Relation Name Age of Onset    Breast cancer Mother Grecia Clemons 37    Cancer Mother Grecia Clemons     Hypertension Father Griffin Salmeron     Heart attack Father Griffin Salmeron     Leukemia Other          Nephew    Throat cancer Maternal Uncle      Blindness Neg Hx      Cataracts Neg Hx      Diabetes Neg Hx      Glaucoma Neg Hx      Macular degeneration Neg Hx      Retinal detachment Neg Hx      Strabismus Neg Hx      Stroke Neg Hx      Thyroid disease Neg Hx         Review of Systems   Constitutional:  Negative for activity change and unexpected weight change.   HENT:  Negative for hearing loss, rhinorrhea and trouble swallowing.    Eyes:  Negative for discharge and visual disturbance.   Respiratory:  Negative for chest tightness and wheezing.    Cardiovascular:  Negative for chest pain and palpitations.   Gastrointestinal:  Negative for blood in stool, constipation, diarrhea and vomiting.   Endocrine: Negative for polydipsia and polyuria.   Genitourinary:  Negative for difficulty urinating, hematuria and urgency.   Musculoskeletal:  Positive for arthralgias and neck pain. Negative for joint swelling.   Neurological:  Positive for headaches. Negative for weakness.   Psychiatric/Behavioral:  Negative for confusion and dysphoric mood.        Objective:  "     /82   Pulse 80   Ht 5' 11" (1.803 m)   Wt 95 kg (209 lb 7 oz)   SpO2 97%   BMI 29.21 kg/m²   Physical Exam  Vitals and nursing note reviewed.   Constitutional:       General: He is not in acute distress.     Appearance: Normal appearance. He is well-groomed.   HENT:      Head: Normocephalic.      Right Ear: Tympanic membrane, ear canal and external ear normal.      Left Ear: Tympanic membrane, ear canal and external ear normal.      Nose: Nose normal.      Mouth/Throat:      Lips: Pink.      Mouth: Mucous membranes are moist.      Pharynx: Oropharynx is clear. No oropharyngeal exudate or posterior oropharyngeal erythema.   Eyes:      Extraocular Movements: Extraocular movements intact.      Conjunctiva/sclera: Conjunctivae normal.      Pupils: Pupils are equal, round, and reactive to light.   Cardiovascular:      Rate and Rhythm: Normal rate and regular rhythm.      Heart sounds: Normal heart sounds. No murmur heard.  Pulmonary:      Effort: Pulmonary effort is normal.      Breath sounds: Normal breath sounds.   Chest:      Chest wall: No tenderness.   Abdominal:      General: Bowel sounds are normal.      Palpations: Abdomen is soft.      Tenderness: There is no abdominal tenderness.   Musculoskeletal:         General: Tenderness (over PIP and DIP joints of 3rd and 4th fingers of right hand) present. No swelling. Normal range of motion.      Cervical back: Normal range of motion and neck supple.      Right lower leg: No edema.      Left lower leg: No edema.      Comments: Negative Phalen and Tinel   Lymphadenopathy:      Cervical: No cervical adenopathy.   Skin:     General: Skin is warm and dry.   Neurological:      General: No focal deficit present.      Mental Status: He is alert and oriented to person, place, and time. Mental status is at baseline.      Gait: Gait is intact.   Psychiatric:         Mood and Affect: Mood normal.         Behavior: Behavior normal.         Assessment:       1. Right " hand pain    2. Pharyngitis, unspecified etiology    3. Trigger middle finger of right hand    4. Anxiety as acute reaction to exceptional stress    5. Reactive depression        Plan:       Right hand pain    Pharyngitis, unspecified etiology  -     CULTURE, RESPIRATORY  - THROAT    Trigger middle finger of right hand  -     Ambulatory referral/consult to Orthopedics; Future; Expected date: 11/22/2024    Anxiety as acute reaction to exceptional stress    Reactive depression        Hand pain: continue meloxicam or ibuprofen. Refer to orthopedics    Pharyngitis: most likely viral. Fatigue can last 2-3 weeks after virus. Throat culture obtained. Will consider labs if no improvement over next 7 days    Anxiety/depression: restart cymbalta 30 mg (1 tablet) daily for 7 days then take 60 mg (2 tablets) daily. RTC 1 month.

## 2024-11-15 NOTE — TELEPHONE ENCOUNTER
Tried to call pt to get servando. Unsuccessful in reaching. I did lvm to inform PCP is out and I got him servando to see someone else in clinic.

## 2024-11-18 ENCOUNTER — PATIENT MESSAGE (OUTPATIENT)
Dept: ADMINISTRATIVE | Facility: HOSPITAL | Age: 49
End: 2024-11-18
Payer: OTHER GOVERNMENT

## 2024-11-19 ENCOUNTER — PATIENT OUTREACH (OUTPATIENT)
Dept: ADMINISTRATIVE | Facility: HOSPITAL | Age: 49
End: 2024-11-19
Payer: OTHER GOVERNMENT

## 2024-11-19 ENCOUNTER — OFFICE VISIT (OUTPATIENT)
Dept: ORTHOPEDICS | Facility: CLINIC | Age: 49
End: 2024-11-19
Payer: OTHER GOVERNMENT

## 2024-11-19 VITALS — BODY MASS INDEX: 29.32 KG/M2 | WEIGHT: 209.44 LBS | HEIGHT: 71 IN

## 2024-11-19 DIAGNOSIS — M65.331 TRIGGER MIDDLE FINGER OF RIGHT HAND: ICD-10-CM

## 2024-11-19 DIAGNOSIS — M65.341 TRIGGER RING FINGER OF RIGHT HAND: Primary | ICD-10-CM

## 2024-11-19 PROCEDURE — 99213 OFFICE O/P EST LOW 20 MIN: CPT | Mod: PBBFAC,PO | Performed by: PHYSICIAN ASSISTANT

## 2024-11-19 PROCEDURE — 99999 PR PBB SHADOW E&M-EST. PATIENT-LVL III: CPT | Mod: PBBFAC,,, | Performed by: PHYSICIAN ASSISTANT

## 2024-11-19 PROCEDURE — 99213 OFFICE O/P EST LOW 20 MIN: CPT | Mod: S$PBB,,, | Performed by: PHYSICIAN ASSISTANT

## 2024-11-19 NOTE — PROGRESS NOTES
2024    HPI:  Hi Salmeron is a 49 y.o. male, who presents to clinic today for continued evaluation of his right middle and right ring finger trigger fingers.  States the injection she received a proximally 1.5 years ago completely resolved his symptoms until recently.  States he is here today to discuss possible treatment options.  Denies any other complaints at this time.    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        PSHX:  Past Surgical History:   Procedure Laterality Date    BICEPS TENDON REPAIR      FOOT MASS EXCISION Right 2021    Procedure: EXCISION, MASS, FOOT;  Surgeon: Mamadou Bernal DPM;  Location: [facility] OR;  Service: Podiatry;  Laterality: Right;    SEPTOPLASTY, NOSE, WITH NASAL TURBINATE REDUCTION Bilateral 3/13/2024    Procedure: SEPTOPLASTY, NOSE, WITH NASAL TURBINATE REDUCTION;  Surgeon: Geovanna Wisdom MD;  Location: [facility] OR;  Service: ENT;  Laterality: Bilateral;    UNDESCENDED TESTICLE EXPLORATION Left        FMHX:  Family History   Problem Relation Name Age of Onset    Breast cancer Mother Grecia Clemons 37    Cancer Mother Grecia Clemons     Hypertension Father Griffin Salmeron     Heart attack Father Griffin Salmeron     Leukemia Other          Nephew    Throat cancer Maternal Uncle      Blindness Neg Hx      Cataracts Neg Hx      Diabetes Neg Hx      Glaucoma Neg Hx      Macular degeneration Neg Hx      Retinal detachment Neg Hx      Strabismus Neg Hx      Stroke Neg Hx      Thyroid disease Neg Hx         SOCHX:  Social History     Tobacco Use    Smoking status: Former     Current packs/day: 0.00     Average packs/day: 0.3 packs/day for 10.0 years (2.5 ttl pk-yrs)     Types: Cigarettes     Start date: 1995     Quit date: 2005     Years since quittin.8    Smokeless tobacco: Never   Substance Use Topics    Alcohol use: Not Currently     Comment: Socially       ALLERGIES:  Patient has no known allergies.    CURRENT  "MEDICATIONS:  Current Outpatient Medications on File Prior to Visit   Medication Sig Dispense Refill    ALPRAZolam (XANAX) 0.5 MG tablet Take 1 tablet (0.5 mg total) by mouth 2 (two) times daily as needed for Anxiety. 30 tablet 0    DULoxetine (CYMBALTA) 30 MG capsule TAKE 3 CAPSULES(90 MG) BY MOUTH TWICE DAILY 90 capsule 1    gabapentin (NEURONTIN) 600 MG tablet TAKE 1 TABLET(600 MG) BY MOUTH TWICE DAILY 180 tablet 3    HYDROcodone-acetaminophen (NORCO) 5-325 mg per tablet Take 1 tablet by mouth every 6 (six) hours as needed for Pain. 16 tablet 0    meloxicam (MOBIC) 15 MG tablet Take 1 tablet (15 mg total) by mouth once daily. 30 tablet 11    tiZANidine (ZANAFLEX) 4 MG tablet Take 1 tablet (4 mg total) by mouth every 8 (eight) hours as needed (neck pain). 90 tablet 11     No current facility-administered medications on file prior to visit.       REVIEW OF SYSTEMS:  Review of Systems Complete; Negative, unless noted above.    GENERAL PHYSICAL EXAM:   Ht 5' 11" (1.803 m)   Wt 95 kg (209 lb 7 oz)   BMI 29.21 kg/m²    GEN: well developed, well nourished, no acute distress   PULM: No wheezing, no respiratory distress   CV: RRR    ORTHO EXAM:   Examination of the right hand/wrist reveals no edema, erythema, ecchymosis, or skin rashes.  Able make composite fist and fully extend all fingers.  No active triggering noted of the fingers.  Presence of mild catching over the areas of the A1 pulleys of the right middle and right ring fingers.  5/5 /intrinsic strength.  Normal sensation in the radial, ulnar, and median nerve distributions.  Capillary refill is 2 seconds.    RADIOLOGY:   None.    ASSESSMENT:   Right middle finger trigger finger, right ring finger trigger finger    PLAN:  1. I discussed with Hi Salmeron that considering he had significant long-lasting relief with the previous injections, that the best course of action this time is to perform repeat steroid injections into the flexor tendon sheath at the level " of the A1 pulleys of the right middle and right ring finger trigger fingers.  He verbally agreed with the treatment plan     2.  Informed consent was obtained.  After an alcohol prep followed by a chlorhexidine prep, a steroid injection was placed into the right middle finger and right ring finger trigger fingers.  He tolerated the procedures well with no immediate complications     3.  I would like him follow-up in clinic on a p.r.n. basis for any worsening of his symptoms or for any hand, wrist, elbow problems or concerns.  She was instructed to contact the clinic for any problems or concerns in the interim.

## 2024-11-19 NOTE — PROGRESS NOTES
Population Health Chart Review & Patient Outreach Details      Additional [facility] Health Notes:               Updates Requested / Reviewed:      Updated Care Coordination Note         Health Maintenance Topics Overdue:      VB Score: 2     Colon Cancer Screening  Hemoglobin A1c                       Health Maintenance Topic(s) Outreach Outcomes & Actions Taken:    Colorectal Cancer Screening - Outreach Outcomes & Actions Taken  : Reminded Patient to Complete Already Received Home Test

## 2024-12-13 ENCOUNTER — TELEPHONE (OUTPATIENT)
Dept: FAMILY MEDICINE | Facility: CLINIC | Age: 49
End: 2024-12-13
Payer: OTHER GOVERNMENT

## 2024-12-17 ENCOUNTER — OFFICE VISIT (OUTPATIENT)
Dept: FAMILY MEDICINE | Facility: CLINIC | Age: 49
End: 2024-12-17
Payer: OTHER GOVERNMENT

## 2024-12-17 VITALS
HEIGHT: 71 IN | HEART RATE: 77 BPM | OXYGEN SATURATION: 96 % | SYSTOLIC BLOOD PRESSURE: 116 MMHG | DIASTOLIC BLOOD PRESSURE: 80 MMHG | BODY MASS INDEX: 28.86 KG/M2 | WEIGHT: 206.13 LBS

## 2024-12-17 DIAGNOSIS — R42 EPISODIC LIGHTHEADEDNESS: Primary | ICD-10-CM

## 2024-12-17 DIAGNOSIS — R06.09 DYSPNEA ON EXERTION: ICD-10-CM

## 2024-12-17 LAB
OHS QRS DURATION: 90 MS
OHS QTC CALCULATION: 425 MS

## 2024-12-17 PROCEDURE — 99999 PR PBB SHADOW E&M-EST. PATIENT-LVL III: CPT | Mod: PBBFAC,,, | Performed by: FAMILY MEDICINE

## 2024-12-17 PROCEDURE — 99213 OFFICE O/P EST LOW 20 MIN: CPT | Mod: PBBFAC,PO | Performed by: FAMILY MEDICINE

## 2024-12-17 PROCEDURE — 93010 ELECTROCARDIOGRAM REPORT: CPT | Mod: S$PBB,,, | Performed by: INTERNAL MEDICINE

## 2024-12-17 PROCEDURE — 99214 OFFICE O/P EST MOD 30 MIN: CPT | Mod: S$PBB,,, | Performed by: FAMILY MEDICINE

## 2024-12-17 PROCEDURE — 93005 ELECTROCARDIOGRAM TRACING: CPT | Mod: PBBFAC,PO | Performed by: INTERNAL MEDICINE

## 2024-12-17 RX ORDER — METHOCARBAMOL 500 MG/1
500-1000 TABLET, FILM COATED ORAL
COMMUNITY
Start: 2024-09-19

## 2024-12-17 NOTE — PROGRESS NOTES
"Patient ID: Hi Salmeron is a 49 y.o. male.    Chief Complaint: Pre Syncope (Ongoing for a few days, almost fell yesterday, Pt laid down after almost falling and "knocked out for about 20 min" just re-started cymbalta)    History of Present Illness    CHIEF COMPLAINT:  Patient presents today with symptoms of feeling lightheaded and woozy.    HISTORY OF PRESENT ILLNESS:  He reports feeling lightheaded and woozy for the past couple of days, with sensation that his legs might give out. He also experiences dyspnea with exertion.    FAMILY HISTORY:  He has immediate family history of heart disease.      ROS:  General: -fever, -chills, -fatigue, -weight gain, -weight loss  Eyes: -vision changes, -redness, -discharge  ENT: -ear pain, -nasal congestion, -sore throat  Cardiovascular: -chest pain, -palpitations, -lower extremity edema  Respiratory: -cough, +shortness of breath  Gastrointestinal: -abdominal pain, -nausea, -vomiting, -diarrhea, -constipation, -blood in stool  Genitourinary: -dysuria, -hematuria, -frequency  Musculoskeletal: -joint pain, -muscle pain  Skin: -rash, -lesion  Neurological: -headache, -dizziness, -numbness, -tingling, +lightheadedness  Psychiatric: -anxiety, -depression, -sleep difficulty         Physical Exam    General: No acute distress. Well-developed. Well-nourished.  Eyes: EOMI. Sclerae anicteric.  HENT: Normocephalic. Atraumatic. Nares patent. Moist oral mucosa.  Ears: Bilateral TMs clear. Bilateral EACs clear.  Cardiovascular: Regular rate. Regular rhythm. No murmurs. No rubs. No gallops. Normal S1, S2.  Respiratory: Normal respiratory effort. Clear to auscultation bilaterally. No rales. No rhonchi. No wheezing.  Abdomen: Soft. Non-tender. Non-distended. Normoactive bowel sounds.  Musculoskeletal: No  obvious deformity.  Extremities: No lower extremity edema.  Neurological: Alert & oriented x3. No slurred speech. Normal gait.  Psychiatric: Normal mood. Normal affect. Good insight. Good " judgment.  Skin: Warm. Dry. No rash.         Assessment & Plan    IMPRESSION:  - Considered angina equivalent given patient's symptoms of lightheadedness, wooziness, and dyspnea with exertion, despite lack of chest discomfort  - Will proceed with cardiac evaluation to rule out cardiac etiology before pursuing other potential causes    CARDIAC EVALUATION:  - Ordered baseline EKG.  - Referred the patient to cardiology for prompt evaluation.  - Assessed the need to rule out angina equivalent as a cause of symptoms.  - Physical exam revealed regular rate and rhythm, with no signs of JOEY or CHF.  - Noted the patient's family history of heart disease in the immediate family.  - Considered family history while evaluating patient's symptoms.    LIGHTHEADEDNESS AND WEAKNESS:  - Patient reports feeling lightheaded, woozy, and almost post-anesthetic for the past couple of days, with a sensation that his legs might give out at one point.  - Examined the patient and found no signs of syncope or collapse.    DYSPNEA:  - Patient is experiencing dyspnea with exertion.  - Examined the patient and found no diaphoresis or severe hypoxemia.  - Noted that home pulse oximetry has been checked a few times.  - Suggested that the patient's pulse oximeter might need replacement.    FOLLOW UP:  - Instructed the patient to follow up with PCP to continue workup if symptoms persist after cardiac evaluation.           No follow-ups on file.    This note was generated with the assistance of ambient listening technology. Verbal consent was obtained by the patient and accompanying visitor(s) for the recording of patient appointment to facilitate this note. I attest to having reviewed and edited the generated note for accuracy, though some syntax or spelling errors may persist. Please contact the author of this note for any clarification.

## 2024-12-18 ENCOUNTER — TELEPHONE (OUTPATIENT)
Dept: CARDIOLOGY | Facility: CLINIC | Age: 49
End: 2024-12-18
Payer: OTHER GOVERNMENT

## 2024-12-18 NOTE — TELEPHONE ENCOUNTER
Received referral message for pt to be seen asap with a cardiologist due to sob with exertion and lightheadedness.     Attempted to contact pt twice and both calls were forwarded to  that was not set up.    Msg sent to [facility] office to inform them of this information.

## 2025-01-14 DIAGNOSIS — G89.29 CHRONIC LOW BACK PAIN WITHOUT SCIATICA, UNSPECIFIED BACK PAIN LATERALITY: ICD-10-CM

## 2025-01-14 DIAGNOSIS — M54.50 CHRONIC LOW BACK PAIN WITHOUT SCIATICA, UNSPECIFIED BACK PAIN LATERALITY: ICD-10-CM

## 2025-01-14 RX ORDER — GABAPENTIN 600 MG/1
600 TABLET ORAL 2 TIMES DAILY
Qty: 180 TABLET | Refills: 1 | Status: SHIPPED | OUTPATIENT
Start: 2025-01-14 | End: 2025-01-28 | Stop reason: SDUPTHER

## 2025-01-14 NOTE — TELEPHONE ENCOUNTER
Care Due:                  Date            Visit Type   Department     Provider  --------------------------------------------------------------------------------                                SAME DAY -                              ESTABLISHED   [facility]  Last Visit: 12-      PATIENT      MEDICINE       Maverick Liu                              EP -                              PRIMARY      [facility]  Next Visit: 01-      CARE ([facility])   MEDICINE       Shukri Flores                                                            Last  Test          Frequency    Reason                     Performed    Due Date  --------------------------------------------------------------------------------    CBC.........  12 months..  meloxicam................  Not Found    Overdue    CMP.........  12 months..  meloxicam................  02- 01-    Health [facility] Embedded Care Due Messages. Reference number: [id number].   1/14/2025 3:39:30 AM CST

## 2025-01-28 ENCOUNTER — OFFICE VISIT (OUTPATIENT)
Dept: FAMILY MEDICINE | Facility: CLINIC | Age: 50
End: 2025-01-28
Payer: OTHER GOVERNMENT

## 2025-01-28 VITALS
SYSTOLIC BLOOD PRESSURE: 130 MMHG | HEIGHT: 71 IN | HEART RATE: 75 BPM | WEIGHT: 213.38 LBS | DIASTOLIC BLOOD PRESSURE: 86 MMHG | BODY MASS INDEX: 29.87 KG/M2 | OXYGEN SATURATION: 97 %

## 2025-01-28 DIAGNOSIS — F41.1 ANXIETY AS ACUTE REACTION TO EXCEPTIONAL STRESS: ICD-10-CM

## 2025-01-28 DIAGNOSIS — F32.9 REACTIVE DEPRESSION: ICD-10-CM

## 2025-01-28 DIAGNOSIS — G89.29 CHRONIC NECK PAIN: ICD-10-CM

## 2025-01-28 DIAGNOSIS — M54.50 CHRONIC LOW BACK PAIN WITHOUT SCIATICA, UNSPECIFIED BACK PAIN LATERALITY: ICD-10-CM

## 2025-01-28 DIAGNOSIS — M54.2 CHRONIC NECK PAIN: ICD-10-CM

## 2025-01-28 DIAGNOSIS — F43.0 ANXIETY AS ACUTE REACTION TO EXCEPTIONAL STRESS: ICD-10-CM

## 2025-01-28 DIAGNOSIS — Z00.00 HEALTHCARE MAINTENANCE: Primary | ICD-10-CM

## 2025-01-28 DIAGNOSIS — G89.29 CHRONIC LOW BACK PAIN WITHOUT SCIATICA, UNSPECIFIED BACK PAIN LATERALITY: ICD-10-CM

## 2025-01-28 PROCEDURE — 99214 OFFICE O/P EST MOD 30 MIN: CPT | Mod: S$PBB,,, | Performed by: STUDENT IN AN ORGANIZED HEALTH CARE EDUCATION/TRAINING PROGRAM

## 2025-01-28 PROCEDURE — 99213 OFFICE O/P EST LOW 20 MIN: CPT | Mod: PBBFAC,PO | Performed by: STUDENT IN AN ORGANIZED HEALTH CARE EDUCATION/TRAINING PROGRAM

## 2025-01-28 PROCEDURE — 99999 PR PBB SHADOW E&M-EST. PATIENT-LVL III: CPT | Mod: PBBFAC,,, | Performed by: STUDENT IN AN ORGANIZED HEALTH CARE EDUCATION/TRAINING PROGRAM

## 2025-01-28 RX ORDER — ALPRAZOLAM 0.5 MG/1
0.5 TABLET ORAL DAILY PRN
Qty: 30 TABLET | Refills: 1 | Status: SHIPPED | OUTPATIENT
Start: 2025-01-28

## 2025-01-28 RX ORDER — MELOXICAM 15 MG/1
15 TABLET ORAL DAILY
Qty: 30 TABLET | Refills: 11 | Status: SHIPPED | OUTPATIENT
Start: 2025-01-28

## 2025-01-28 RX ORDER — GABAPENTIN 600 MG/1
600 TABLET ORAL 2 TIMES DAILY
Qty: 180 TABLET | Refills: 1 | Status: SHIPPED | OUTPATIENT
Start: 2025-01-28

## 2025-01-28 NOTE — ASSESSMENT & PLAN NOTE
Patient continues to have significant anxiety and depression. He mentions lack of enjoyment. He had stopped his bupropion and duloxetine as he thought he didn't need them at one point. However, he recently restarted the duloxetine. Currently at 30mg. His neuropathy also started acting up a bit which was why he wanted to restart duloxetine. He says he is about to start a job - 10 hour days four days a week. Hopefully, some structure will help the patient. Restart Xanax. Continue duloxetine.

## 2025-01-28 NOTE — PROGRESS NOTES
Name: Hi Salmeron  MRN: [id number]  : 1975    Subjective   HPI  Patient presents for regular check up.     Review of Systems   Cardiovascular:  Negative for palpitations and leg swelling.   Neurological:  Positive for numbness. Negative for dizziness.   Psychiatric/Behavioral:  Positive for dysphoric mood. The patient is nervous/anxious.         Patient Active Problem List   Diagnosis    Chronic neck pain    Neck pain    Anxiety as acute reaction to exceptional stress    Sprain of lateral collateral ligament of right knee    Strain of right hamstring    Open wound of plantar aspect of foot    Chronic pain of right knee    Chronic low back pain    Reactive depression    Joint pain in fingers of right hand       Current Outpatient Medications on File Prior to Visit   Medication Sig Dispense Refill    DULoxetine (CYMBALTA) 30 MG capsule TAKE 3 CAPSULES(90 MG) BY MOUTH TWICE DAILY 90 capsule 1    methocarbamoL (ROBAXIN) 500 MG Tab Take 500-1,000 mg by mouth.      [DISCONTINUED] gabapentin (NEURONTIN) 600 MG tablet TAKE 1 TABLET(600 MG) BY MOUTH TWICE DAILY 180 tablet 1    [DISCONTINUED] meloxicam (MOBIC) 15 MG tablet Take 1 tablet (15 mg total) by mouth once daily. 30 tablet 11    tiZANidine (ZANAFLEX) 4 MG tablet Take 1 tablet (4 mg total) by mouth every 8 (eight) hours as needed (neck pain). (Patient not taking: Reported on 2025) 90 tablet 11     No current facility-administered medications on file prior to visit.       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        Past Surgical History:   Procedure Laterality Date    BICEPS TENDON REPAIR      FOOT MASS EXCISION Right 2021    Procedure: EXCISION, MASS, FOOT;  Surgeon: Mamadou Bernal DPM;  Location: [facility] OR;  Service: Podiatry;  Laterality: Right;    SEPTOPLASTY, NOSE, WITH NASAL TURBINATE REDUCTION Bilateral 3/13/2024    Procedure: SEPTOPLASTY, NOSE, WITH NASAL TURBINATE REDUCTION;  Surgeon:  Geovanna Wisdom MD;  Location: [facility] OR;  Service: ENT;  Laterality: Bilateral;    UNDESCENDED TESTICLE EXPLORATION Left         Family History   Problem Relation Name Age of Onset    Breast cancer Mother Grecia Clemons 37    Cancer Mother Grecia Clemons     Hypertension Father Griffin Salmeron     Heart attack Father Griffin Salmeron     Leukemia Other          Nephew    Throat cancer Maternal Uncle      Blindness Neg Hx      Cataracts Neg Hx      Diabetes Neg Hx      Glaucoma Neg Hx      Macular degeneration Neg Hx      Retinal detachment Neg Hx      Strabismus Neg Hx      Stroke Neg Hx      Thyroid disease Neg Hx         Social History     Socioeconomic History    Marital status:    Occupational History     Employer: [employer]   Tobacco Use    Smoking status: Former     Current packs/day: 0.00     Average packs/day: 0.3 packs/day for 10.0 years (2.5 ttl pk-yrs)     Types: Cigarettes     Start date: 1995     Quit date: 2005     Years since quittin.0    Smokeless tobacco: Never   Substance and Sexual Activity    Alcohol use: Not Currently     Comment: Socially    Drug use: No    Sexual activity: Yes     Partners: Female     Birth control/protection: None   Social History Narrative    Lives with wife and two children.      Social Drivers of Health     Financial Resource Strain: Low Risk  (1/15/2024)    Overall Financial Resource Strain (CARDIA)     Difficulty of Paying Living Expenses: Not hard at all   Food Insecurity: No Food Insecurity (1/15/2024)    Hunger Vital Sign     Worried About Running Out of Food in the Last Year: Never true     Ran Out of Food in the Last Year: Never true   Transportation Needs: No Transportation Needs (1/15/2024)    PRAPARE - Transportation     Lack of Transportation (Medical): No     Lack of Transportation (Non-Medical): No   Physical Activity: Insufficiently Active (1/15/2024)    Exercise Vital Sign     Days of Exercise per Week: 2 days     Minutes of  Exercise per Session: 30 min   Stress: Stress Concern Present (1/15/2024)    [employer] [place] [employer] - Occupational Stress Questionnaire     Feeling of Stress : Very much   Housing Stability: Low Risk  (1/15/2024)    Housing Stability Vital Sign     Unable to Pay for Housing in the Last Year: No     Number of Places Lived in the Last Year: 1     Unstable Housing in the Last Year: No       Review of patient's allergies indicates:   Allergen Reactions    Primaquine      Other Reaction(s): Unknown    G6PD DEF        Health Maintenance Due   Topic Date Due    Hemoglobin A1c (Diabetic Prevention Screening)  02/05/2022    Shingles Vaccine (1 of 2) Never done    Pneumococcal Vaccines (Age 50+) (1 of 1 - PCV) Never done    Colorectal Cancer Screening  08/08/2024    COVID-19 Vaccine (2 - 2024-25 season) 09/01/2024       Objective   Vitals:    01/28/25 1032   BP: 130/86   Pulse: 75        Physical Exam  Constitutional:       General: He is not in acute distress.     Appearance: Normal appearance. He is well-developed.   HENT:      Head: Normocephalic and atraumatic.      Right Ear: External ear normal.      Left Ear: External ear normal.   Eyes:      Conjunctiva/sclera: Conjunctivae normal.   Pulmonary:      Effort: Pulmonary effort is normal. No respiratory distress.   Abdominal:      General: Abdomen is flat. There is no distension.   Musculoskeletal:         General: No swelling or deformity.      Right lower leg: No edema.      Left lower leg: No edema.   Skin:     General: Skin is warm and dry.      Coloration: Skin is not jaundiced.   Neurological:      Mental Status: He is alert and oriented to person, place, and time. Mental status is at baseline.   Psychiatric:         Attention and Perception: Attention and perception normal.         Mood and Affect: Mood normal.         Speech: Speech normal.         Behavior: Behavior normal. Behavior is cooperative.         Thought Content: Thought content  normal.         Cognition and Memory: Cognition normal.         Judgment: Judgment normal.          Assessment & Plan   1. Healthcare maintenance  -     Comprehensive Metabolic Panel; Future; Expected date: 01/28/2025  -     Lipid Panel; Future; Expected date: 01/28/2025  -     Hemoglobin A1C; Future; Expected date: 01/28/2025    2. Chronic low back pain without sciatica, unspecified back pain laterality  -     gabapentin (NEURONTIN) 600 MG tablet; Take 1 tablet (600 mg total) by mouth 2 (two) times daily.  Dispense: 180 tablet; Refill: 1    3. Chronic neck pain  -     meloxicam (MOBIC) 15 MG tablet; Take 1 tablet (15 mg total) by mouth once daily.  Dispense: 30 tablet; Refill: 11    4. Anxiety as acute reaction to exceptional stress  Assessment & Plan:  Patient continues to have significant anxiety and depression. He mentions lack of enjoyment. He had stopped his bupropion and duloxetine as he thought he didn't need them at one point. However, he recently restarted the duloxetine. Currently at 30mg. His neuropathy also started acting up a bit which was why he wanted to restart duloxetine. He says he is about to start a job - 10 hour days four days a week. Hopefully, some structure will help the patient. Restart Xanax. Continue duloxetine.    Orders:  -     ALPRAZolam (XANAX) 0.5 MG tablet; Take 1 tablet (0.5 mg total) by mouth daily as needed for Anxiety.  Dispense: 30 tablet; Refill: 1    5. Reactive depression  Assessment & Plan:  See plan for anxiety.          Follow up in 4 months.     Shukri Flores MD  01/28/2025

## 2025-01-29 ENCOUNTER — LAB VISIT (OUTPATIENT)
Dept: LAB | Facility: HOSPITAL | Age: 50
End: 2025-01-29
Attending: STUDENT IN AN ORGANIZED HEALTH CARE EDUCATION/TRAINING PROGRAM
Payer: OTHER GOVERNMENT

## 2025-01-29 ENCOUNTER — OCCUPATIONAL HEALTH (OUTPATIENT)
Dept: URGENT CARE | Facility: CLINIC | Age: 50
End: 2025-01-29

## 2025-01-29 DIAGNOSIS — Z00.00 HEALTHCARE MAINTENANCE: ICD-10-CM

## 2025-01-29 DIAGNOSIS — Z13.9 ENCOUNTER FOR SCREENING: Primary | ICD-10-CM

## 2025-01-29 LAB
ALBUMIN SERPL BCP-MCNC: 4 G/DL (ref 3.5–5.2)
ALP SERPL-CCNC: 55 U/L (ref 40–150)
ALT SERPL W/O P-5'-P-CCNC: 30 U/L (ref 10–44)
ANION GAP SERPL CALC-SCNC: 8 MMOL/L (ref 8–16)
AST SERPL-CCNC: 19 U/L (ref 10–40)
BILIRUB SERPL-MCNC: 0.9 MG/DL (ref 0.1–1)
BUN SERPL-MCNC: 20 MG/DL (ref 6–20)
CALCIUM SERPL-MCNC: 8.9 MG/DL (ref 8.7–10.5)
CHLORIDE SERPL-SCNC: 107 MMOL/L (ref 95–110)
CHOLEST SERPL-MCNC: 187 MG/DL (ref 120–199)
CHOLEST/HDLC SERPL: 3.3 {RATIO} (ref 2–5)
CO2 SERPL-SCNC: 25 MMOL/L (ref 23–29)
CREAT SERPL-MCNC: 0.9 MG/DL (ref 0.5–1.4)
EST. GFR  (NO RACE VARIABLE): >60 ML/MIN/1.73 M^2
ESTIMATED AVG GLUCOSE: 80 MG/DL (ref 68–131)
GLUCOSE SERPL-MCNC: 97 MG/DL (ref 70–110)
HBA1C MFR BLD: 4.4 % (ref 4–5.6)
HDLC SERPL-MCNC: 57 MG/DL (ref 40–75)
HDLC SERPL: 30.5 % (ref 20–50)
LDLC SERPL CALC-MCNC: 115.8 MG/DL (ref 63–159)
NONHDLC SERPL-MCNC: 130 MG/DL
POTASSIUM SERPL-SCNC: 4.5 MMOL/L (ref 3.5–5.1)
PROT SERPL-MCNC: 6.8 G/DL (ref 6–8.4)
SODIUM SERPL-SCNC: 140 MMOL/L (ref 136–145)
TRIGL SERPL-MCNC: 71 MG/DL (ref 30–150)

## 2025-01-29 PROCEDURE — 80061 LIPID PANEL: CPT | Performed by: STUDENT IN AN ORGANIZED HEALTH CARE EDUCATION/TRAINING PROGRAM

## 2025-01-29 PROCEDURE — 80053 COMPREHEN METABOLIC PANEL: CPT | Mod: PO | Performed by: STUDENT IN AN ORGANIZED HEALTH CARE EDUCATION/TRAINING PROGRAM

## 2025-01-29 PROCEDURE — 83036 HEMOGLOBIN GLYCOSYLATED A1C: CPT | Performed by: STUDENT IN AN ORGANIZED HEALTH CARE EDUCATION/TRAINING PROGRAM

## 2025-01-29 PROCEDURE — 80305 DRUG TEST PRSMV DIR OPT OBS: CPT | Mod: S$GLB,,, | Performed by: SURGERY

## 2025-05-19 NOTE — ASSESSMENT & PLAN NOTE
Stressing factors include lack of employment, recent health issues, and continuing war in [facility]. He doesn't feel the Cymbalta is working but he would like to keep it for analgesic effect. I will augment the Cymbalta with Wellbutrin.   Dr Robertson

## 2025-05-21 ENCOUNTER — PATIENT MESSAGE (OUTPATIENT)
Dept: PODIATRY | Facility: CLINIC | Age: 50
End: 2025-05-21
Payer: OTHER GOVERNMENT

## 2025-05-28 ENCOUNTER — OFFICE VISIT (OUTPATIENT)
Dept: FAMILY MEDICINE | Facility: CLINIC | Age: 50
End: 2025-05-28
Payer: OTHER GOVERNMENT

## 2025-05-28 VITALS
DIASTOLIC BLOOD PRESSURE: 76 MMHG | SYSTOLIC BLOOD PRESSURE: 134 MMHG | WEIGHT: 207 LBS | OXYGEN SATURATION: 97 % | BODY MASS INDEX: 28.87 KG/M2 | HEART RATE: 75 BPM

## 2025-05-28 DIAGNOSIS — Z12.11 COLON CANCER SCREENING: ICD-10-CM

## 2025-05-28 DIAGNOSIS — F41.1 ANXIETY AS ACUTE REACTION TO EXCEPTIONAL STRESS: Primary | ICD-10-CM

## 2025-05-28 DIAGNOSIS — F43.0 ANXIETY AS ACUTE REACTION TO EXCEPTIONAL STRESS: Primary | ICD-10-CM

## 2025-05-28 PROCEDURE — 99213 OFFICE O/P EST LOW 20 MIN: CPT | Mod: PBBFAC,PO | Performed by: STUDENT IN AN ORGANIZED HEALTH CARE EDUCATION/TRAINING PROGRAM

## 2025-05-28 PROCEDURE — 99213 OFFICE O/P EST LOW 20 MIN: CPT | Mod: S$PBB,,, | Performed by: STUDENT IN AN ORGANIZED HEALTH CARE EDUCATION/TRAINING PROGRAM

## 2025-05-28 PROCEDURE — 99999 PR PBB SHADOW E&M-EST. PATIENT-LVL III: CPT | Mod: PBBFAC,,, | Performed by: STUDENT IN AN ORGANIZED HEALTH CARE EDUCATION/TRAINING PROGRAM

## 2025-05-28 NOTE — ASSESSMENT & PLAN NOTE
Improved. Patient has a little more structure with his job. He has limited his intake of news media. Currently weaning his duloxetine and is taking one every other day. He hasn't taken alprazolam in months. Continue current regimen. Patient can continue to wean duloxetine as he sees fit.

## 2025-05-28 NOTE — PROGRESS NOTES
Name: Hi Salmeron  MRN: [id number]  : 1975  PCP: Shukri Flores MD    Subjective   Patient presents for follow up of anxiety.    He mentions that in the interval he did some testing with [facility] and is now taking some special supplements.    Review of Systems   Cardiovascular:  Negative for palpitations and leg swelling.   Neurological:  Negative for dizziness.       Problem List[1]    Health Maintenance Due   Topic Date Due    Shingles Vaccine (1 of 2) Never done    Pneumococcal Vaccines (Age 50+) (1 of 1 - PCV) Never done    Colorectal Cancer Screening  2024    COVID-19 Vaccine ( season) 2024       Objective   Vitals:    25 1319   BP: 134/76   Pulse: 75       Physical Exam  Constitutional:       General: He is not in acute distress.     Appearance: Normal appearance. He is well-developed.   HENT:      Head: Normocephalic and atraumatic.      Right Ear: External ear normal.      Left Ear: External ear normal.   Eyes:      Conjunctiva/sclera: Conjunctivae normal.   Pulmonary:      Effort: Pulmonary effort is normal. No respiratory distress.   Abdominal:      General: Abdomen is flat. There is no distension.   Musculoskeletal:         General: No swelling or deformity.      Right lower leg: No edema.      Left lower leg: No edema.   Skin:     General: Skin is warm and dry.      Coloration: Skin is not jaundiced.   Neurological:      Mental Status: He is alert and oriented to person, place, and time. Mental status is at baseline.   Psychiatric:         Attention and Perception: Attention and perception normal.         Mood and Affect: Mood normal.         Speech: Speech normal.         Behavior: Behavior normal. Behavior is cooperative.         Thought Content: Thought content normal.         Cognition and Memory: Cognition normal.         Judgment: Judgment normal.         Assessment & Plan    1. Anxiety as acute reaction to exceptional stress  Assessment & Plan:  Improved.  Patient has a little more structure with his job. He has limited his intake of news media. Currently weaning his duloxetine and is taking one every other day. He hasn't taken alprazolam in months. Continue current regimen. Patient can continue to wean duloxetine as he sees fit.       2. Colon cancer screening  -     Case Request Endoscopy: COLONOSCOPY         Follow up in 6 months.     Shukri Flores MD  05/28/2025         [1]   Patient Active Problem List  Diagnosis    Chronic neck pain    Neck pain    Anxiety as acute reaction to exceptional stress    Sprain of lateral collateral ligament of right knee    Strain of right hamstring    Open wound of plantar aspect of foot    Chronic pain of right knee    Chronic low back pain    Reactive depression    Joint pain in fingers of right hand

## 2025-06-19 ENCOUNTER — TELEPHONE (OUTPATIENT)
Dept: GASTROENTEROLOGY | Facility: CLINIC | Age: 50
End: 2025-06-19
Payer: OTHER GOVERNMENT

## 2025-06-19 NOTE — TELEPHONE ENCOUNTER
Attempted to reach pt to schedule scope from case request. Left VM, call back number provided. [employer] message sent.

## 2025-07-10 ENCOUNTER — OFFICE VISIT (OUTPATIENT)
Dept: PODIATRY | Facility: CLINIC | Age: 50
End: 2025-07-10
Payer: OTHER GOVERNMENT

## 2025-07-10 VITALS — BODY MASS INDEX: 28.98 KG/M2 | WEIGHT: 207 LBS | HEIGHT: 71 IN

## 2025-07-10 DIAGNOSIS — B07.0 PLANTAR WART: Primary | ICD-10-CM

## 2025-07-10 DIAGNOSIS — M79.671 FOOT PAIN, RIGHT: ICD-10-CM

## 2025-07-10 PROCEDURE — 99213 OFFICE O/P EST LOW 20 MIN: CPT | Mod: S$PBB,,, | Performed by: PODIATRIST

## 2025-07-10 PROCEDURE — 99213 OFFICE O/P EST LOW 20 MIN: CPT | Mod: PBBFAC,PO | Performed by: PODIATRIST

## 2025-07-10 PROCEDURE — 99999 PR PBB SHADOW E&M-EST. PATIENT-LVL III: CPT | Mod: PBBFAC,,, | Performed by: PODIATRIST

## 2025-07-11 NOTE — PROGRESS NOTES
Subjective:      Patient ID: Hi Salmeron is a 50 y.o. male.    Chief Complaint: Callouses (Right foot callous)  Patient presents to clinic roughly 10 months following excision of a Rt. Plantar wart.  States the lesion appears to have returned.  Notes recurrence developed roughly two months ago.  Since that time, he has been applying a topical wart removal treatment to the site.  States sensitivity has improved with its use.  Relates that a satellite lesion developed, but this appears to be mostly resolved.  Currently rates pain as a 1/10.   Notes mild sensitivity with weight bearing.  Symptoms are alleviated with rest.  Denies any additional pedal complaints.        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     Migraine headache 2022    Sleep apnea 2022       Past Surgical History:   Procedure Laterality Date    BICEPS TENDON REPAIR      FOOT MASS EXCISION Right 07/22/2021    Procedure: EXCISION, MASS, FOOT;  Surgeon: Mamadou Bernal DPM;  Location: [facility] OR;  Service: Podiatry;  Laterality: Right;    SEPTOPLASTY, NOSE, WITH NASAL TURBINATE REDUCTION Bilateral 3/13/2024    Procedure: SEPTOPLASTY, NOSE, WITH NASAL TURBINATE REDUCTION;  Surgeon: Geovanna Wisdom MD;  Location: [facility] OR;  Service: ENT;  Laterality: Bilateral;    UNDESCENDED TESTICLE EXPLORATION Left        Family History   Problem Relation Name Age of Onset    Breast cancer Mother Grecia Clemons 37    Hypertension Father Griffin Salmeron     Heart attack Father Griffin Salmeron     Throat cancer Maternal Uncle      Leukemia Other          Nephew    Blindness Neg Hx      Cataracts Neg Hx      Diabetes Neg Hx      Glaucoma Neg Hx      Macular degeneration Neg Hx      Retinal detachment Neg Hx      Strabismus Neg Hx      Stroke Neg Hx      Thyroid disease Neg Hx         Social History     Socioeconomic History    Marital status:    Occupational History     Employer: [employer]   Tobacco  Use    Smoking status: Former     Current packs/day: 0.00     Average packs/day: 0.3 packs/day for 10.0 years (2.5 ttl pk-yrs)     Types: Cigarettes     Start date: 1995     Quit date: 2005     Years since quittin.5    Smokeless tobacco: Never   Substance and Sexual Activity    Alcohol use: Not Currently     Comment: Socially    Drug use: No    Sexual activity: Yes     Partners: Female     Birth control/protection: None   Social History Narrative    Lives with wife and two children.      Social Drivers of Health     Financial Resource Strain: Low Risk  (2025)    Overall Financial Resource Strain (CARDIA)     Difficulty of Paying Living Expenses: Not hard at all   Food Insecurity: No Food Insecurity (2025)    Hunger Vital Sign     Worried About Running Out of Food in the Last Year: Never true     Ran Out of Food in the Last Year: Never true   Transportation Needs: No Transportation Needs (2025)    PRAPARE - Transportation     Lack of Transportation (Medical): No     Lack of Transportation (Non-Medical): No   Physical Activity: Insufficiently Active (2025)    Exercise Vital Sign     Days of Exercise per Week: 1 day     Minutes of Exercise per Session: 30 min   Stress: Stress Concern Present (2025)    [employer] [place] [employer] - Occupational Stress Questionnaire     Feeling of Stress : Rather much   Housing Stability: Low Risk  (2025)    Housing Stability Vital Sign     Unable to Pay for Housing in the Last Year: No     Number of Times Moved in the Last Year: 0     Homeless in the Last Year: No       Current Outpatient Medications   Medication Sig Dispense Refill    ALPRAZolam (XANAX) 0.5 MG tablet Take 1 tablet (0.5 mg total) by mouth daily as needed for Anxiety. 30 tablet 1    DULoxetine (CYMBALTA) 30 MG capsule TAKE 3 CAPSULES(90 MG) BY MOUTH TWICE DAILY 90 capsule 1    gabapentin (NEURONTIN) 600 MG tablet Take 1 tablet (600 mg total) by mouth 2 (two) times  daily. 180 tablet 1    meloxicam (MOBIC) 15 MG tablet Take 1 tablet (15 mg total) by mouth once daily. 30 tablet 11    methocarbamoL (ROBAXIN) 500 MG Tab Take 500-1,000 mg by mouth.      tiZANidine (ZANAFLEX) 4 MG tablet Take 1 tablet (4 mg total) by mouth every 8 (eight) hours as needed (neck pain). (Patient not taking: Reported on 1/28/2025) 90 tablet 11     No current facility-administered medications for this visit.       Review of patient's allergies indicates:   Allergen Reactions    Primaquine      Other Reaction(s): Unknown    G6PD DEF       Review of Systems   Constitutional: Negative for chills and fever.   Cardiovascular:  Negative for claudication and leg swelling.   Skin:  Positive for suspicious lesions. Negative for color change and nail changes.   Musculoskeletal:  Negative for joint pain, joint swelling, muscle cramps and muscle weakness.   Gastrointestinal:  Negative for nausea and vomiting.   Neurological:  Negative for numbness and paresthesias.   Psychiatric/Behavioral:  Negative for altered mental status.            Objective:      Physical Exam  Constitutional:       Appearance: Normal appearance. He is not ill-appearing.   Cardiovascular:      Pulses:           Dorsalis pedis pulses are 2+ on the right side and 2+ on the left side.        Posterior tibial pulses are 2+ on the right side and 2+ on the left side.      Comments: CFT is < 3 seconds bilateral.  Pedal hair growth is present bilateral.  No lower extremity edema noted bilateral.  Toes are warm to touch bilateral.    Musculoskeletal:         General: Tenderness present. No deformity or signs of injury.      Right lower leg: No edema.      Left lower leg: No edema.      Comments: Muscle strength 5/5 in all muscle groups bilateral.  No tenderness nor crepitation with ROM of foot/ankle joints bilateral.  Pain with palpation to a small verruca lesion of the Rt. 4th plantar intermetatarsal space.     Skin:     General: Skin is warm and  dry.      Capillary Refill: Capillary refill takes less than 2 seconds.      Findings: Lesion present. No bruising, ecchymosis, erythema, signs of injury, laceration, petechiae, rash or wound.      Comments: Small verrucous lesion noted to the Rt. 4th intermetatarsal space about the distal most edge of the prior incision.  No intralesional capillaries are apparent.  Measures 0.2 x 0.2cm.     Neurological:      Mental Status: He is alert.      Sensory: No sensory deficit.      Motor: No weakness or atrophy.      Comments: Light touch is intact bilateral.                 Assessment:       Encounter Diagnoses   Name Primary?    Plantar wart Yes    Foot pain, right              Plan:       Hi was seen today for callouses.    Diagnoses and all orders for this visit:    Plantar wart    Foot pain, right          I counseled the patient on his conditions, their implications and medical management.    Small, reoccurring verrucous lesion noted to the Rt. Plantar 4th intermetatarsal space.    Advised to continue applying the topical wart removal to the site and covering with an occlusive dressing QD x 1 month.    To continue wearing shoe gear that minimizes pressure to the affected area.    Advised to inspect the site for any signs of skin infection.    RTC in 1 month for a follow up.    Mamadou Bernal DPM

## 2025-07-16 ENCOUNTER — TELEPHONE (OUTPATIENT)
Dept: GASTROENTEROLOGY | Facility: CLINIC | Age: 50
End: 2025-07-16
Payer: OTHER GOVERNMENT

## 2025-07-16 NOTE — TELEPHONE ENCOUNTER
Several attempts have been made to contact pt to schedule ordered procedure. Case request canceled. Letter placed in mail.   Unable to leave .

## 2025-08-05 ENCOUNTER — TELEPHONE (OUTPATIENT)
Dept: PODIATRY | Facility: CLINIC | Age: 50
End: 2025-08-05
Payer: OTHER GOVERNMENT

## 2025-08-08 ENCOUNTER — PATIENT MESSAGE (OUTPATIENT)
Dept: PODIATRY | Facility: CLINIC | Age: 50
End: 2025-08-08
Payer: OTHER GOVERNMENT

## 2025-08-21 ENCOUNTER — OFFICE VISIT (OUTPATIENT)
Dept: FAMILY MEDICINE | Facility: CLINIC | Age: 50
End: 2025-08-21
Payer: OTHER GOVERNMENT

## 2025-08-21 VITALS
HEIGHT: 71 IN | BODY MASS INDEX: 29.29 KG/M2 | OXYGEN SATURATION: 96 % | SYSTOLIC BLOOD PRESSURE: 122 MMHG | DIASTOLIC BLOOD PRESSURE: 84 MMHG | WEIGHT: 209.19 LBS | HEART RATE: 103 BPM | TEMPERATURE: 99 F

## 2025-08-21 DIAGNOSIS — U07.1 COVID-19: Primary | ICD-10-CM

## 2025-08-21 DIAGNOSIS — B34.9 VIRAL SYNDROME: ICD-10-CM

## 2025-08-21 LAB
CTP QC/QA: YES
CTP QC/QA: YES
POC MOLECULAR INFLUENZA A AGN: NEGATIVE
POC MOLECULAR INFLUENZA B AGN: NEGATIVE
SARS-COV-2 RDRP RESP QL NAA+PROBE: POSITIVE

## 2025-08-21 PROCEDURE — 99999 PR PBB SHADOW E&M-EST. PATIENT-LVL III: CPT | Mod: PBBFAC,,, | Performed by: NURSE PRACTITIONER

## 2025-08-21 PROCEDURE — 99213 OFFICE O/P EST LOW 20 MIN: CPT | Mod: S$PBB,,, | Performed by: NURSE PRACTITIONER

## 2025-08-21 PROCEDURE — 99999PBSHW POCT INFLUENZA A/B MOLECULAR: Mod: PBBFAC,,,

## 2025-08-21 PROCEDURE — 99213 OFFICE O/P EST LOW 20 MIN: CPT | Mod: PBBFAC,PO | Performed by: NURSE PRACTITIONER

## 2025-08-21 PROCEDURE — 87635 SARS-COV-2 COVID-19 AMP PRB: CPT | Mod: PBBFAC,PO | Performed by: NURSE PRACTITIONER

## 2025-08-21 PROCEDURE — 99999PBSHW: Mod: PBBFAC,,,

## 2025-08-21 PROCEDURE — 87502 INFLUENZA DNA AMP PROBE: CPT | Mod: PBBFAC,PO | Performed by: NURSE PRACTITIONER

## 2025-08-29 DIAGNOSIS — M54.2 CHRONIC NECK PAIN: ICD-10-CM

## 2025-08-29 DIAGNOSIS — G89.29 CHRONIC NECK PAIN: ICD-10-CM

## 2025-08-29 RX ORDER — TIZANIDINE 4 MG/1
TABLET ORAL
Qty: 90 TABLET | Refills: 8 | Status: SHIPPED | OUTPATIENT
Start: 2025-08-29

## (undated) DEVICE — MARKER SKIN RULER STERILE

## (undated) DEVICE — HANDLE SURG LIGHT NONRIGID

## (undated) DEVICE — TOWEL OR DISP STRL BLUE 4/PK

## (undated) DEVICE — SEE MEDLINE ITEM 157128

## (undated) DEVICE — KIT ANTIFOG

## (undated) DEVICE — SUCTION COAGULATOR 10FR 6IN

## (undated) DEVICE — SUT PROLENE 3-0 PS-1 BL 18

## (undated) DEVICE — SHOE MED-SURG LARGE BLACK 10.2

## (undated) DEVICE — BLADE SURG #15 CARBON STEEL

## (undated) DEVICE — SUT MONOCRYL 3-0 PS-2 UND

## (undated) DEVICE — Device

## (undated) DEVICE — SUT 4-0 CHROMIC GUT / RB1

## (undated) DEVICE — STOCKINET 4INX48

## (undated) DEVICE — APPLICATOR CHLORAPREP ORN 26ML

## (undated) DEVICE — SUT 4/0 18IN GUT PLAINPS-2

## (undated) DEVICE — DRESSING XEROFORM FOIL PK 1X8

## (undated) DEVICE — NEPTUNE 4 PORT MANIFOLD

## (undated) DEVICE — DRAPE EXTREMITY W/ABC NON-SLIP

## (undated) DEVICE — BANDAGE ESMARK LATEX FREE 4INX

## (undated) DEVICE — SHEET EENT SPLIT

## (undated) DEVICE — DRAPE STERI-DRAPE 1000 17X11IN

## (undated) DEVICE — GLOVE SENSICARE PI ALOE 6

## (undated) DEVICE — SEE MEDLINE ITEM 146308

## (undated) DEVICE — SUT MONOCYRL 4-0 PS2 UND

## (undated) DEVICE — SEE MEDLINE ITEM 152529

## (undated) DEVICE — SPONGE PATTY SURGICAL .5X3IN

## (undated) DEVICE — SEE L#120831

## (undated) DEVICE — KIT SAHARA DRAPE DRAW/LIFT

## (undated) DEVICE — SYR 3CC LUER LOC

## (undated) DEVICE — SPLINT NASAL AIRWAY SEPTAL SIL

## (undated) DEVICE — ELECTRODE REM PLYHSV RETURN 9

## (undated) DEVICE — SUT ETHILON 3-0 FS-1 30

## (undated) DEVICE — BLADE 2.9MM INFERIOR TURBINATE

## (undated) DEVICE — SEE MEDLINE ITEM 157131

## (undated) DEVICE — LINER GLOVE POWDERFREE 8

## (undated) DEVICE — TUBING SUC UNIV W/CONN 12FT

## (undated) DEVICE — DRESSING EYE OVAL LF

## (undated) DEVICE — PADDING CAST 4IN SPECIALIST

## (undated) DEVICE — GLOVE SURGICAL LATEX SZ 8

## (undated) DEVICE — NDL HYPO REG 25G X 1 1/2

## (undated) DEVICE — SYR 10CC LUER LOCK

## (undated) DEVICE — NDL HYPO 27G X 1 1/2

## (undated) DEVICE — GAUZE SPONGE 4X4 12PLY

## (undated) DEVICE — STRAP OR TABLE 5IN X 72IN

## (undated) DEVICE — WIPE MICRO TIP

## (undated) DEVICE — DRAPE EENT SPLIT STERILE